# Patient Record
Sex: FEMALE | Race: ASIAN | NOT HISPANIC OR LATINO | Employment: OTHER | URBAN - METROPOLITAN AREA
[De-identification: names, ages, dates, MRNs, and addresses within clinical notes are randomized per-mention and may not be internally consistent; named-entity substitution may affect disease eponyms.]

---

## 2017-01-19 ENCOUNTER — OFFICE VISIT (OUTPATIENT)
Dept: FAMILY MEDICINE | Facility: CLINIC | Age: 62
End: 2017-01-19
Payer: COMMERCIAL

## 2017-01-19 VITALS
WEIGHT: 148 LBS | HEART RATE: 69 BPM | DIASTOLIC BLOOD PRESSURE: 94 MMHG | TEMPERATURE: 97.9 F | SYSTOLIC BLOOD PRESSURE: 152 MMHG | BODY MASS INDEX: 26.22 KG/M2 | HEIGHT: 63 IN

## 2017-01-19 DIAGNOSIS — M51.16 LUMBAR DISC DISEASE WITH RADICULOPATHY: ICD-10-CM

## 2017-01-19 DIAGNOSIS — M47.26 OSTEOARTHRITIS OF SPINE WITH RADICULOPATHY, LUMBAR REGION: ICD-10-CM

## 2017-01-19 DIAGNOSIS — M47.22 OSTEOARTHRITIS OF SPINE WITH RADICULOPATHY, CERVICAL REGION: Primary | ICD-10-CM

## 2017-01-19 PROCEDURE — 99213 OFFICE O/P EST LOW 20 MIN: CPT | Performed by: FAMILY MEDICINE

## 2017-01-19 RX ORDER — LOSARTAN POTASSIUM 100 MG/1
100 TABLET ORAL DAILY
COMMUNITY
Start: 2016-12-21 | End: 2017-05-02

## 2017-01-19 NOTE — Clinical Note
[address] [address]  Phone: [phone]    January 19, 2017        Idalia Howe  [address] [place] [address]          To whom it may concern:    RE: Idalia Friedman has been working with current restrictive status since last time. She has worked with repetitive movement including bending back and neck/squatting legs/stretching shoulders and arms/frequent rotation of back and neck related with current position at work. 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 So, I recommend her that she should maintain the level of activity in carrying and level lifting as noted below. Then, we will reassess the functional status in 6 months or earlier as needed.     - May work up to 8 hours per day.  - Sitting - standard chair but stretching and positional changes as needed   - Standing and walking - stretching or resting 10 minutes every 30 minutes   - Carrying and level lifting - less than 10 lbs, occasionally(less than 50% of the day's shift)   - Bending and lifting - may lift up to 15 lbs on occasional basis, up to 5 times per hour   - Pushing and pulling - less than 15 lbs at height between waist and chest and without bending forward         Sincerely,          Akhil Almodovar MD

## 2017-01-19 NOTE — PROGRESS NOTES
SUBJECTIVE:                                                    Idalia Howe is a 61 year old female who presents to clinic today for the following health issues:      Back Pain      Duration: ongoing        Specific cause:     Description:   Location of pain: upper back bilateral and neck both  Character of pain: sharp and dull ache  Pain radiation:radiates into the right leg and radiates into the right foot  New numbness or weakness in legs, not attributed to pain:  no     Intensity: moderate    History:   Pain interferes with job: No  History of back problems: recurrent self limited episodes of low back pain in the past  Any previous MRI or X-rays:   Sees a specialist for back pain:  No  Therapies tried without relief:     Alleviating factors:   Improved by:       Precipitating factors:  Worsened by: upper body movement and and putting neck down    Functional and Psychosocial Screen (Ac STarT Back):      Not performed today       Accompanying Signs & Symptoms:  Risk of Fracture:  None  Risk of Cauda Equina:  None  Risk of Infection:  None  Risk of Cancer:  None  Risk of Ankylosing Spondylitis:  Onset at age <35, male, AND morning back stiffness. no                        Problem list and histories reviewed & adjusted, as indicated.  Additional history: as documented    Patient Active Problem List   Diagnosis     Hyperlipidemia LDL goal <160     Lumbar spondylosis     Lumbar disc disease with radiculopathy     DJD (degenerative joint disease) of cervical spine     Malignant neoplasm of thyroid gland (H)     Nonspecific reaction to tuberculin test     Cyst of ovary     History of malignant neoplasm of thyroid     Postoperative hypothyroidism     Acute bronchitis     Chronic bronchitis (H)     No past surgical history on file.    Social History   Substance Use Topics     Smoking status: Never Smoker      Smokeless tobacco: Never Used     Alcohol Use: No     No family history on file.      Current Outpatient  "Prescriptions   Medication Sig Dispense Refill     losartan (COZAAR) 100 MG tablet Take 100 mg by mouth daily       simvastatin (ZOCOR) 20 MG tablet Take 20 mg by mouth       LEVOTHYROXINE SODIUM PO Take 112 mcg by mouth daily       No Known Allergies  Recent Labs   Lab Test 12/15/14 11/15/13 05/24/12   LDL  109  179*  130   HDL  57  56   --    TRIG  152  149   --    ALT  37  22  24   CR  0.64  0.70  0.60   GFRESTIMATED  >60  >60  >60   GFRESTBLACK  >60  >60  >60   POTASSIUM  3.9  3.9  4.2   TSH  <0.015  8.115   --       BP Readings from Last 3 Encounters:   01/19/17 152/94   06/09/16 142/87   05/25/16 108/66    Wt Readings from Last 3 Encounters:   01/19/17 148 lb (67.132 kg)   06/09/16 147 lb 6 oz (66.849 kg)   05/25/16 147 lb (66.679 kg)                    ROS:  Constitutional, HEENT, cardiovascular, pulmonary, gi and gu systems are negative, except as otherwise noted.    OBJECTIVE:                                                    /94 mmHg  Pulse 69  Temp(Src) 97.9  F (36.6  C) (Tympanic)  Ht 5' 3\" (1.6 m)  Wt 148 lb (67.132 kg)  BMI 26.22 kg/m2  Body mass index is 26.22 kg/(m^2).  GENERAL: healthy, alert and no distress  NECK: no adenopathy, no asymmetry, masses, or scars and thyroid normal to palpation  RESP: lungs clear to auscultation - no rales, rhonchi or wheezes  CV: regular rate and rhythm, normal S1 S2, no S3 or S4, no murmur, click or rub, no peripheral edema and peripheral pulses strong  ABDOMEN: soft, nontender, no hepatosplenomegaly, no masses and bowel sounds normal  MS: no gross musculoskeletal defects noted, no edema         ASSESSMENT/PLAN:                                                    Idalia was seen today for back pain.    Diagnoses and all orders for this visit:    Osteoarthritis of spine with radiculopathy, cervical region    Osteoarthritis of spine with radiculopathy, lumbar region    Lumbar disc disease with radiculopathy        She has been working at [employer] with " same restriction as noted below. It has been stable and willing to try extra 1 more hours per day. However, she is still has myalgia/upper back and low back spasm with radiculopathy with repetitive movement at work as was last time.  Will allow her to try work restriction as is noted below for now and  reassess if needed as needed base.      - May work up to 7 hours per day.  - Sitting - standard chair but stretching and positional changes as needed   - Standing and walking - stretching or resting 10 minutes every 30 minutes   - Carrying and level lifting - less than 10 lbs, occasionally(less than 50% of the day's shift)   - Bending and lifting - may lift up to 15 lbs on occasional basis, up to 5 times per hour   - Pushing and pulling - less than 15 lbs at height between waist and chest and without bending forward       FUTURE APPOINTMENTS:       - Follow-up visit in 6 months     Akhil Almodovar MD  [facility]

## 2017-01-19 NOTE — NURSING NOTE
"Chief Complaint   Patient presents with     Back Pain     work comp       Initial /94 mmHg  Pulse 69  Temp(Src) 97.9  F (36.6  C) (Tympanic)  Ht 5' 3\" (1.6 m)  Wt 148 lb (67.132 kg)  BMI 26.22 kg/m2 Estimated body mass index is 26.22 kg/(m^2) as calculated from the following:    Height as of this encounter: 5' 3\" (1.6 m).    Weight as of this encounter: 148 lb (67.132 kg).  BP completed using cuff size: alanis Adams CMA    "

## 2017-01-19 NOTE — MR AVS SNAPSHOT
"              After Visit Summary   1/19/2017    Idalia Howe    MRN: [id number]           Patient Information     Date Of Birth          1955        Visit Information        Provider Department      1/19/2017 10:40 AM Akhil Almodovar MD [facility]        Today's Diagnoses     Osteoarthritis of spine with radiculopathy, cervical region    -  1     Osteoarthritis of spine with radiculopathy, lumbar region         Lumbar disc disease with radiculopathy            Follow-ups after your visit        Your next 10 appointments already scheduled     Jan 23, 2017  9:40 AM   PHYSICAL with Akhil Almodovar MD   [facility] ([facility]    [address] [place] [address] [phone]              Who to contact     If you have questions or need follow up information about today's clinic visit or your schedule please contact [facility] directly at [phone].  Normal or non-critical lab and imaging results will be communicated to you by Weottahart, letter or phone within 4 business days after the clinic has received the results. If you do not hear from us within 7 days, please contact the clinic through Weottahart or phone. If you have a critical or abnormal lab result, we will notify you by phone as soon as possible.  Submit refill requests through Parabel or call your pharmacy and they will forward the refill request to us. Please allow 3 business days for your refill to be completed.          Additional Information About Your Visit        Weottahart Information     Parabel lets you send messages to your doctor, view your test results, renew your prescriptions, schedule appointments and more. To sign up, go to [email or web address] . Click on \"Log in\" on the left side of the screen, which will take you to the Welcome page. Then click on \"Sign up Now\" on the right side of the page.     You will be asked to enter the access code listed " "below, as well as some personal information. Please follow the directions to create your username and password.     Your access code is: [email or web address]  Expires: 2017 12:26 PM     Your access code will  in 90 days. If you need help or a new code, please call your [place] clinic or [phone].        Care EveryWhere ID     This is your Care EveryWhere ID. This could be used by other organizations to access your [place] medical records  [id number]        Your Vitals Were     Pulse Temperature Height BMI (Body Mass Index)          69 97.9  F (36.6  C) (Tympanic) 5' 3\" (1.6 m) 26.22 kg/m2         Blood Pressure from Last 3 Encounters:   17 152/94   16 142/87   16 108/66    Weight from Last 3 Encounters:   17 148 lb (67.132 kg)   16 147 lb 6 oz (66.849 kg)   16 147 lb (66.679 kg)              Today, you had the following     No orders found for display         Today's Medication Changes          These changes are accurate as of: 17 12:26 PM.  If you have any questions, ask your nurse or doctor.               Stop taking these medicines if you haven't already. Please contact your care team if you have questions.     fluticasone 50 MCG/ACT spray   Commonly known as:  FLONASE   Stopped by:  Akhil Almodovar MD                    Primary Care Provider Office Phone # Fax #    Akhil Almodovar [phone]       [address] [address]        Thank you!     Thank you for choosing [facility]  for your care. Our goal is always to provide you with excellent care. Hearing back from our patients is one way we can continue to improve our services. Please take a few minutes to complete the written survey that you may receive in the mail after your visit with us. Thank you!             Your Updated Medication List - Protect others around you: Learn how to safely use, store and throw away your medicines at " www.disposemymeds.org.          This list is accurate as of: 1/19/17 12:26 PM.  Always use your most recent med list.                   Brand Name Dispense Instructions for use    LEVOTHYROXINE SODIUM PO      Take 112 mcg by mouth daily       losartan 100 MG tablet    COZAAR     Take 100 mg by mouth daily       simvastatin 20 MG tablet    ZOCOR     Take 20 mg by mouth

## 2017-01-23 ENCOUNTER — OFFICE VISIT (OUTPATIENT)
Dept: FAMILY MEDICINE | Facility: CLINIC | Age: 62
End: 2017-01-23
Payer: COMMERCIAL

## 2017-01-23 VITALS
BODY MASS INDEX: 26.4 KG/M2 | DIASTOLIC BLOOD PRESSURE: 97 MMHG | WEIGHT: 149 LBS | TEMPERATURE: 97.1 F | SYSTOLIC BLOOD PRESSURE: 141 MMHG | HEIGHT: 63 IN | OXYGEN SATURATION: 97 % | HEART RATE: 64 BPM

## 2017-01-23 DIAGNOSIS — Z23 NEED FOR PROPHYLACTIC VACCINATION AND INOCULATION AGAINST INFLUENZA: ICD-10-CM

## 2017-01-23 DIAGNOSIS — N39.0 FREQUENT UTI: ICD-10-CM

## 2017-01-23 DIAGNOSIS — I10 BENIGN ESSENTIAL HYPERTENSION: ICD-10-CM

## 2017-01-23 DIAGNOSIS — Z00.00 ROUTINE GENERAL MEDICAL EXAMINATION AT A HEALTH CARE FACILITY: Primary | ICD-10-CM

## 2017-01-23 DIAGNOSIS — E89.0 POSTOPERATIVE HYPOTHYROIDISM: ICD-10-CM

## 2017-01-23 DIAGNOSIS — N39.498 OTHER URINARY INCONTINENCE: ICD-10-CM

## 2017-01-23 DIAGNOSIS — Z12.39 BREAST CANCER SCREENING: ICD-10-CM

## 2017-01-23 DIAGNOSIS — C73 MALIGNANT NEOPLASM OF THYROID GLAND (H): ICD-10-CM

## 2017-01-23 LAB
ALT SERPL W P-5'-P-CCNC: 22 U/L (ref 0–50)
ANION GAP SERPL CALCULATED.3IONS-SCNC: 7 MMOL/L (ref 3–14)
BUN SERPL-MCNC: 17 MG/DL (ref 7–30)
CALCIUM SERPL-MCNC: 8.9 MG/DL (ref 8.5–10.1)
CHLORIDE SERPL-SCNC: 108 MMOL/L (ref 94–109)
CHOLEST SERPL-MCNC: 190 MG/DL
CO2 SERPL-SCNC: 26 MMOL/L (ref 20–32)
CREAT SERPL-MCNC: 0.75 MG/DL (ref 0.52–1.04)
ERYTHROCYTE [DISTWIDTH] IN BLOOD BY AUTOMATED COUNT: 13 % (ref 10–15)
GFR SERPL CREATININE-BSD FRML MDRD: 78 ML/MIN/1.7M2
GLUCOSE SERPL-MCNC: 113 MG/DL (ref 70–99)
HCT VFR BLD AUTO: 43.9 % (ref 35–47)
HDLC SERPL-MCNC: 66 MG/DL
HGB BLD-MCNC: 14.7 G/DL (ref 11.7–15.7)
LDLC SERPL CALC-MCNC: 95 MG/DL
MCH RBC QN AUTO: 29.3 PG (ref 26.5–33)
MCHC RBC AUTO-ENTMCNC: 33.5 G/DL (ref 31.5–36.5)
MCV RBC AUTO: 88 FL (ref 78–100)
NONHDLC SERPL-MCNC: 124 MG/DL
PLATELET # BLD AUTO: 372 10E9/L (ref 150–450)
POTASSIUM SERPL-SCNC: 3.7 MMOL/L (ref 3.4–5.3)
RBC # BLD AUTO: 5.02 10E12/L (ref 3.8–5.2)
SODIUM SERPL-SCNC: 141 MMOL/L (ref 133–144)
T4 FREE SERPL-MCNC: 1.34 NG/DL (ref 0.76–1.46)
TRIGL SERPL-MCNC: 143 MG/DL
TSH SERPL DL<=0.005 MIU/L-ACNC: 0.02 MU/L (ref 0.4–4)
WBC # BLD AUTO: 7 10E9/L (ref 4–11)

## 2017-01-23 PROCEDURE — 36415 COLL VENOUS BLD VENIPUNCTURE: CPT | Performed by: FAMILY MEDICINE

## 2017-01-23 PROCEDURE — 84439 ASSAY OF FREE THYROXINE: CPT | Performed by: FAMILY MEDICINE

## 2017-01-23 PROCEDURE — 99396 PREV VISIT EST AGE 40-64: CPT | Mod: 25 | Performed by: FAMILY MEDICINE

## 2017-01-23 PROCEDURE — 84443 ASSAY THYROID STIM HORMONE: CPT | Performed by: FAMILY MEDICINE

## 2017-01-23 PROCEDURE — 80061 LIPID PANEL: CPT | Performed by: FAMILY MEDICINE

## 2017-01-23 PROCEDURE — 80048 BASIC METABOLIC PNL TOTAL CA: CPT | Performed by: FAMILY MEDICINE

## 2017-01-23 PROCEDURE — 90471 IMMUNIZATION ADMIN: CPT | Performed by: FAMILY MEDICINE

## 2017-01-23 PROCEDURE — 90686 IIV4 VACC NO PRSV 0.5 ML IM: CPT | Performed by: FAMILY MEDICINE

## 2017-01-23 PROCEDURE — 84460 ALANINE AMINO (ALT) (SGPT): CPT | Performed by: FAMILY MEDICINE

## 2017-01-23 PROCEDURE — 85027 COMPLETE CBC AUTOMATED: CPT | Performed by: FAMILY MEDICINE

## 2017-01-23 RX ORDER — CHLORTHALIDONE 25 MG/1
12.5 TABLET ORAL DAILY
Qty: 45 TABLET | Refills: 1 | Status: SHIPPED | OUTPATIENT
Start: 2017-01-23 | End: 2017-07-27

## 2017-01-23 NOTE — NURSING NOTE
"Chief Complaint   Patient presents with     Physical       Initial /97 mmHg  Pulse 64  Temp(Src) 97.1  F (36.2  C) (Tympanic)  Ht 5' 3\" (1.6 m)  Wt 149 lb (67.586 kg)  BMI 26.40 kg/m2  SpO2 97% Estimated body mass index is 26.4 kg/(m^2) as calculated from the following:    Height as of this encounter: 5' 3\" (1.6 m).    Weight as of this encounter: 149 lb (67.586 kg).  BP completed using cuff size: regular  "

## 2017-01-23 NOTE — Clinical Note
[place] [address] [place] [address]   [phone]      January 24, 2017    Idalia Howe  [address] [place] [address]                Dear Ms Howe,     Your lab results including complete blood cell counts/electrolyte balance and thyroid function/liver and kidney function were all normal.  However, your glucose(blood sugar) is at borderline, please keep working on life style modification including low fat/carb diet with regular exercise.  We might need to recheck your glucose in 6 months with A1C to make sure if there is any possible sign of diabetes.Please plan to visit us at the time.    Thank you,  Sincerely,   Akhil Almodovar M.D.

## 2017-01-23 NOTE — MR AVS SNAPSHOT
After Visit Summary   1/23/2017    Idalia Howe    MRN: [id number]           Patient Information     Date Of Birth          1955        Visit Information        Provider Department      1/23/2017 9:40 AM Akhil Almodovar MD [facility]        Today's Diagnoses     Routine general medical examination at a health care facility    -  1     Malignant neoplasm of thyroid gland (H)         Postoperative hypothyroidism         Breast cancer screening         Benign essential hypertension         Frequent UTI         Other urinary incontinence           Care Instructions      Preventive Health Recommendations  Female Ages 50 - 64    Yearly exam: See your health care provider every year in order to  o Review health changes.   o Discuss preventive care.    o Review your medicines if your doctor has prescribed any.      Get a Pap test every three years (unless you have an abnormal result and your provider advises testing more often).    If you get Pap tests with HPV test, you only need to test every 5 years, unless you have an abnormal result.     You do not need a Pap test if your uterus was removed (hysterectomy) and you have not had cancer.    You should be tested each year for STDs (sexually transmitted diseases) if you're at risk.     Have a mammogram every 1 to 2 years.    Have a colonoscopy at age 50, or have a yearly FIT test (stool test). These exams screen for colon cancer.      Have a cholesterol test every 5 years, or more often if advised.    Have a diabetes test (fasting glucose) every three years. If you are at risk for diabetes, you should have this test more often.     If you are at risk for osteoporosis (brittle bone disease), think about having a bone density scan (DEXA).    Shots: Get a flu shot each year. Get a tetanus shot every 10 years.    Nutrition:     Eat at least 5 servings of fruits and vegetables each day.    Eat whole-grain bread, whole-wheat pasta and brown rice  instead of white grains and rice.    Talk to your provider about Calcium and Vitamin D.     Lifestyle    Exercise at least 150 minutes a week (30 minutes a day, 5 days a week). This will help you control your weight and prevent disease.    Limit alcohol to one drink per day.    No smoking.     Wear sunscreen to prevent skin cancer.     See your dentist every six months for an exam and cleaning.    See your eye doctor every 1 to 2 years.            Follow-ups after your visit        Additional Services     UROLOGY ADULT REFERRAL       Your provider has referred you to: SARAH: [facility] [phone]   [email or web address]    Please be aware that coverage of these services is subject to the terms and limitations of your health insurance plan.  Call member services at your health plan with any benefit or coverage questions.      Please bring the following with you to your appointment:    (1) Any X-Rays, CTs or MRIs which have been performed.  Contact the facility where they were done to arrange for  prior to your scheduled appointment.    (2) List of current medications  (3) This referral request   (4) Any documents/labs given to you for this referral                  Future tests that were ordered for you today     Open Future Orders        Priority Expected Expires Ordered    *MA Screening Digital Bilateral Routine  1/23/2018 1/23/2017            Who to contact     If you have questions or need follow up information about today's clinic visit or your schedule please contact [facility] directly at [phone].  Normal or non-critical lab and imaging results will be communicated to you by MyChart, letter or phone within 4 business days after the clinic has received the results. If you do not hear from us within 7 days, please contact the clinic through MyChart or phone. If you have a critical or abnormal lab result, we will notify you by phone as soon as  "possible.  Submit refill requests through LibertadCard or call your pharmacy and they will forward the refill request to us. Please allow 3 business days for your refill to be completed.          Additional Information About Your Visit        LibertadCard Information     LibertadCard lets you send messages to your doctor, view your test results, renew your prescriptions, schedule appointments and more. To sign up, go to [email or web address] . Click on \"Log in\" on the left side of the screen, which will take you to the Welcome page. Then click on \"Sign up Now\" on the right side of the page.     You will be asked to enter the access code listed below, as well as some personal information. Please follow the directions to create your username and password.     Your access code is: [email or web address]  Expires: 2017 12:26 PM     Your access code will  in 90 days. If you need help or a new code, please call your [place] clinic or [phone].        Care EveryWhere ID     This is your Care EveryWhere ID. This could be used by other organizations to access your [place] medical records  [id number]        Your Vitals Were     Pulse Temperature Height BMI (Body Mass Index) Pulse Oximetry       64 97.1  F (36.2  C) (Tympanic) 5' 3\" (1.6 m) 26.40 kg/m2 97%        Blood Pressure from Last 3 Encounters:   17 141/97   17 152/94   16 142/87    Weight from Last 3 Encounters:   17 149 lb (67.586 kg)   17 148 lb (67.132 kg)   16 147 lb 6 oz (66.849 kg)              We Performed the Following     ALT     Basic metabolic panel  (Ca, Cl, CO2, Creat, Gluc, K, Na, BUN)     CBC with platelets     Lipid Profile with reflex to direct LDL     TSH with free T4 reflex     UROLOGY ADULT REFERRAL          Today's Medication Changes          These changes are accurate as of: 17 10:12 AM.  If you have any questions, ask your nurse or doctor.               Start taking these medicines.        Dose/Directions    " chlorthalidone 25 MG tablet   Commonly known as:  HYGROTON   Used for:  Benign essential hypertension   Started by:  Akhil Almodovar MD        Dose:  12.5 mg   Take 0.5 tablets (12.5 mg) by mouth daily   Quantity:  45 tablet   Refills:  1            Where to get your medicines      These medications were sent to [id number] - [place] - [address]  & [address]  [address], [facility] [address]     Phone:  [phone]    - chlorthalidone 25 MG tablet             Primary Care Provider Office Phone # Fax #    Akhil Almodovar [phone]       [address] [address]        Thank you!     Thank you for choosing [facility]  for your [place]. Our goal is always to provide you with excellent care. Hearing back from our patients is one way we can continue to improve our services. Please take a few minutes to complete the written survey that you may receive in the mail after your visit with us. Thank you!             Your Updated Medication List - Protect others around you: Learn how to safely use, store and throw away your medicines at www.disposemymeds.org.          This list is accurate as of: 1/23/17 10:12 AM.  Always use your most recent med list.                   Brand Name Dispense Instructions for use    chlorthalidone 25 MG tablet    HYGROTON    45 tablet    Take 0.5 tablets (12.5 mg) by mouth daily       LEVOTHYROXINE SODIUM PO      Take 112 mcg by mouth daily       losartan 100 MG tablet    COZAAR     Take 100 mg by mouth daily       simvastatin 20 MG tablet    ZOCOR     Take 20 mg by mouth

## 2017-01-23 NOTE — PATIENT INSTRUCTIONS

## 2017-01-23 NOTE — PROGRESS NOTES
SUBJECTIVE:     CC: Idalia Howe is an 61 year old woman who presents for preventive health visit.     Healthy Habits:    Do you get at least three servings of calcium containing foods daily (dairy, green leafy vegetables, etc.)? yes    Amount of exercise or daily activities, outside of work: 5 day(s) per week    Problems taking medications regularly No    Medication side effects: No    Have you had an eye exam in the past two years? No    Do you see a dentist twice per year? yes    Do you have sleep apnea, excessive snoring or daytime drowsiness?no but is experiencing some sleep problems.       Musculoskeletal problem/pain      Duration: Chronic     Description  Location: Right sided arm/legs     Intensity:  mild, moderate    Accompanying signs and symptoms: numbness and tingling    History  Previous similar problem: YES  Previous evaluation:  none    Precipitating or alleviating factors:  Trauma or overuse: no   Aggravating factors include: none    Therapies tried and outcome: nothing       Today's PHQ-2 Score:   PHQ-2 ( 1999 [employer]) 1/23/2017 5/25/2016   Q1: Little interest or pleasure in doing things 0 0   Q2: Feeling down, depressed or hopeless 0 0   PHQ-2 Score 0 0       Abuse: Current or Past(Physical, Sexual or Emotional)- No  Do you feel safe in your environment - Yes    Social History   Substance Use Topics     Smoking status: Never Smoker      Smokeless tobacco: Never Used     Alcohol Use: No     The patient does not drink >3 drinks per day nor >7 drinks per week.    Recent Labs   Lab Test 12/15/14 11/15/13   CHOL  196  265*   HDL  57  56   LDL  109  179*   TRIG  152  149   NHDL  139  209       Reviewed orders with patient.  Reviewed health maintenance and updated orders accordingly - Yes    Mammo Decision Support:  Patient over age 50, mutual decision to screen reflected in health maintenance.    Pertinent mammograms are reviewed under the imaging tab.  History of abnormal Pap smear: NO - age 30- 65  PAP every 3 years recommended  All Histories reviewed and updated in Epic.  Past Medical History   Diagnosis Date     Back pain      Neuropathic pain      Hip pain       No past surgical history on file.    ROS:  C: NEGATIVE for fever, chills, change in weight  I: NEGATIVE for worrisome rashes, moles or lesions  E: NEGATIVE for vision changes or irritation  ENT: NEGATIVE for ear, mouth and throat problems  R: NEGATIVE for significant cough or SOB  B: NEGATIVE for masses, tenderness or discharge  CV: NEGATIVE for chest pain, palpitations or peripheral edema  GI: NEGATIVE for nausea, abdominal pain, heartburn, or change in bowel habits  : NEGATIVE for unusual urinary or vaginal symptoms. No vaginal bleeding.  M: NEGATIVE for significant arthralgias or myalgia  N: NEGATIVE for weakness, dizziness or paresthesias  P: NEGATIVE for changes in mood or affect     BP Readings from Last 3 Encounters:   01/23/17 141/97   01/19/17 152/94   06/09/16 142/87    Wt Readings from Last 3 Encounters:   01/23/17 149 lb (67.586 kg)   01/19/17 148 lb (67.132 kg)   06/09/16 147 lb 6 oz (66.849 kg)                  Patient Active Problem List   Diagnosis     Hyperlipidemia LDL goal <160     Lumbar spondylosis     Lumbar disc disease with radiculopathy     DJD (degenerative joint disease) of cervical spine     Malignant neoplasm of thyroid gland (H)     Nonspecific reaction to tuberculin test     Cyst of ovary     History of malignant neoplasm of thyroid     Postoperative hypothyroidism     Acute bronchitis     Chronic bronchitis (H)     Chronic rhinitis     No past surgical history on file.    Social History   Substance Use Topics     Smoking status: Never Smoker      Smokeless tobacco: Never Used     Alcohol Use: No     No family history on file.      Current Outpatient Prescriptions   Medication Sig Dispense Refill     chlorthalidone (HYGROTON) 25 MG tablet Take 0.5 tablets (12.5 mg) by mouth daily 45 tablet 1     losartan (COZAAR) 100  "MG tablet Take 100 mg by mouth daily       simvastatin (ZOCOR) 20 MG tablet Take 20 mg by mouth       LEVOTHYROXINE SODIUM PO Take 112 mcg by mouth daily       No Known Allergies  Recent Labs   Lab Test 12/15/14 11/15/13 05/24/12   LDL  109  179*  130   HDL  57  56   --    TRIG  152  149   --    ALT  37  22  24   CR  0.64  0.70  0.60   GFRESTIMATED  >60  >60  >60   GFRESTBLACK  >60  >60  >60   POTASSIUM  3.9  3.9  4.2   TSH  <0.015  8.115   --       OBJECTIVE:     /97 mmHg  Pulse 64  Temp(Src) 97.1  F (36.2  C) (Tympanic)  Ht 5' 3\" (1.6 m)  Wt 149 lb (67.586 kg)  BMI 26.40 kg/m2  SpO2 97%  EXAM:  GENERAL: healthy, alert and no distress  EYES: Eyes grossly normal to inspection, PERRL and conjunctivae and sclerae normal  HENT: ear canals and TM's normal, nose and mouth without ulcers or lesions  NECK: no adenopathy, no asymmetry, masses, or scars and thyroid normal to palpation  RESP: lungs clear to auscultation - no rales, rhonchi or wheezes  CV: regular rate and rhythm, normal S1 S2, no S3 or S4, no murmur, click or rub, no peripheral edema and peripheral pulses strong  ABDOMEN: soft, nontender, no hepatosplenomegaly, no masses and bowel sounds normal  MS: no gross musculoskeletal defects noted, no edema  SKIN: no suspicious lesions or rashes  NEURO: Normal strength and tone, mentation intact and speech normal  BACK: no CVA tenderness, no paralumbar tenderness  PSYCH: mentation appears normal, affect normal/bright  LYMPH: no cervical, supraclavicular, axillary, or inguinal adenopathy    ASSESSMENT/PLAN:     1. Routine general medical examination at a health care facility    - CBC with platelets  - Basic metabolic panel  (Ca, Cl, CO2, Creat, Gluc, K, Na, BUN)  - ALT  - Lipid Profile with reflex to direct LDL  - TSH with free T4 reflex  - *MA Screening Digital Bilateral; Future    2. Malignant neoplasm of thyroid gland (H)    - TSH with free T4 reflex    3. Postoperative hypothyroidism    - TSH with free " "T4 reflex    4. Breast cancer screening    - *MA Screening Digital Bilateral; Future    5. Benign essential hypertension    - chlorthalidone (HYGROTON) 25 MG tablet; Take 0.5 tablets (12.5 mg) by mouth daily  Dispense: 45 tablet; Refill: 1    6. Frequent UTI    - UROLOGY ADULT REFERRAL    7. Other urinary incontinence    - UROLOGY ADULT REFERRAL    COUNSELING:   Reviewed preventive health counseling, as reflected in patient instructions         reports that she has never smoked. She has never used smokeless tobacco.    Estimated body mass index is 26.4 kg/(m^2) as calculated from the following:    Height as of this encounter: 5' 3\" (1.6 m).    Weight as of this encounter: 149 lb (67.586 kg).       Counseling Resources:  ATP IV Guidelines  Pooled Cohorts Equation Calculator  Breast Cancer Risk Calculator  FRAX Risk Assessment  ICSI Preventive Guidelines  Dietary Guidelines for Americans, 2010  [employer]'s MyPlate  ASA Prophylaxis  Lung CA Screening    Akhil Almodovar MD  [facility]  "

## 2017-01-23 NOTE — PROGRESS NOTES
Injectable Influenza Immunization Documentation    1.  Is the person to be vaccinated sick today?  No    2. Does the person to be vaccinated have an allergy to eggs or to a component of the vaccine?  No    3. Has the person to be vaccinated today ever had a serious reaction to influenza vaccine in the past?  No    4. Has the person to be vaccinated ever had Guillain-Appling syndrome?  No     Form completed by Luly Murphy MA

## 2017-01-24 ENCOUNTER — TELEPHONE (OUTPATIENT)
Dept: FAMILY MEDICINE | Facility: CLINIC | Age: 62
End: 2017-01-24

## 2017-01-24 NOTE — TELEPHONE ENCOUNTER
Letter in Team 3 TC shelf on the wall. Waiting for pt to call back with instructions on what to do with the letter.  Rosibel Benavides,

## 2017-01-31 ENCOUNTER — RADIANT APPOINTMENT (OUTPATIENT)
Dept: MAMMOGRAPHY | Facility: CLINIC | Age: 62
End: 2017-01-31
Attending: FAMILY MEDICINE
Payer: COMMERCIAL

## 2017-01-31 DIAGNOSIS — Z00.00 ROUTINE GENERAL MEDICAL EXAMINATION AT A HEALTH CARE FACILITY: ICD-10-CM

## 2017-01-31 DIAGNOSIS — Z12.39 BREAST CANCER SCREENING: ICD-10-CM

## 2017-01-31 PROCEDURE — G0202 SCR MAMMO BI INCL CAD: HCPCS | Mod: TC

## 2017-05-02 DIAGNOSIS — I10 BENIGN ESSENTIAL HYPERTENSION: Primary | ICD-10-CM

## 2017-05-02 NOTE — TELEPHONE ENCOUNTER
cozaar      Last Written Prescription Date: 12/21/2016  Last Fill Quantity: outside provider , # refills:   Last Office Visit with [facility] or [facility] prescribing provider: 1/23/2017  Future Office Visit:      Cholesterol   Date Value Ref Range Status   01/23/2017 190 <200 mg/dL Final     HDL Cholesterol   Date Value Ref Range Status   01/23/2017 66 >49 mg/dL Final     LDL Cholesterol Calculated   Date Value Ref Range Status   01/23/2017 95 <100 mg/dL Final     Comment:     Desirable:       <100 mg/dl     Triglycerides   Date Value Ref Range Status   01/23/2017 143 <150 mg/dL Final     ALT   Date Value Ref Range Status   01/23/2017 22 0 - 50 U/L Final

## 2017-05-03 RX ORDER — LOSARTAN POTASSIUM 100 MG/1
TABLET ORAL
Qty: 90 TABLET | Refills: 1 | Status: SHIPPED | OUTPATIENT
Start: 2017-05-03 | End: 2017-08-08

## 2017-05-03 NOTE — TELEPHONE ENCOUNTER
Last physical exam on 1/23/17  Routing refill request to provider for review/approval because:  Medication is reported/historical    Tammy Oneal RN

## 2017-05-11 ENCOUNTER — OFFICE VISIT (OUTPATIENT)
Dept: FAMILY MEDICINE | Facility: CLINIC | Age: 62
End: 2017-05-11
Payer: COMMERCIAL

## 2017-05-11 ENCOUNTER — RADIANT APPOINTMENT (OUTPATIENT)
Dept: GENERAL RADIOLOGY | Facility: CLINIC | Age: 62
End: 2017-05-11
Attending: FAMILY MEDICINE
Payer: COMMERCIAL

## 2017-05-11 VITALS
SYSTOLIC BLOOD PRESSURE: 132 MMHG | HEIGHT: 63 IN | WEIGHT: 152 LBS | BODY MASS INDEX: 26.93 KG/M2 | HEART RATE: 83 BPM | DIASTOLIC BLOOD PRESSURE: 80 MMHG | TEMPERATURE: 97.1 F | OXYGEN SATURATION: 96 %

## 2017-05-11 DIAGNOSIS — J20.9 ACUTE BRONCHITIS WITH SYMPTOMS > 10 DAYS: ICD-10-CM

## 2017-05-11 DIAGNOSIS — J20.9 ACUTE BRONCHITIS WITH SYMPTOMS > 10 DAYS: Primary | ICD-10-CM

## 2017-05-11 PROCEDURE — 99213 OFFICE O/P EST LOW 20 MIN: CPT | Performed by: FAMILY MEDICINE

## 2017-05-11 PROCEDURE — 71020 XR CHEST 2 VW: CPT

## 2017-05-11 RX ORDER — AZITHROMYCIN 250 MG/1
TABLET, FILM COATED ORAL
Qty: 6 TABLET | Refills: 0 | Status: SHIPPED | OUTPATIENT
Start: 2017-05-11 | End: 2017-08-01

## 2017-05-11 NOTE — NURSING NOTE
"Chief Complaint   Patient presents with     Cough       Initial /80 (BP Location: Left arm, Patient Position: Chair, Cuff Size: Adult Regular)  Pulse 83  Temp 97.1  F (36.2  C) (Tympanic)  Ht 5' 3\" (1.6 m)  Wt 152 lb (68.9 kg)  SpO2 96%  BMI 26.93 kg/m2 Estimated body mass index is 26.93 kg/(m^2) as calculated from the following:    Height as of this encounter: 5' 3\" (1.6 m).    Weight as of this encounter: 152 lb (68.9 kg).  Medication Reconciliation: complete     Tiff Adams CMA      "

## 2017-05-11 NOTE — MR AVS SNAPSHOT
"              After Visit Summary   5/11/2017    Idalia Howe    MRN: [id number]           Patient Information     Date Of Birth          1955        Visit Information        Provider Department      5/11/2017 3:20 PM Akhil Almodovar MD [facility]        Today's Diagnoses     Acute bronchitis with symptoms > 10 days    -  1       Follow-ups after your visit        Future tests that were ordered for you today     Open Future Orders        Priority Expected Expires Ordered    XR Chest 2 Views Routine 5/11/2017 5/11/2018 5/11/2017            Who to contact     If you have questions or need follow up information about today's clinic visit or your schedule please contact [facility] directly at [phone].  Normal or non-critical lab and imaging results will be communicated to you by FameBithart, letter or phone within 4 business days after the clinic has received the results. If you do not hear from us within 7 days, please contact the clinic through FameBithart or phone. If you have a critical or abnormal lab result, we will notify you by phone as soon as possible.  Submit refill requests through Spayee or call your pharmacy and they will forward the refill request to us. Please allow 3 business days for your refill to be completed.          Additional Information About Your Visit        MyChart Information     Spayee lets you send messages to your doctor, view your test results, renew your prescriptions, schedule appointments and more. To sign up, go to [email or web address] . Click on \"Log in\" on the left side of the screen, which will take you to the Welcome page. Then click on \"Sign up Now\" on the right side of the page.     You will be asked to enter the access code listed below, as well as some personal information. Please follow the directions to create your username and password.     Your access code is: [email or web address]  Expires: 8/9/2017  3:40 PM     Your access code will " " in 90 days. If you need help or a new code, please call your [place] clinic or [phone].        Care EveryWhere ID     This is your Care EveryWhere ID. This could be used by other organizations to access your [place] medical records  [id number]        Your Vitals Were     Pulse Temperature Height Pulse Oximetry BMI (Body Mass Index)       83 97.1  F (36.2  C) (Tympanic) 5' 3\" (1.6 m) 96% 26.93 kg/m2        Blood Pressure from Last 3 Encounters:   17 132/80   17 (!) 141/97   17 (!) 152/94    Weight from Last 3 Encounters:   17 152 lb (68.9 kg)   17 149 lb (67.6 kg)   17 148 lb (67.1 kg)                 Today's Medication Changes          These changes are accurate as of: 17  3:40 PM.  If you have any questions, ask your nurse or doctor.               Start taking these medicines.        Dose/Directions    azithromycin 250 MG tablet   Commonly known as:  ZITHROMAX   Used for:  Acute bronchitis with symptoms > 10 days   Started by:  Akhil Almodovar MD        Two tablets first day, then one tablet daily for four days.   Quantity:  6 tablet   Refills:  0            Where to get your medicines      [facility] [address] [place] [address] [place]  AT [facility]  & [place] [id number] [facility] [address]     Phone:  [phone]     azithromycin 250 MG tablet                Primary Care Provider Office Phone # Fax #    Akhil Almodovar [phone]       [address] [address]        Thank you!     Thank you for choosing [facility] [place] [place]  for your care. Our goal is always to provide you with excellent care. Hearing back from our patients is one way we can continue to improve our services. Please take a few minutes to complete the written survey that you may receive in the mail after your visit with us. Thank you!             Your Updated " Medication List - Protect others around you: Learn how to safely use, store and throw away your medicines at www.disposemymeds.org.          This list is accurate as of: 5/11/17  3:40 PM.  Always use your most recent med list.                   Brand Name Dispense Instructions for use    azithromycin 250 MG tablet    ZITHROMAX    6 tablet    Two tablets first day, then one tablet daily for four days.       chlorthalidone 25 MG tablet    HYGROTON    45 tablet    Take 0.5 tablets (12.5 mg) by mouth daily       LEVOTHYROXINE SODIUM PO      Take 112 mcg by mouth daily       losartan 100 MG tablet    COZAAR    90 tablet    TAKE 1 TABLET BY MOUTH EVERY DAY       simvastatin 20 MG tablet    ZOCOR     Take 20 mg by mouth

## 2017-05-11 NOTE — PROGRESS NOTES
SUBJECTIVE:                                                    Idalia Howe is a 61 year old female who presents to clinic today for the following health issues:      Acute Illness   Acute illness concerns: cough  Onset: x 2 weeks    Fever: no    Chills/Sweats: no    Headache (location?): YES    Sinus Pressure:no    Conjunctivitis:  no    Ear Pain: no    Rhinorrhea: no    Congestion: no    Sore Throat: no     Cough: YES-productive of yellow sputum, productive of green sputum    Wheeze: no    Decreased Appetite: no    Nausea: no    Vomiting: no    Diarrhea:  no    Dysuria/Freq.: no    Fatigue/Achiness: no    Sick/Strep Exposure: no     Therapies Tried and outcome: cough med and tylenol    Problem list and histories reviewed & adjusted, as indicated.  Additional history: as documented    Patient Active Problem List   Diagnosis     Hyperlipidemia LDL goal <160     Lumbar spondylosis     Lumbar disc disease with radiculopathy     DJD (degenerative joint disease) of cervical spine     Malignant neoplasm of thyroid gland (H)     Nonspecific reaction to tuberculin test     Cyst of ovary     History of malignant neoplasm of thyroid     Postoperative hypothyroidism     Acute bronchitis     Chronic bronchitis (H)     Chronic rhinitis     History reviewed. No pertinent surgical history.    Social History   Substance Use Topics     Smoking status: Never Smoker     Smokeless tobacco: Never Used     Alcohol use No     History reviewed. No pertinent family history.      Current Outpatient Prescriptions   Medication Sig Dispense Refill     azithromycin (ZITHROMAX) 250 MG tablet Two tablets first day, then one tablet daily for four days. 6 tablet 0     losartan (COZAAR) 100 MG tablet TAKE 1 TABLET BY MOUTH EVERY DAY 90 tablet 1     chlorthalidone (HYGROTON) 25 MG tablet Take 0.5 tablets (12.5 mg) by mouth daily 45 tablet 1     simvastatin (ZOCOR) 20 MG tablet Take 20 mg by mouth       LEVOTHYROXINE SODIUM PO Take 112 mcg by mouth  "daily       No Known Allergies  Recent Labs   Lab Test  01/23/17   1021 12/15/14 11/15/13   LDL  95  109  179*   HDL  66  57  56   TRIG  143  152  149   ALT  22  37  22   CR  0.75  0.64  0.70   GFRESTIMATED  78  >60  >60   GFRESTBLACK  >90   GFR Calc    >60  >60   POTASSIUM  3.7  3.9  3.9   TSH  0.02*  <0.015  8.115      BP Readings from Last 3 Encounters:   05/11/17 132/80   01/23/17 (!) 141/97   01/19/17 (!) 152/94    Wt Readings from Last 3 Encounters:   05/11/17 152 lb (68.9 kg)   01/23/17 149 lb (67.6 kg)   01/19/17 148 lb (67.1 kg)                    Reviewed and updated as needed this visit by clinical staff       Reviewed and updated as needed this visit by Provider         ROS:  Constitutional, HEENT, cardiovascular, pulmonary, gi and gu systems are negative, except as otherwise noted.    OBJECTIVE:                                                    /80 (BP Location: Left arm, Patient Position: Chair, Cuff Size: Adult Regular)  Pulse 83  Temp 97.1  F (36.2  C) (Tympanic)  Ht 5' 3\" (1.6 m)  Wt 152 lb (68.9 kg)  SpO2 96%  BMI 26.93 kg/m2  Body mass index is 26.93 kg/(m^2).  GENERAL: healthy, alert and no distress  NECK: no adenopathy, no asymmetry, masses, or scars and thyroid normal to palpation  RESP: lungs clear to auscultation - no rales, rhonchi or wheezes  CV: regular rate and rhythm, normal S1 S2, no S3 or S4, no murmur, click or rub, no peripheral edema and peripheral pulses strong  ABDOMEN: soft, nontender, no hepatosplenomegaly, no masses and bowel sounds normal  MS: no gross musculoskeletal defects noted, no edema         ASSESSMENT/PLAN:                                                    Idalia was seen today for cough.    Diagnoses and all orders for this visit:    Acute bronchitis with symptoms > 10 days  -     azithromycin (ZITHROMAX) 250 MG tablet; Two tablets first day, then one tablet daily for four days.  -     XR Chest 2 Views; Future      She has purulent " postnasal drainage, will have her to take saline spray and with antiallergic med, also will have her to take abx  CXR results will be reviewed and updated to pt       Akhil Almodovar MD  [facility] [place]

## 2017-05-15 ENCOUNTER — TELEPHONE (OUTPATIENT)
Dept: FAMILY MEDICINE | Facility: CLINIC | Age: 62
End: 2017-05-15

## 2017-05-15 DIAGNOSIS — R05.9 COUGH: Primary | ICD-10-CM

## 2017-05-15 NOTE — TELEPHONE ENCOUNTER
Patient was last seen on 5/11/17 for bronchitis and was prescribed z-abimael with note as below    Patient report that abx completed, has been taking xyzal daily plus nasal saline without any improvement  Denies fever, SOB, wheezing.    Would like something for cough.  Only sleep 2-3 hours per day due to cough.  Will RTC if you instruct to do so.   Please review and advise.  Pharmacy pended.  Thank you    Tammy Oneal RN

## 2017-05-16 RX ORDER — CODEINE PHOSPHATE AND GUAIFENESIN 10; 100 MG/5ML; MG/5ML
1 SOLUTION ORAL EVERY 4 HOURS PRN
Qty: 240 ML | Refills: 1 | Status: SHIPPED | OUTPATIENT
Start: 2017-05-16 | End: 2017-08-01

## 2017-06-10 ENCOUNTER — HEALTH MAINTENANCE LETTER (OUTPATIENT)
Age: 62
End: 2017-06-10

## 2017-07-27 DIAGNOSIS — I10 BENIGN ESSENTIAL HYPERTENSION: ICD-10-CM

## 2017-07-28 RX ORDER — CHLORTHALIDONE 25 MG/1
TABLET ORAL
Qty: 45 TABLET | Refills: 1 | Status: SHIPPED | OUTPATIENT
Start: 2017-07-28 | End: 2017-08-08

## 2017-07-28 NOTE — TELEPHONE ENCOUNTER
Osiel      Last Written Prescription Date: 1/23/2017  Last Fill Quantity: 45, # refills: 1    Last Office Visit with [facility], [facility] or [facility] prescribing provider:  1/23/2017   Future Office Visit:        BP Readings from Last 3 Encounters:   05/11/17 132/80   01/23/17 (!) 141/97   01/19/17 (!) 152/94     Prescription approved per [facility], [facility] or [facility] refill protocol.  Rimma Swann RN - Triage  [facility]

## 2017-08-01 ENCOUNTER — OFFICE VISIT (OUTPATIENT)
Dept: FAMILY MEDICINE | Facility: CLINIC | Age: 62
End: 2017-08-01
Payer: COMMERCIAL

## 2017-08-01 VITALS
HEART RATE: 73 BPM | TEMPERATURE: 97.6 F | RESPIRATION RATE: 12 BRPM | SYSTOLIC BLOOD PRESSURE: 135 MMHG | HEIGHT: 63 IN | BODY MASS INDEX: 26.58 KG/M2 | DIASTOLIC BLOOD PRESSURE: 82 MMHG | OXYGEN SATURATION: 100 % | WEIGHT: 150 LBS

## 2017-08-01 DIAGNOSIS — M47.816 LUMBAR SPONDYLOSIS: ICD-10-CM

## 2017-08-01 DIAGNOSIS — M51.16 LUMBAR DISC DISEASE WITH RADICULOPATHY: ICD-10-CM

## 2017-08-01 DIAGNOSIS — M47.22 OSTEOARTHRITIS OF SPINE WITH RADICULOPATHY, CERVICAL REGION: Primary | ICD-10-CM

## 2017-08-01 PROCEDURE — 99213 OFFICE O/P EST LOW 20 MIN: CPT | Performed by: FAMILY MEDICINE

## 2017-08-01 NOTE — NURSING NOTE
"Chief Complaint   Patient presents with     Back Pain       Initial /82 (Cuff Size: Adult Regular)  Pulse 73  Temp 97.6  F (36.4  C) (Tympanic)  Resp 12  Ht 5' 3\" (1.6 m)  Wt 150 lb (68 kg)  SpO2 100%  BMI 26.57 kg/m2 Estimated body mass index is 26.57 kg/(m^2) as calculated from the following:    Height as of this encounter: 5' 3\" (1.6 m).    Weight as of this encounter: 150 lb (68 kg).  Medication Reconciliation: complete   Shelly Young, CMA    "

## 2017-08-01 NOTE — LETTER
[address] [address]  Phone: [phone]    August 1, 2017        Idalia Howe  [address] [place] [address]        To whom it may concern:    RE: Idalia Friedman has been working with current restrictive status since last time. She has worked with repetitive movement including bending back and neck/squatting legs/stretching shoulders and arms/frequent rotation of back and neck related with current position at work. 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 So, I recommend her that she should maintain the level of activity in carrying and level lifting as noted below. Then, we will reassess the functional status in 6 months or earlier as needed.      - May work up to 8 hours per day.  - Sitting - standard chair but stretching and positional changes as needed   - Standing and walking - stretching or resting 10 minutes every 30 minutes   - Carrying and level lifting - less than 10 lbs, occasionally(less than 50% of the day's shift). For example, at '[employer]' at her work, she cannot perform more than five trays per one session per day, and not more than three sessions per day    - Bending and lifting - may lift up to 15 lbs on occasional basis, up to 5 times per hour   - Pushing and pulling - less than 15 lbs at height between waist and chest and without bending forward     Please contact me for questions or concerns.      Sincerely,      Akhil Almodovar MD

## 2017-08-01 NOTE — PROGRESS NOTES
SUBJECTIVE:                                                    Idalia Howe is a 61 year old female who presents to clinic today for the following health issues:      Back Pain Recheck       Description (location/character/radiation): Pt states she us experiencing some supper back pain and left arm pain.          Problem list and histories reviewed & adjusted, as indicated.  Additional history: as documented    Patient Active Problem List   Diagnosis     Hyperlipidemia LDL goal <160     Lumbar spondylosis     Lumbar disc disease with radiculopathy     DJD (degenerative joint disease) of cervical spine     Malignant neoplasm of thyroid gland (H)     Nonspecific reaction to tuberculin test     Cyst of ovary     History of malignant neoplasm of thyroid     Postoperative hypothyroidism     Acute bronchitis     Chronic bronchitis (H)     Chronic rhinitis     No past surgical history on file.    Social History   Substance Use Topics     Smoking status: Never Smoker     Smokeless tobacco: Never Used     Alcohol use No     No family history on file.      Current Outpatient Prescriptions   Medication Sig Dispense Refill     chlorthalidone (HYGROTON) 25 MG tablet TAKE 1/2 TABLET(12.5 MG) BY MOUTH DAILY 45 tablet 1     losartan (COZAAR) 100 MG tablet TAKE 1 TABLET BY MOUTH EVERY DAY 90 tablet 1     simvastatin (ZOCOR) 20 MG tablet Take 20 mg by mouth       LEVOTHYROXINE SODIUM PO Take 112 mcg by mouth daily       No Known Allergies  Recent Labs   Lab Test  01/23/17   1021 12/15/14 11/15/13   LDL  95  109  179*   HDL  66  57  56   TRIG  143  152  149   ALT  22  37  22   CR  0.75  0.64  0.70   GFRESTIMATED  78  >60  >60   GFRESTBLACK  >90   GFR Calc    >60  >60   POTASSIUM  3.7  3.9  3.9   TSH  0.02*  <0.015  8.115      BP Readings from Last 3 Encounters:   08/01/17 135/82   05/11/17 132/80   01/23/17 (!) 141/97    Wt Readings from Last 3 Encounters:   08/01/17 150 lb (68 kg)   05/11/17 152 lb (68.9 kg)  "  01/23/17 149 lb (67.6 kg)                          Reviewed and updated as needed this visit by clinical staff     Reviewed and updated as needed this visit by Provider         ROS:  Constitutional, HEENT, cardiovascular, pulmonary, gi and gu systems are negative, except as otherwise noted.      OBJECTIVE:   /82 (Cuff Size: Adult Regular)  Pulse 73  Temp 97.6  F (36.4  C) (Tympanic)  Resp 12  Ht 5' 3\" (1.6 m)  Wt 150 lb (68 kg)  SpO2 100%  BMI 26.57 kg/m2  Body mass index is 26.57 kg/(m^2).  GENERAL: healthy, alert and no distress  NECK: no adenopathy, no asymmetry, masses, or scars and thyroid normal to palpation  RESP: lungs clear to auscultation - no rales, rhonchi or wheezes  CV: regular rate and rhythm, normal S1 S2, no S3 or S4, no murmur, click or rub, no peripheral edema and peripheral pulses strong  ABDOMEN: soft, nontender, no hepatosplenomegaly, no masses and bowel sounds normal  MS: no gross musculoskeletal defects noted, no edema        ASSESSMENT/PLAN:   Idalia was seen today for back pain.    Diagnoses and all orders for this visit:    Osteoarthritis of spine with radiculopathy, cervical region    Lumbar disc disease with radiculopathy    Lumbar spondylosis    Other orders  -     Cancel: Hepatitis C Screen Reflex to HCV RNA Quant and Genotype      Has been worsening with increase physical activity at work, will have her to keep her on current level of activity and limitation with additional statements(at her 'fall-down' in her work, only allowed 5 trays per one session a day, and no more than 3 sessions per day)    FUTURE APPOINTMENTS:       - Follow-up visit in 6 months     Akhil Almodovar [place]  [facility] [place]  "

## 2017-08-01 NOTE — MR AVS SNAPSHOT
"              After Visit Summary   2017    Idalia Howe    MRN: [id number]           Patient Information     Date Of Birth          1955        Visit Information        Provider Department      2017 11:40 AM Akhil Almodovar MD [facility] [place]        Today's Diagnoses     Osteoarthritis of spine with radiculopathy, cervical region    -  1    Lumbar disc disease with radiculopathy        Lumbar spondylosis           Follow-ups after your visit        Who to contact     If you have questions or need follow up information about today's clinic visit or your schedule please contact [facility] directly at [phone].  Normal or non-critical lab and imaging results will be communicated to you by MyChart, letter or phone within 4 business days after the clinic has received the results. If you do not hear from us within 7 days, please contact the clinic through MyChart or phone. If you have a critical or abnormal lab result, we will notify you by phone as soon as possible.  Submit refill requests through [employer] or call your pharmacy and they will forward the refill request to us. Please allow 3 business days for your refill to be completed.          Additional Information About Your Visit        MyChart Information     [employer] lets you send messages to your doctor, view your test results, renew your prescriptions, schedule appointments and more. To sign up, go to [email or web address] . Click on \"Log in\" on the left side of the screen, which will take you to the Welcome page. Then click on \"Sign up Now\" on the right side of the page.     You will be asked to enter the access code listed below, as well as some personal information. Please follow the directions to create your username and password.     Your access code is: [email or web address]  Expires: 2017  3:40 PM     Your access code will  in 90 days. If you need help or a new code, please call your [facility] or " "[phone].        Care EveryWhere ID     This is your Care EveryWhere ID. This could be used by other organizations to access your [facility] medical records  [id number]        Your Vitals Were     Pulse Temperature Respirations Height Pulse Oximetry BMI (Body Mass Index)    73 97.6  F (36.4  C) (Tympanic) 12 5' 3\" (1.6 m) 100% 26.57 kg/m2       Blood Pressure from Last 3 Encounters:   08/01/17 135/82   05/11/17 132/80   01/23/17 (!) 141/97    Weight from Last 3 Encounters:   08/01/17 150 lb (68 kg)   05/11/17 152 lb (68.9 kg)   01/23/17 149 lb (67.6 kg)              Today, you had the following     No orders found for display       Primary Care Provider Office Phone # Fax #    Akhil Almodovar [phone]       [address] [address]        Equal Access to Services     [facility] : Hadii aad ku hadasho Soomaali, waaxda luqadaha, qaybta kaalmada adeegyada, waxay mary maciel . So [facility] [phone].    ATENCIÓN: Si habla español, tiene a lopez disposición servicios gratuitos de asistencia lingüística. Llame al [phone].    We comply with applicable federal civil rights laws and [place] laws. We do not discriminate on the basis of race, color, national origin, age, disability sex, sexual orientation or gender identity.            Thank you!     Thank you [place] [facility]  [place]. Our goal is always to provide you with excellent care. Hearing back from our patients is one way we can continue to improve our services. Please take a few minutes to complete the written survey that you may receive in the mail after your visit with us. Thank you!             Your Updated Medication List - Protect others around you: Learn how to safely use, store and throw away your medicines at www.disposemymeds.org.          This list is accurate as of: 8/1/17 12:17 PM.  Always use your most recent med list.             "       Brand Name Dispense Instructions for use Diagnosis    chlorthalidone 25 MG tablet    HYGROTON    45 tablet    TAKE 1/2 TABLET(12.5 MG) BY MOUTH DAILY    Benign essential hypertension       LEVOTHYROXINE SODIUM PO      Take 112 mcg by mouth daily        losartan 100 MG tablet    COZAAR    90 tablet    TAKE 1 TABLET BY MOUTH EVERY DAY    Benign essential hypertension       simvastatin 20 MG tablet    ZOCOR     Take 20 mg by mouth

## 2017-08-08 DIAGNOSIS — E78.5 HYPERLIPIDEMIA LDL GOAL <160: Primary | ICD-10-CM

## 2017-08-08 DIAGNOSIS — E03.9 HYPOTHYROIDISM, UNSPECIFIED TYPE: ICD-10-CM

## 2017-08-08 DIAGNOSIS — I10 BENIGN ESSENTIAL HYPERTENSION: ICD-10-CM

## 2017-08-08 RX ORDER — SIMVASTATIN 20 MG
20 TABLET ORAL AT BEDTIME
Qty: 90 TABLET | Refills: 1 | Status: SHIPPED | OUTPATIENT
Start: 2017-08-08 | End: 2018-01-11

## 2017-08-08 RX ORDER — CHLORTHALIDONE 25 MG/1
TABLET ORAL
Qty: 45 TABLET | Refills: 1 | Status: SHIPPED | OUTPATIENT
Start: 2017-08-08 | End: 2018-01-11

## 2017-08-08 RX ORDER — LEVOTHYROXINE SODIUM 112 UG/1
112 CAPSULE ORAL DAILY
Qty: 90 CAPSULE | Refills: 1 | Status: SHIPPED | OUTPATIENT
Start: 2017-08-08 | End: 2018-01-11

## 2017-08-08 RX ORDER — LOSARTAN POTASSIUM 100 MG/1
100 TABLET ORAL DAILY
Qty: 90 TABLET | Refills: 1 | Status: SHIPPED | OUTPATIENT
Start: 2017-08-08 | End: 2018-01-11

## 2017-12-31 DIAGNOSIS — I10 BENIGN ESSENTIAL HYPERTENSION: ICD-10-CM

## 2018-01-02 NOTE — TELEPHONE ENCOUNTER
Requested Prescriptions   Pending Prescriptions Disp Refills     losartan (COZAAR) 100 MG tablet [Pharmacy Med Name: LOSARTAN 100MG TABLETS] 90 tablet 0    Last Written Prescription Date:  8/8/17  Last Fill Quantity: 90,  # refills: 1   Last Office Visit with [facility], [facility] or [facility] prescribing provider:  8/1/17   Future Office Visit:      Sig: TAKE 1 TABLET BY MOUTH EVERY DAY    Angiotensin-II Receptors Passed    12/31/2017  3:11 AM       Passed - Blood pressure under 140/90 in past 12 months.    BP Readings from Last 3 Encounters:   08/01/17 135/82   05/11/17 132/80   01/23/17 (!) 141/97                Passed - Recent or future visit with authorizing provider's specialty    Patient had office visit in the last year or has a visit in the next 30 days with authorizing provider.  See chart review.              Passed - Patient is age 18 or older       Passed - No active pregnancy on record       Passed - Normal serum creatinine on file in past 12 months    Recent Labs   Lab Test  01/23/17   1021   CR  0.75            Passed - Normal serum potassium on file in past 12 months    Recent Labs   Lab Test  01/23/17   1021   POTASSIUM  3.7                   Passed - No positive pregnancy test in past 12 months

## 2018-01-03 RX ORDER — LOSARTAN POTASSIUM 100 MG/1
TABLET ORAL
Qty: 90 TABLET | Refills: 0 | OUTPATIENT
Start: 2018-01-03

## 2018-01-11 ENCOUNTER — OFFICE VISIT (OUTPATIENT)
Dept: FAMILY MEDICINE | Facility: CLINIC | Age: 63
End: 2018-01-11
Payer: COMMERCIAL

## 2018-01-11 VITALS
WEIGHT: 147 LBS | SYSTOLIC BLOOD PRESSURE: 120 MMHG | BODY MASS INDEX: 26.05 KG/M2 | TEMPERATURE: 97.9 F | HEART RATE: 66 BPM | HEIGHT: 63 IN | OXYGEN SATURATION: 98 % | DIASTOLIC BLOOD PRESSURE: 80 MMHG | RESPIRATION RATE: 14 BRPM

## 2018-01-11 DIAGNOSIS — I10 BENIGN ESSENTIAL HYPERTENSION: ICD-10-CM

## 2018-01-11 DIAGNOSIS — M19.90 ARTHRITIS: ICD-10-CM

## 2018-01-11 DIAGNOSIS — E03.9 HYPOTHYROIDISM, UNSPECIFIED TYPE: ICD-10-CM

## 2018-01-11 DIAGNOSIS — J20.9 ACUTE BRONCHITIS WITH SYMPTOMS > 10 DAYS: ICD-10-CM

## 2018-01-11 DIAGNOSIS — E78.5 HYPERLIPIDEMIA LDL GOAL <160: ICD-10-CM

## 2018-01-11 DIAGNOSIS — R30.0 DYSURIA: Primary | ICD-10-CM

## 2018-01-11 DIAGNOSIS — R45.89 DYSPHORIC MOOD: ICD-10-CM

## 2018-01-11 DIAGNOSIS — R60.9 EDEMA, UNSPECIFIED TYPE: ICD-10-CM

## 2018-01-11 DIAGNOSIS — Z23 NEED FOR PROPHYLACTIC VACCINATION AND INOCULATION AGAINST INFLUENZA: ICD-10-CM

## 2018-01-11 DIAGNOSIS — R05.9 COUGH: ICD-10-CM

## 2018-01-11 DIAGNOSIS — Z11.59 NEED FOR HEPATITIS C SCREENING TEST: ICD-10-CM

## 2018-01-11 DIAGNOSIS — R53.83 TIRED: ICD-10-CM

## 2018-01-11 DIAGNOSIS — N39.0 URINARY TRACT INFECTION WITHOUT HEMATURIA, SITE UNSPECIFIED: ICD-10-CM

## 2018-01-11 LAB
ALBUMIN UR-MCNC: NEGATIVE MG/DL
APPEARANCE UR: ABNORMAL
BACTERIA #/AREA URNS HPF: ABNORMAL /HPF
BASOPHILS # BLD AUTO: 0 10E9/L (ref 0–0.2)
BASOPHILS NFR BLD AUTO: 0.2 %
BILIRUB UR QL STRIP: NEGATIVE
COLOR UR AUTO: YELLOW
CRP SERPL-MCNC: 20 MG/L (ref 0–8)
DIFFERENTIAL METHOD BLD: ABNORMAL
EOSINOPHIL # BLD AUTO: 0.4 10E9/L (ref 0–0.7)
EOSINOPHIL NFR BLD AUTO: 3.9 %
ERYTHROCYTE [DISTWIDTH] IN BLOOD BY AUTOMATED COUNT: 13 % (ref 10–15)
ERYTHROCYTE [SEDIMENTATION RATE] IN BLOOD BY WESTERGREN METHOD: 19 MM/H (ref 0–30)
GLUCOSE UR STRIP-MCNC: NEGATIVE MG/DL
HCT VFR BLD AUTO: 39.1 % (ref 35–47)
HGB BLD-MCNC: 13.6 G/DL (ref 11.7–15.7)
HGB UR QL STRIP: ABNORMAL
KETONES UR STRIP-MCNC: NEGATIVE MG/DL
LEUKOCYTE ESTERASE UR QL STRIP: ABNORMAL
LYMPHOCYTES # BLD AUTO: 2.3 10E9/L (ref 0.8–5.3)
LYMPHOCYTES NFR BLD AUTO: 20.3 %
MCH RBC QN AUTO: 30.4 PG (ref 26.5–33)
MCHC RBC AUTO-ENTMCNC: 34.8 G/DL (ref 31.5–36.5)
MCV RBC AUTO: 87 FL (ref 78–100)
MONOCYTES # BLD AUTO: 0.7 10E9/L (ref 0–1.3)
MONOCYTES NFR BLD AUTO: 5.8 %
NEUTROPHILS # BLD AUTO: 7.9 10E9/L (ref 1.6–8.3)
NEUTROPHILS NFR BLD AUTO: 69.8 %
NITRATE UR QL: POSITIVE
PH UR STRIP: 7 PH (ref 5–7)
PLATELET # BLD AUTO: 353 10E9/L (ref 150–450)
RBC # BLD AUTO: 4.48 10E12/L (ref 3.8–5.2)
RBC #/AREA URNS AUTO: ABNORMAL /HPF
SOURCE: ABNORMAL
SP GR UR STRIP: 1.01 (ref 1–1.03)
UROBILINOGEN UR STRIP-ACNC: 0.2 EU/DL (ref 0.2–1)
WBC # BLD AUTO: 11.4 10E9/L (ref 4–11)
WBC #/AREA URNS AUTO: ABNORMAL /HPF

## 2018-01-11 PROCEDURE — 87186 SC STD MICRODIL/AGAR DIL: CPT | Performed by: FAMILY MEDICINE

## 2018-01-11 PROCEDURE — 86431 RHEUMATOID FACTOR QUANT: CPT | Performed by: FAMILY MEDICINE

## 2018-01-11 PROCEDURE — 87088 URINE BACTERIA CULTURE: CPT | Performed by: FAMILY MEDICINE

## 2018-01-11 PROCEDURE — 80050 GENERAL HEALTH PANEL: CPT | Performed by: FAMILY MEDICINE

## 2018-01-11 PROCEDURE — 87086 URINE CULTURE/COLONY COUNT: CPT | Performed by: FAMILY MEDICINE

## 2018-01-11 PROCEDURE — 85652 RBC SED RATE AUTOMATED: CPT | Performed by: FAMILY MEDICINE

## 2018-01-11 PROCEDURE — 86140 C-REACTIVE PROTEIN: CPT | Performed by: FAMILY MEDICINE

## 2018-01-11 PROCEDURE — 84439 ASSAY OF FREE THYROXINE: CPT | Performed by: FAMILY MEDICINE

## 2018-01-11 PROCEDURE — 99214 OFFICE O/P EST MOD 30 MIN: CPT | Performed by: FAMILY MEDICINE

## 2018-01-11 PROCEDURE — 81001 URINALYSIS AUTO W/SCOPE: CPT | Performed by: FAMILY MEDICINE

## 2018-01-11 PROCEDURE — 36415 COLL VENOUS BLD VENIPUNCTURE: CPT | Performed by: FAMILY MEDICINE

## 2018-01-11 RX ORDER — SIMVASTATIN 20 MG
20 TABLET ORAL AT BEDTIME
Qty: 90 TABLET | Refills: 1 | Status: SHIPPED | OUTPATIENT
Start: 2018-01-11 | End: 2018-07-09

## 2018-01-11 RX ORDER — CHLORTHALIDONE 25 MG/1
TABLET ORAL
Qty: 45 TABLET | Refills: 1 | Status: SHIPPED | OUTPATIENT
Start: 2018-01-11 | End: 2018-07-01

## 2018-01-11 RX ORDER — LEVOTHYROXINE SODIUM 112 UG/1
112 CAPSULE ORAL DAILY
Qty: 90 CAPSULE | Refills: 1 | Status: SHIPPED | OUTPATIENT
Start: 2018-01-11 | End: 2018-10-09

## 2018-01-11 RX ORDER — LOSARTAN POTASSIUM 100 MG/1
100 TABLET ORAL DAILY
Qty: 90 TABLET | Refills: 1 | Status: SHIPPED | OUTPATIENT
Start: 2018-01-11 | End: 2018-07-17

## 2018-01-11 RX ORDER — CEFDINIR 300 MG/1
600 CAPSULE ORAL DAILY
Qty: 20 CAPSULE | Refills: 0 | Status: SHIPPED | OUTPATIENT
Start: 2018-01-11 | End: 2018-02-02

## 2018-01-11 RX ORDER — CODEINE PHOSPHATE AND GUAIFENESIN 10; 100 MG/5ML; MG/5ML
1 SOLUTION ORAL EVERY 4 HOURS PRN
Qty: 240 ML | Refills: 0 | Status: SHIPPED | OUTPATIENT
Start: 2018-01-11 | End: 2018-07-17

## 2018-01-11 NOTE — MR AVS SNAPSHOT
After Visit Summary   1/11/2018    Idalia Howe    MRN: [id number]           Patient Information     Date Of Birth          1955        Visit Information        Provider Department      1/11/2018 1:20 PM Akhil Almodovar MD [facility] [place]        Today's Diagnoses     Dysuria    -  1    Need for hepatitis C screening test        Need for prophylactic vaccination and inoculation against influenza        Edema, unspecified type        Tired        Arthritis        Dysphoric mood        Acute bronchitis with symptoms > 10 days        Cough        Benign essential hypertension        Hyperlipidemia LDL goal <160        Hypothyroidism, unspecified type        Urinary tract infection without hematuria, site unspecified           Follow-ups after your visit        Additional Services     MENTAL HEALTH REFERRAL  - Adult; Assessments and Testing; General Psychological Testing; G: [facility] [phone]; We will contact you to schedule the appointment or please call with any questions       All scheduling is subject to the client's specific insurance plan & benefits, provider/location availability, and provider clinical specialities.  Please arrive 15 minutes early for your first appointment and bring your completed paperwork.    Please be aware that coverage of these services is subject to the terms and limitations of your health insurance plan.  Call member services at your health plan with any benefit or coverage questions.                            Follow-up notes from your care team     Return in about 3 months (around 4/11/2018) for Physical Exam.      Your next 10 appointments already scheduled     Jan 12, 2018 12:20 PM CST   Office Visit with Akhil Almodovar MD   [facility] ([facility])    [address] [id number] [phone]           Bring a current list of meds and any records pertaining to this  "visit. For Physicals, please bring immunization records and any forms needing to be filled out. Please arrive 10 minutes early to complete paperwork.              Who to contact     If you have questions or need follow up information about today's clinic visit or your schedule please contact [facility] directly at [phone].  Normal or non-critical lab and imaging results will be communicated to you by MyChart, letter or phone within 4 business days after the clinic has received the results. If you do not hear from us within 7 days, please contact the clinic through MyChart or phone. If you have a critical or abnormal lab result, we will notify you by phone as soon as possible.  Submit refill requests through [employer] or call your pharmacy and they will forward the refill request to us. Please allow 3 business days for your refill to be completed.          Additional Information About Your Visit        MyChart Information     [employer] lets you send messages to your doctor, view your test results, renew your prescriptions, schedule appointments and more. To sign up, go to [email or web address] . Click on \"Log in\" on the left side of the screen, which will take you to the Welcome page. Then click on \"Sign up Now\" on the right side of the page.     You will be asked to enter the access code listed below, as well as some personal information. Please follow the directions to create your username and password.     Your access code is: [email or web address]  Expires: 2018  2:04 PM     Your access code will  in 90 days. If you need help or a new code, please call your [place] clinic or [phone].        Care EveryWhere ID     This is your Care EveryWhere ID. This could be used by other organizations to access your [place] medical records  [id number]        Your Vitals Were     Pulse Temperature Respirations Height Pulse Oximetry BMI (Body Mass Index)    66 97.9  F (36.6  C) (Tympanic) 14 5' 3\" " (1.6 m) 98% 26.04 kg/m2       Blood Pressure from Last 3 Encounters:   01/11/18 120/80   08/01/17 135/82   05/11/17 132/80    Weight from Last 3 Encounters:   01/11/18 147 lb (66.7 kg)   08/01/17 150 lb (68 kg)   05/11/17 152 lb (68.9 kg)              We Performed the Following     CBC with platelets and differential     Comprehensive metabolic panel     CRP, inflammation     ESR: Erythrocyte sedimentation rate     MENTAL HEALTH REFERRAL  - Adult; Assessments and Testing; General Psychological Testing; [facility]: [facility] [phone]; We will contact you to schedule the appointment or please call with any questions     Rheumatoid factor     TSH with free T4 reflex     UA reflex to Microscopic and Culture     Urine Culture Aerobic Bacterial     Urine Microscopic          Today's Medication Changes          These changes are accurate as of: 1/11/18  2:04 PM.  If you have any questions, ask your nurse or doctor.               Start taking these medicines.        Dose/Directions    cefdinir 300 MG capsule   Commonly known as:  OMNICEF   Used for:  Acute bronchitis with symptoms > 10 days, Urinary tract infection without hematuria, site unspecified   Started by:  Akhil Almodovar MD        Dose:  600 mg   Take 2 capsules (600 mg) by mouth daily   Quantity:  20 capsule   Refills:  0       guaiFENesin-codeine 100-10 MG/5ML Soln solution   Commonly known as:  ROBITUSSIN AC   Used for:  Acute bronchitis with symptoms > 10 days, Cough   Started by:  Akhil Almodovar MD        Dose:  1 tsp.   Take 5 mLs by mouth every 4 hours as needed for cough   Quantity:  240 mL   Refills:  0            Where to get your medicines      These medications were sent to [facility] [id number]  [place], [place] - [address] [place] [address] [facility]  & [place] [facility]  [address], [place] [address]     Phone:  [phone]     cefdinir 300 MG capsule    chlorthalidone 25 MG tablet    Levothyroxine Sodium 112 MCG Caps     losartan 100 MG tablet    simvastatin 20 MG tablet         Some of these will need a paper prescription and others can be bought over the counter.  Ask your nurse if you have questions.     Bring a paper prescription for each of these medications     guaiFENesin-codeine 100-10 MG/5ML Soln solution                Primary Care Provider Office Phone # Fax #    Akhil BRANDY Almodovar -[phone]       [address] [address]        Equal Access to Services     MARY SPENCER : Hadii aad ku hadasho Soomaali, waaxda luqadaha, qaybta kaalmada adeegyada, waxay idiin hayaan adeeg kharash la'aan . So [facility] [phone].    ATENCIÓN: Si habla español, tiene a lopez disposición servicios gratuitos de asistencia lingüística. [facility] [phone].    We comply with applicable federal civil rights laws and [place] laws. We do not discriminate on the basis of race, color, national origin, age, disability, sex, sexual orientation, or gender identity.            Thank you!     Thank you for choosing [facility]  for your care. Our goal is always to provide you with excellent care. Hearing back from our patients is one way we can continue to improve our services. Please take a few minutes to complete the written survey that you may receive in the mail after your visit with us. Thank you!             Your Updated Medication List - Protect others around you: Learn how to safely use, store and throw away your medicines at www.disposemymeds.org.          This list is accurate as of: 1/11/18  2:04 PM.  Always use your most recent med list.                   Brand Name Dispense Instructions for use Diagnosis    cefdinir 300 MG capsule    OMNICEF    20 capsule    Take 2 capsules (600 mg) by mouth daily    Acute bronchitis with symptoms > 10 days, Urinary tract infection without hematuria, site unspecified       chlorthalidone 25 MG tablet    HYGROTON    45 tablet    TAKE 1/2 TABLET(12.5 MG) BY  MOUTH DAILY    Benign essential hypertension, Hyperlipidemia LDL goal <160, Hypothyroidism, unspecified type       guaiFENesin-codeine 100-10 MG/5ML Soln solution    ROBITUSSIN AC    240 mL    Take 5 mLs by mouth every 4 hours as needed for cough    Acute bronchitis with symptoms > 10 days, Cough       Levothyroxine Sodium 112 MCG Caps     90 capsule    Take 112 mcg by mouth daily    Benign essential hypertension, Hyperlipidemia LDL goal <160, Hypothyroidism, unspecified type       losartan 100 MG tablet    COZAAR    90 tablet    Take 1 tablet (100 mg) by mouth daily    Benign essential hypertension, Hyperlipidemia LDL goal <160, Hypothyroidism, unspecified type       simvastatin 20 MG tablet    ZOCOR    90 tablet    Take 1 tablet (20 mg) by mouth At Bedtime    Benign essential hypertension, Hyperlipidemia LDL goal <160, Hypothyroidism, unspecified type

## 2018-01-11 NOTE — PROGRESS NOTES
SUBJECTIVE:   Idalia Howe is a 62 year old female who presents to clinic today for the following health issues:      RESPIRATORY SYMPTOMS      Duration: 3 weeks     Description  sore throat, facial pain/pressure and cough    Severity: moderate    Accompanying signs and symptoms: None    History (predisposing factors):  none    Precipitating or alleviating factors: cough is worse at night     Therapies tried and outcome:  Robitussin DM, Tylenol, Zyrtec         Problem list and histories reviewed & adjusted, as indicated.  Additional history: as documented    Patient Active Problem List   Diagnosis     Hyperlipidemia LDL goal <160     Lumbar spondylosis     Lumbar disc disease with radiculopathy     DJD (degenerative joint disease) of cervical spine     Malignant neoplasm of thyroid gland (H)     Nonspecific reaction to tuberculin test     Cyst of ovary     History of malignant neoplasm of thyroid     Postoperative hypothyroidism     Acute bronchitis     Chronic bronchitis (H)     Chronic rhinitis     No past surgical history on file.    Social History   Substance Use Topics     Smoking status: Never Smoker     Smokeless tobacco: Never Used     Alcohol use No     No family history on file.      Current Outpatient Prescriptions   Medication Sig Dispense Refill     cefdinir (OMNICEF) 300 MG capsule Take 2 capsules (600 mg) by mouth daily 20 capsule 0     guaiFENesin-codeine (ROBITUSSIN AC) 100-10 MG/5ML SOLN solution Take 5 mLs by mouth every 4 hours as needed for cough 240 mL 0     chlorthalidone (HYGROTON) 25 MG tablet TAKE 1/2 TABLET(12.5 MG) BY MOUTH DAILY 45 tablet 1     losartan (COZAAR) 100 MG tablet Take 1 tablet (100 mg) by mouth daily 90 tablet 1     simvastatin (ZOCOR) 20 MG tablet Take 1 tablet (20 mg) by mouth At Bedtime 90 tablet 1     Levothyroxine Sodium 112 MCG CAPS Take 112 mcg by mouth daily 90 capsule 1     [DISCONTINUED] chlorthalidone (HYGROTON) 25 MG tablet TAKE 1/2 TABLET(12.5 MG) BY MOUTH  "DAILY 45 tablet 1     [DISCONTINUED] losartan (COZAAR) 100 MG tablet Take 1 tablet (100 mg) by mouth daily 90 tablet 1     [DISCONTINUED] simvastatin (ZOCOR) 20 MG tablet Take 1 tablet (20 mg) by mouth At Bedtime 90 tablet 1     [DISCONTINUED] Levothyroxine Sodium 112 MCG CAPS Take 112 mcg by mouth daily 90 capsule 1     No Known Allergies  Recent Labs   Lab Test  01/23/17   1021 12/15/14 11/15/13   LDL  95  109  179*   HDL  66  57  56   TRIG  143  152  149   ALT  22  37  22   CR  0.75  0.64  0.70   GFRESTIMATED  78  >60  >60   GFRESTBLACK  >90   GFR Calc    >60  >60   POTASSIUM  3.7  3.9  3.9   TSH  0.02*  <0.015  8.115      BP Readings from Last 3 Encounters:   01/11/18 120/80   08/01/17 135/82   05/11/17 132/80    Wt Readings from Last 3 Encounters:   01/11/18 147 lb (66.7 kg)   08/01/17 150 lb (68 kg)   05/11/17 152 lb (68.9 kg)           Reviewed and updated as needed this visit by clinical staff       Reviewed and updated as needed this visit by Provider         ROS:  Constitutional, HEENT, cardiovascular, pulmonary, gi and gu systems are negative, except as otherwise noted.      OBJECTIVE:   /80 (Cuff Size: Adult Regular)  Pulse 66  Temp 97.9  F (36.6  C) (Tympanic)  Resp 14  Ht 5' 3\" (1.6 m)  Wt 147 lb (66.7 kg)  SpO2 98%  BMI 26.04 kg/m2  Body mass index is 26.04 kg/(m^2).  GENERAL: healthy, alert and no distress  NECK: no adenopathy, no asymmetry, masses, or scars and thyroid normal to palpation  RESP: lungs clear to auscultation - no rales, rhonchi or wheezes  CV: regular rate and rhythm, normal S1 S2, no S3 or S4, no murmur, click or rub, no peripheral edema and peripheral pulses strong  ABDOMEN: soft, nontender, no hepatosplenomegaly, no masses and bowel sounds normal  MS: no gross musculoskeletal defects noted, no edema        ASSESSMENT/PLAN:   ASSESSMENT / PLAN:  (R30.0) Dysuria  (primary encounter diagnosis)  Comment: has positive sign of UTI, will treat with abx   Plan: " UA reflex to Microscopic and Culture, Urine         Microscopic, Urine Culture Aerobic Bacterial              (R60.9) Edema, unspecified type  Comment: generalized edema with tiredness, has no other sign of cardiopulmonic abnormality, will review the lab and update pt  Plan: Comprehensive metabolic panel, CBC with         platelets and differential, TSH with free T4         reflex            (R53.83) Tired  Comment: mentioned above   Plan: Comprehensive metabolic panel, CBC with         platelets and differential, TSH with free T4         reflex            (M19.90) Arthritis  Comment: has generalized joint pain with tenderness, has morning stiffness, will have her to check lab for further evaluation   Plan: Rheumatoid factor, ESR: Erythrocyte         sedimentation rate, CRP, inflammation            (R45.89) Dysphoric mood  Comment: feeling down and tired, has no HI/SI, will have her to see mental health for BCT  Plan: MENTAL HEALTH REFERRAL  - Adult; Assessments         and Testing; General Psychological Testing;         G: [facility] [phone]; We will contact you to schedule the         appointment or please call with any questions            (J20.9) Acute bronchitis with symptoms > 10 days  Comment: has wheezing throughout the lung bilateral, will have her to start abx  Plan: cefdinir (OMNICEF) 300 MG capsule,         guaiFENesin-codeine (ROBITUSSIN AC) 100-10         MG/5ML SOLN solution            (R05) Cough  Comment: mentioned above   Plan: guaiFENesin-codeine (ROBITUSSIN AC) 100-10         MG/5ML SOLN solution            (I10) Benign essential hypertension  Comment: stable with current dose of meds, will keep watching sx   Plan: chlorthalidone (HYGROTON) 25 MG tablet,         losartan (COZAAR) 100 MG tablet, simvastatin         (ZOCOR) 20 MG tablet, Levothyroxine Sodium 112         MCG CAPS            (E78.5) Hyperlipidemia LDL goal <160  Comment: stable   Plan: chlorthalidone  (HYGROTON) 25 MG tablet,         losartan (COZAAR) 100 MG tablet, simvastatin         (ZOCOR) 20 MG tablet, Levothyroxine Sodium 112         MCG CAPS            (E03.9) Hypothyroidism, unspecified type  Comment: feeling more tired and has generalized edema, will check lab and update pt   Plan: chlorthalidone (HYGROTON) 25 MG tablet,         losartan (COZAAR) 100 MG tablet, simvastatin         (ZOCOR) 20 MG tablet, Levothyroxine Sodium 112         MCG CAPS                FUTURE APPOINTMENTS:       - Follow-up visit in 2-3 months for CPE    kAhil Almodovar MD  [facility] [place]

## 2018-01-11 NOTE — NURSING NOTE
"Chief Complaint   Patient presents with     URI     UTI       Initial /80 (Cuff Size: Adult Regular)  Pulse 66  Temp 97.9  F (36.6  C) (Tympanic)  Resp 14  Ht 5' 3\" (1.6 m)  Wt 147 lb (66.7 kg)  SpO2 98%  BMI 26.04 kg/m2 Estimated body mass index is 26.04 kg/(m^2) as calculated from the following:    Height as of this encounter: 5' 3\" (1.6 m).    Weight as of this encounter: 147 lb (66.7 kg).  Medication Reconciliation: complete   Shelly Young, CMA    "

## 2018-01-12 ENCOUNTER — OFFICE VISIT (OUTPATIENT)
Dept: FAMILY MEDICINE | Facility: CLINIC | Age: 63
End: 2018-01-12
Payer: COMMERCIAL

## 2018-01-12 VITALS
TEMPERATURE: 97.8 F | BODY MASS INDEX: 25.87 KG/M2 | WEIGHT: 146 LBS | DIASTOLIC BLOOD PRESSURE: 73 MMHG | RESPIRATION RATE: 12 BRPM | HEART RATE: 63 BPM | OXYGEN SATURATION: 100 % | HEIGHT: 63 IN | SYSTOLIC BLOOD PRESSURE: 111 MMHG

## 2018-01-12 DIAGNOSIS — M47.22 OSTEOARTHRITIS OF SPINE WITH RADICULOPATHY, CERVICAL REGION: Primary | ICD-10-CM

## 2018-01-12 DIAGNOSIS — M51.16 LUMBAR DISC DISEASE WITH RADICULOPATHY: ICD-10-CM

## 2018-01-12 DIAGNOSIS — M47.816 LUMBAR SPONDYLOSIS: ICD-10-CM

## 2018-01-12 LAB
ALBUMIN SERPL-MCNC: 3.8 G/DL (ref 3.4–5)
ALP SERPL-CCNC: 69 U/L (ref 40–150)
ALT SERPL W P-5'-P-CCNC: 23 U/L (ref 0–50)
ANION GAP SERPL CALCULATED.3IONS-SCNC: 9 MMOL/L (ref 3–14)
AST SERPL W P-5'-P-CCNC: 17 U/L (ref 0–45)
BACTERIA SPEC CULT: ABNORMAL
BILIRUB SERPL-MCNC: 0.6 MG/DL (ref 0.2–1.3)
BUN SERPL-MCNC: 13 MG/DL (ref 7–30)
CALCIUM SERPL-MCNC: 8.7 MG/DL (ref 8.5–10.1)
CHLORIDE SERPL-SCNC: 106 MMOL/L (ref 94–109)
CO2 SERPL-SCNC: 25 MMOL/L (ref 20–32)
CREAT SERPL-MCNC: 0.55 MG/DL (ref 0.52–1.04)
GFR SERPL CREATININE-BSD FRML MDRD: >90 ML/MIN/1.7M2
GLUCOSE SERPL-MCNC: 108 MG/DL (ref 70–99)
POTASSIUM SERPL-SCNC: 3.6 MMOL/L (ref 3.4–5.3)
PROT SERPL-MCNC: 7.3 G/DL (ref 6.8–8.8)
RHEUMATOID FACT SER NEPH-ACNC: <20 IU/ML (ref 0–20)
SODIUM SERPL-SCNC: 140 MMOL/L (ref 133–144)
SPECIMEN SOURCE: ABNORMAL
T4 FREE SERPL-MCNC: 1.85 NG/DL (ref 0.76–1.46)
TSH SERPL DL<=0.005 MIU/L-ACNC: 0.01 MU/L (ref 0.4–4)

## 2018-01-12 PROCEDURE — 99214 OFFICE O/P EST MOD 30 MIN: CPT | Performed by: FAMILY MEDICINE

## 2018-01-12 NOTE — PROGRESS NOTES
SUBJECTIVE:   Idalia Howe is a 62 year old female who presents to clinic today for the following health issues:        Follow-Up Back Pain        Description (location/character/radiation): Pt states her symptoms are the same.          Problem list and histories reviewed & adjusted, as indicated.  Additional history: as documented    Patient Active Problem List   Diagnosis     Hyperlipidemia LDL goal <160     Lumbar spondylosis     Lumbar disc disease with radiculopathy     DJD (degenerative joint disease) of cervical spine     Malignant neoplasm of thyroid gland (H)     Nonspecific reaction to tuberculin test     Cyst of ovary     History of malignant neoplasm of thyroid     Postoperative hypothyroidism     Acute bronchitis     Chronic bronchitis (H)     Chronic rhinitis     No past surgical history on file.    Social History   Substance Use Topics     Smoking status: Never Smoker     Smokeless tobacco: Never Used     Alcohol use No     No family history on file.      Current Outpatient Prescriptions   Medication Sig Dispense Refill     cefdinir (OMNICEF) 300 MG capsule Take 2 capsules (600 mg) by mouth daily 20 capsule 0     guaiFENesin-codeine (ROBITUSSIN AC) 100-10 MG/5ML SOLN solution Take 5 mLs by mouth every 4 hours as needed for cough 240 mL 0     chlorthalidone (HYGROTON) 25 MG tablet TAKE 1/2 TABLET(12.5 MG) BY MOUTH DAILY 45 tablet 1     losartan (COZAAR) 100 MG tablet Take 1 tablet (100 mg) by mouth daily 90 tablet 1     simvastatin (ZOCOR) 20 MG tablet Take 1 tablet (20 mg) by mouth At Bedtime 90 tablet 1     Levothyroxine Sodium 112 MCG CAPS Take 112 mcg by mouth daily 90 capsule 1     No Known Allergies  Recent Labs   Lab Test  01/11/18   1405  01/23/17   1021 12/15/14 11/15/13   LDL   --   95  109  179*   HDL   --   66  57  56   TRIG   --   143  152  149   ALT  23  22  37  22   CR  0.55  0.75  0.64  0.70   GFRESTIMATED  >90  78  >60  >60   GFRESTBLACK  >90  >90  African American GFR Calc    >60   ">60   POTASSIUM  3.6  3.7  3.9  3.9   TSH  0.01*  0.02*  <0.015  8.115      BP Readings from Last 3 Encounters:   01/12/18 111/73   01/11/18 120/80   08/01/17 135/82    Wt Readings from Last 3 Encounters:   01/12/18 146 lb (66.2 kg)   01/11/18 147 lb (66.7 kg)   08/01/17 150 lb (68 kg)            Reviewed and updated as needed this visit by clinical staff     Reviewed and updated as needed this visit by Provider         ROS:  Constitutional, HEENT, cardiovascular, pulmonary, gi and gu systems are negative, except as otherwise noted.      OBJECTIVE:   /73 (Cuff Size: Adult Large)  Pulse 63  Temp 97.8  F (36.6  C) (Tympanic)  Resp 12  Ht 5' 3\" (1.6 m)  Wt 146 lb (66.2 kg)  SpO2 100%  BMI 25.86 kg/m2  Body mass index is 25.86 kg/(m^2).  GENERAL: healthy, alert and no distress  NECK: no adenopathy, no asymmetry, masses, or scars and thyroid normal to palpation  RESP: lungs clear to auscultation - no rales, rhonchi or wheezes  CV: regular rate and rhythm, normal S1 S2, no S3 or S4, no murmur, click or rub, no peripheral edema and peripheral pulses strong  ABDOMEN: soft, nontender, no hepatosplenomegaly, no masses and bowel sounds normal  MS: no gross musculoskeletal defects noted, no edema        ASSESSMENT/PLAN:   Idalia was seen today for back pain.    Diagnoses and all orders for this visit:    Osteoarthritis of spine with radiculopathy, cervical region    Lumbar spondylosis    Lumbar disc disease with radiculopathy    Other orders  -     Cancel: Hepatitis C Screen Reflex to HCV RNA Quant and Genotype      Has been stable with current range of work limitation, has no additional injury  Will have her to keep staying the current level of work limitation for next 6 months and will recheck     A total time of 27 minutes was spent with the patient today. Of this time More than 50% was spent counseling and coordinating of care of the above concerns.      Akhil Almodovar MD  [facility] [place]  "

## 2018-01-12 NOTE — MR AVS SNAPSHOT
After Visit Summary   1/12/2018    Idalia Howe    MRN: [id number]           Patient Information     Date Of Birth          1955        Visit Information        Provider Department      1/12/2018 12:20 PM Akhil Almodovar MD [facility] [place]        Today's Diagnoses     Osteoarthritis of spine with radiculopathy, cervical region    -  1    Lumbar spondylosis        Lumbar disc disease with radiculopathy           Follow-ups after your visit        Follow-up notes from your care team     Return in about 6 months (around 7/12/2018) for Routine Visit, WC.      Your next 10 appointments already scheduled     Mar 15, 2018 12:00 PM CDT   New Visit with Edilma Copeland [facility] [place] ([facility] [place])    [address]  [place] [address]   [phone]            Apr 03, 2018 10:00 AM CDT   Return Visit with Edilma Copeland [facility] [place] ([facility] [place])    [address]  [place] [address]   [phone]              Who to contact     If you have questions or need follow up information about today's clinic visit or your schedule please contact [facility] [place] [place] directly at [phone].  Normal or non-critical lab and imaging results will be communicated to you by [employer], letter or phone within 4 business days after the clinic has received the results. If you do not hear from us within 7 days, please contact the clinic through [employer] or phone. If you have a critical or abnormal lab result, we will notify you by phone as soon as possible.  Submit refill requests through [employer] or call your pharmacy and they will forward the refill request to us. Please allow 3 business days for your refill to be completed.          Additional Information About Your Visit        [employer] Information     [employer] lets you send messages to your doctor, view your test results, renew your prescriptions, schedule appointments and  "more. To sign up, go to [email or web address] . Click on \"Log in\" on the left side of the screen, which will take you to the Welcome page. Then click on \"Sign up Now\" on the right side of the page.     You will be asked to enter the access code listed below, as well as some personal information. Please follow the directions to create your username and password.     Your access code is: [email or web address]  Expires: 2018  2:04 PM     Your access code will  in 90 days. If you need help or a new code, please call your [place] clinic or [phone].        Care EveryWhere ID     This is your Care EveryWhere ID. This could be used by other organizations to access your [place] medical records  [id number]        Your Vitals Were     Pulse Temperature Respirations Height Pulse Oximetry BMI (Body Mass Index)    63 97.8  F (36.6  C) (Tympanic) 12 5' 3\" (1.6 m) 100% 25.86 kg/m2       Blood Pressure from Last 3 Encounters:   18 111/73   18 120/80   17 135/82    Weight from Last 3 Encounters:   18 146 lb (66.2 kg)   18 147 lb (66.7 kg)   17 150 lb (68 kg)              Today, you had the following     No orders found for display       Primary Care Provider Office Phone # Fax #    Akhil Almodovar [phone]       [address] [facility] [address]        Equal Access to Services     [facility]: Hadii avel santos hadasho Soomaali, waaxda luqadaha, qaybta kaalmada lela, ludwin maciel . So [facility] [phone].    ATENCIÓN: Si habla español, tiene a lopez disposición servicios gratuitos de asistencia lingüística. Llame al [phone].    We comply with applicable federal civil rights laws and [place] laws. We do not discriminate on the basis of race, color, national origin, age, disability, sex, sexual orientation, or gender identity.            Thank you!     Thank you for choosing [facility]  for your care. Our " goal is always to provide you with excellent care. Hearing back from our patients is one way we can continue to improve our services. Please take a few minutes to complete the written survey that you may receive in the mail after your visit with us. Thank you!             Your Updated Medication List - Protect others around you: Learn how to safely use, store and throw away your medicines at www.disposemymeds.org.          This list is accurate as of: 1/12/18 12:44 PM.  Always use your most recent med list.                   Brand Name Dispense Instructions for use Diagnosis    cefdinir 300 MG capsule    OMNICEF    20 capsule    Take 2 capsules (600 mg) by mouth daily    Acute bronchitis with symptoms > 10 days, Urinary tract infection without hematuria, site unspecified       chlorthalidone 25 MG tablet    HYGROTON    45 tablet    TAKE 1/2 TABLET(12.5 MG) BY MOUTH DAILY    Benign essential hypertension, Hyperlipidemia LDL goal <160, Hypothyroidism, unspecified type       guaiFENesin-codeine 100-10 MG/5ML Soln solution    ROBITUSSIN AC    240 mL    Take 5 mLs by mouth every 4 hours as needed for cough    Acute bronchitis with symptoms > 10 days, Cough       Levothyroxine Sodium 112 MCG Caps     90 capsule    Take 112 mcg by mouth daily    Benign essential hypertension, Hyperlipidemia LDL goal <160, Hypothyroidism, unspecified type       losartan 100 MG tablet    COZAAR    90 tablet    Take 1 tablet (100 mg) by mouth daily    Benign essential hypertension, Hyperlipidemia LDL goal <160, Hypothyroidism, unspecified type       simvastatin 20 MG tablet    ZOCOR    90 tablet    Take 1 tablet (20 mg) by mouth At Bedtime    Benign essential hypertension, Hyperlipidemia LDL goal <160, Hypothyroidism, unspecified type

## 2018-01-12 NOTE — LETTER
[address] [address]  Phone: [phone]    January 12, 2018        Idalia Howe  [address] [place] [address]          To whom it may concern:    RE: Idalia Friedman has been working with current restrictive status since last time. She has worked with repetitive movement including bending back and neck/squatting legs/stretching shoulders and arms/frequent rotation of back and neck related with current position at work. 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 So, I recommend her that she should maintain the level of activity in carrying and level lifting as noted below. Then, we will reassess the functional status in 6 months or earlier as needed.       - May work up to 8 hours per day.  - Sitting - standard chair but stretching and positional changes as needed   - Standing and walking - stretching or resting 10 minutes every 30 minutes   - Carrying and level lifting - less than 10 lbs, occasionally(less than 50% of the day's shift). For example, at '[employer]' at her work, she cannot perform more than five trays per one session per day, and not more than three sessions per day    - Bending and lifting - may lift up to 15 lbs on occasional basis, up to 5 times per hour   - Pushing and pulling - less than 15 lbs at height between waist and chest and without bending forward      Please contact me for questions or concerns.      Sincerely,      Akhil Almodovar MD

## 2018-01-12 NOTE — NURSING NOTE
"Chief Complaint   Patient presents with     Back Pain       Initial /73 (Cuff Size: Adult Large)  Pulse 63  Temp 97.8  F (36.6  C) (Tympanic)  Resp 12  Ht 5' 3\" (1.6 m)  Wt 146 lb (66.2 kg)  SpO2 100%  BMI 25.86 kg/m2 Estimated body mass index is 25.86 kg/(m^2) as calculated from the following:    Height as of this encounter: 5' 3\" (1.6 m).    Weight as of this encounter: 146 lb (66.2 kg).  Medication Reconciliation: complete   Shelly Young, CMA    "

## 2018-01-27 DIAGNOSIS — I10 BENIGN ESSENTIAL HYPERTENSION: ICD-10-CM

## 2018-01-29 NOTE — TELEPHONE ENCOUNTER
"Requested Prescriptions   Pending Prescriptions Disp Refills     chlorthalidone (HYGROTON) 25 MG tablet [Pharmacy Med Name: CHLORTHALIDONE 25MG TABLETS]  Last Written Prescription Date:  1/11/18  Last Fill Quantity: 45,  # refills: 1   Last Office Visit with SARAH provider:  1/12/18   Future Office Visit:    Next 5 appointments (look out 90 days)     Apr 03, 2018 10:00 AM CDT   Return Visit with Edilma Copeland LP   [facility] [place] ([facility])    [address]  [facility] [address]   [phone]                  45 tablet 0     Sig: TAKE 1/2 TABLET(12.5 MG) BY MOUTH DAILY    Diuretics (Including Combos) Protocol Passed    1/27/2018 11:36 AM       Passed - Blood pressure under 140/90    BP Readings from Last 3 Encounters:   01/12/18 111/73   01/11/18 120/80   08/01/17 135/82                Passed - Recent or future visit with authorizing provider's specialty    Patient had office visit in the last year or has a visit in the next 30 days with authorizing provider.  See \"Patient Info\" tab in inbasket, or \"Choose Columns\" in Meds & Orders section of the refill encounter.            Passed - Patient is age 18 or older       Passed - No active pregancy on record       Passed - Normal serum creatinine on file in past 12 months    Recent Labs   Lab Test  01/11/18   1405   CR  0.55             Passed - Normal serum potassium on file in past 12 months    Recent Labs   Lab Test  01/11/18   1405   POTASSIUM  3.6                   Passed - Normal serum sodium on file in past 12 months    Recent Labs   Lab Test  01/11/18   1405   NA  140             Passed - No positive pregnancy test in past 12 months          "

## 2018-01-30 RX ORDER — CHLORTHALIDONE 25 MG/1
TABLET ORAL
Qty: 45 TABLET | Refills: 0 | OUTPATIENT
Start: 2018-01-30

## 2018-02-02 ENCOUNTER — OFFICE VISIT (OUTPATIENT)
Dept: FAMILY MEDICINE | Facility: CLINIC | Age: 63
End: 2018-02-02
Payer: COMMERCIAL

## 2018-02-02 VITALS
DIASTOLIC BLOOD PRESSURE: 89 MMHG | HEIGHT: 63 IN | RESPIRATION RATE: 14 BRPM | BODY MASS INDEX: 25.87 KG/M2 | OXYGEN SATURATION: 99 % | HEART RATE: 60 BPM | TEMPERATURE: 97.8 F | SYSTOLIC BLOOD PRESSURE: 135 MMHG | WEIGHT: 146 LBS

## 2018-02-02 DIAGNOSIS — J32.9 PURULENT POSTNASAL DRAINAGE: Primary | ICD-10-CM

## 2018-02-02 DIAGNOSIS — R05.9 COUGH: ICD-10-CM

## 2018-02-02 DIAGNOSIS — R06.02 SOB (SHORTNESS OF BREATH): ICD-10-CM

## 2018-02-02 PROCEDURE — 99213 OFFICE O/P EST LOW 20 MIN: CPT | Performed by: FAMILY MEDICINE

## 2018-02-02 RX ORDER — MONTELUKAST SODIUM 10 MG/1
10 TABLET ORAL AT BEDTIME
Qty: 30 TABLET | Refills: 1 | Status: SHIPPED | OUTPATIENT
Start: 2018-02-02 | End: 2018-04-09

## 2018-02-02 RX ORDER — IPRATROPIUM BROMIDE 42 UG/1
2 SPRAY, METERED NASAL 4 TIMES DAILY PRN
Qty: 1 BOX | Refills: 1 | Status: SHIPPED | OUTPATIENT
Start: 2018-02-02 | End: 2018-02-17

## 2018-02-02 NOTE — NURSING NOTE
"Chief Complaint   Patient presents with     RECHECK       Initial /89 (Cuff Size: Adult Regular)  Pulse 60  Temp 97.8  F (36.6  C) (Tympanic)  Resp 14  Ht 5' 3\" (1.6 m)  Wt 146 lb (66.2 kg)  SpO2 99%  BMI 25.86 kg/m2 Estimated body mass index is 25.86 kg/(m^2) as calculated from the following:    Height as of this encounter: 5' 3\" (1.6 m).    Weight as of this encounter: 146 lb (66.2 kg).  Medication Reconciliation: complete   Shelly Young, CMA    "

## 2018-02-02 NOTE — MR AVS SNAPSHOT
After Visit Summary   2/2/2018    Idalia Howe    MRN: [id number]           Patient Information     Date Of Birth          1955        Visit Information        Provider Department      2/2/2018 11:20 AM Akhil Almodovar MD [facility] [place]        Today's Diagnoses     Purulent postnasal drainage    -  1    Cough        SOB (shortness of breath)           Follow-ups after your visit        Follow-up notes from your care team     Return if symptoms worsen or fail to improve.      Your next 10 appointments already scheduled     Mar 15, 2018 12:00 PM CDT   New Visit with Edilma Copeland [facility] [place] ([facility])    [address]  [place] [address]   [phone]            Apr 03, 2018 10:00 AM CDT   Return Visit with Edilma Copeland [facility] [place] ([facility])    [address]  [place] [address]   [phone]              Future tests that were ordered for you today     Open Future Orders        Priority Expected Expires Ordered    CT Chest w/o Contrast Routine  2/2/2019 2/2/2018            Who to contact     If you have questions or need follow up information about today's clinic visit or your schedule please contact [facility] [place] directly at [phone].  Normal or non-critical lab and imaging results will be communicated to you by Lytrohart, letter or phone within 4 business days after the clinic has received the results. If you do not hear from us within 7 days, please contact the clinic through MyChart or phone. If you have a critical or abnormal lab result, we will notify you by phone as soon as possible.  Submit refill requests through Enubila or call your pharmacy and they will forward the refill request to us. Please allow 3 business days for your refill to be completed.          Additional Information About Your Visit        Enubila Information     Enubila lets you send messages to  "your doctor, view your test results, renew your prescriptions, schedule appointments and more. To sign up, go to [email or web address] . Click on \"Log in\" on the left side of the screen, which will take you to the Welcome page. Then click on \"Sign up Now\" on the right side of the page.     You will be asked to enter the access code listed below, as well as some personal information. Please follow the directions to create your username and password.     Your access code is: [email or web address]  Expires: 2018  2:04 PM     Your access code will  in 90 days. If you need help or a new code, please call your [place] clinic or [phone].        Care EveryWhere ID     This is your Care EveryWhere ID. This could be used by other organizations to access your [place] medical records  [id number]        Your Vitals Were     Pulse Temperature Respirations Height Pulse Oximetry BMI (Body Mass Index)    60 97.8  F (36.6  C) (Tympanic) 14 5' 3\" (1.6 m) 99% 25.86 kg/m2       Blood Pressure from Last 3 Encounters:   18 135/89   18 111/73   18 120/80    Weight from Last 3 Encounters:   18 146 lb (66.2 kg)   18 146 lb (66.2 kg)   18 147 lb (66.7 kg)                 Today's Medication Changes          These changes are accurate as of 18 11:57 AM.  If you have any questions, ask your nurse or doctor.               Start taking these medicines.        Dose/Directions    ipratropium 0.06 % spray   Commonly known as:  ATROVENT   Used for:  Purulent postnasal drainage   Started by:  Akhil Almodovar MD        Dose:  2 spray   Spray 2 sprays into both nostrils 4 times daily as needed for rhinitis   Quantity:  1 Box   Refills:  1       montelukast 10 MG tablet   Commonly known as:  SINGULAIR   Used for:  Purulent postnasal drainage   Started by:  Akhil Almodovar MD        Dose:  10 mg   Take 1 tablet (10 mg) by mouth At Bedtime   Quantity:  30 tablet   Refills:  1            Where to get your " medicines      These medications were sent to [facility] [id number] [facility] [address] [place] [address] E AT [facility]  & [facility]  [address] [facility] [address], [place] [address]     Phone:  [phone]     ipratropium 0.06 % spray    montelukast 10 MG tablet                Primary Care Provider Office Phone # Fax #    Akhil BRANDY Almodovar [phone]       [address] [facility] [address]        Equal Access to Services     [facility]: Hadii aad ku hadasho Soomaali, waaxda luqadaha, qaybta kaalmada adeegyada, ludwin hernandez haycal maciel . So [facility] [phone].    ATENCIÓN: Si habla español, tiene a lopez disposición servicios gratuitos de asistencia lingüística. [facility] [phone].    We comply with applicable federal civil rights laws and [place] laws. We do not discriminate on the basis of race, color, national origin, age, disability, sex, sexual orientation, or gender identity.            Thank you!     Thank you for choosing [facility]  for your care. Our goal is always to provide you with excellent care. Hearing back from our patients is one way we can continue to improve our services. Please take a few minutes to complete the written survey that you may receive in the mail after your visit with us. Thank you!             Your Updated Medication List - Protect others around you: Learn how to safely use, store and throw away your medicines at www.disposemymeds.org.          This list is accurate as of 2/2/18 11:57 AM.  Always use your most recent med list.                   Brand Name Dispense Instructions for use Diagnosis    chlorthalidone 25 MG tablet    HYGROTON    45 tablet    TAKE 1/2 TABLET(12.5 MG) BY MOUTH DAILY    Benign essential hypertension, Hyperlipidemia LDL goal <160, Hypothyroidism, unspecified type       guaiFENesin-codeine 100-10 MG/5ML Soln solution    ROBITUSSIN AC    240 mL    Take 5 mLs by mouth every 4 hours as needed  for cough    Acute bronchitis with symptoms > 10 days, Cough       ipratropium 0.06 % spray    ATROVENT    1 Box    Spray 2 sprays into both nostrils 4 times daily as needed for rhinitis    Purulent postnasal drainage       Levothyroxine Sodium 112 MCG Caps     90 capsule    Take 112 mcg by mouth daily    Benign essential hypertension, Hyperlipidemia LDL goal <160, Hypothyroidism, unspecified type       losartan 100 MG tablet    COZAAR    90 tablet    Take 1 tablet (100 mg) by mouth daily    Benign essential hypertension, Hyperlipidemia LDL goal <160, Hypothyroidism, unspecified type       montelukast 10 MG tablet    SINGULAIR    30 tablet    Take 1 tablet (10 mg) by mouth At Bedtime    Purulent postnasal drainage       simvastatin 20 MG tablet    ZOCOR    90 tablet    Take 1 tablet (20 mg) by mouth At Bedtime    Benign essential hypertension, Hyperlipidemia LDL goal <160, Hypothyroidism, unspecified type

## 2018-02-02 NOTE — PROGRESS NOTES
SUBJECTIVE:   Idalia Howe is a 62 year old female who presents to clinic today for the following health issues:      RESPIRATORY SYMPTOMS FOLLOW UP        Description  cough  Pt seen on 1/11/18. She felt better for 2-3 days, but her cough is back.       Problem list and histories reviewed & adjusted, as indicated.  Additional history: as documented    Patient Active Problem List   Diagnosis     Hyperlipidemia LDL goal <160     Lumbar spondylosis     Lumbar disc disease with radiculopathy     DJD (degenerative joint disease) of cervical spine     Malignant neoplasm of thyroid gland (H)     Nonspecific reaction to tuberculin test     Cyst of ovary     History of malignant neoplasm of thyroid     Postoperative hypothyroidism     Acute bronchitis     Chronic bronchitis (H)     Chronic rhinitis     No past surgical history on file.    Social History   Substance Use Topics     Smoking status: Never Smoker     Smokeless tobacco: Never Used     Alcohol use No     No family history on file.      Current Outpatient Prescriptions   Medication Sig Dispense Refill     montelukast (SINGULAIR) 10 MG tablet Take 1 tablet (10 mg) by mouth At Bedtime 30 tablet 1     ipratropium (ATROVENT) 0.06 % spray Spray 2 sprays into both nostrils 4 times daily as needed for rhinitis 1 Box 1     guaiFENesin-codeine (ROBITUSSIN AC) 100-10 MG/5ML SOLN solution Take 5 mLs by mouth every 4 hours as needed for cough 240 mL 0     chlorthalidone (HYGROTON) 25 MG tablet TAKE 1/2 TABLET(12.5 MG) BY MOUTH DAILY 45 tablet 1     losartan (COZAAR) 100 MG tablet Take 1 tablet (100 mg) by mouth daily 90 tablet 1     simvastatin (ZOCOR) 20 MG tablet Take 1 tablet (20 mg) by mouth At Bedtime 90 tablet 1     Levothyroxine Sodium 112 MCG CAPS Take 112 mcg by mouth daily 90 capsule 1     No Known Allergies  Recent Labs   Lab Test  01/11/18   1405  01/23/17   1021 12/15/14 11/15/13   LDL   --   95  109  179*   HDL   --   66  57  56   TRIG   --   143  152  149  "  ALT  23  22  37  22   CR  0.55  0.75  0.64  0.70   GFRESTIMATED  >90  78  >60  >60   GFRESTBLACK  >90  >90  African American GFR Calc    >60  >60   POTASSIUM  3.6  3.7  3.9  3.9   TSH  0.01*  0.02*  <0.015  8.115      BP Readings from Last 3 Encounters:   02/02/18 135/89   01/12/18 111/73   01/11/18 120/80    Wt Readings from Last 3 Encounters:   02/02/18 146 lb (66.2 kg)   01/12/18 146 lb (66.2 kg)   01/11/18 147 lb (66.7 kg)                    Reviewed and updated as needed this visit by clinical staff       Reviewed and updated as needed this visit by Provider         ROS:  Constitutional, HEENT, cardiovascular, pulmonary, gi and gu systems are negative, except as otherwise noted.    OBJECTIVE:     /89 (Cuff Size: Adult Regular)  Pulse 60  Temp 97.8  F (36.6  C) (Tympanic)  Resp 14  Ht 5' 3\" (1.6 m)  Wt 146 lb (66.2 kg)  SpO2 99%  BMI 25.86 kg/m2  Body mass index is 25.86 kg/(m^2).  GENERAL: healthy, alert and no distress  HENT: normal cephalic/atraumatic, ear canals and TM's normal, nose and mouth without ulcers or lesions and purulent postnasal drainage   NECK: no adenopathy, no asymmetry, masses, or scars and thyroid normal to palpation  RESP: lungs clear to auscultation - no rales, rhonchi or wheezes  CV: regular rate and rhythm, normal S1 S2, no S3 or S4, no murmur, click or rub, no peripheral edema and peripheral pulses strong  ABDOMEN: soft, nontender, no hepatosplenomegaly, no masses and bowel sounds normal  MS: no gross musculoskeletal defects noted, no edema        ASSESSMENT/PLAN:   Idalia was seen today for recheck.    Diagnoses and all orders for this visit:    Purulent postnasal drainage  -     montelukast (SINGULAIR) 10 MG tablet; Take 1 tablet (10 mg) by mouth At Bedtime  -     ipratropium (ATROVENT) 0.06 % spray; Spray 2 sprays into both nostrils 4 times daily as needed for rhinitis  -     CT Chest w/o Contrast; Future    Cough  -     CT Chest w/o Contrast; Future    SOB " (shortness of breath)  -     CT Chest w/o Contrast; Future    Other orders  -     Cancel: XR Chest 2 Views; Future      Has no sign of infection but having thick purulent postnasal drainage,   Will have her to try Atrovent spray with Singulair combination   Will also check chest CT for further evaluation       Akhil Almodovar MD  [facility]

## 2018-02-12 ENCOUNTER — HOSPITAL ENCOUNTER (OUTPATIENT)
Dept: CT IMAGING | Facility: CLINIC | Age: 63
Discharge: HOME | End: 2018-02-12
Attending: FAMILY MEDICINE | Admitting: FAMILY MEDICINE
Payer: COMMERCIAL

## 2018-02-12 DIAGNOSIS — J32.9 PURULENT POSTNASAL DRAINAGE: ICD-10-CM

## 2018-02-12 DIAGNOSIS — R06.02 SOB (SHORTNESS OF BREATH): ICD-10-CM

## 2018-02-12 DIAGNOSIS — R05.9 COUGH: ICD-10-CM

## 2018-02-12 PROCEDURE — 71250 CT THORAX DX C-: CPT

## 2018-02-17 DIAGNOSIS — J32.9 PURULENT POSTNASAL DRAINAGE: ICD-10-CM

## 2018-02-19 RX ORDER — IPRATROPIUM BROMIDE 42 UG/1
SPRAY, METERED NASAL
Qty: 15 ML | Refills: 0 | Status: SHIPPED | OUTPATIENT
Start: 2018-02-19 | End: 2018-02-26

## 2018-02-19 NOTE — TELEPHONE ENCOUNTER
Ipratropium 0.06% spray      Last Written Prescription Date:  2/2/18  Last Fill Quantity: 1 box,   # refills: 1  Last Office Visit: Scooby  Future Office visit:    Next 5 appointments (look out 90 days)     Apr 03, 2018 10:00 AM CDT   Return Visit with Edilma Copeland LP   [facility] ([facility])    [address]  [address]  [facility] [address]   [phone]                   Routing refill request to provider for review/approval because:  Drug not on the [facility], [facility] or  Health refill protocol or controlled substance    Tiff Adams CMA

## 2018-02-26 DIAGNOSIS — J32.9 PURULENT POSTNASAL DRAINAGE: ICD-10-CM

## 2018-02-26 NOTE — TELEPHONE ENCOUNTER
Atrovent 0.06% spray      Last Written Prescription Date:  2/19/18  Last Fill Quantity: 15ml,   # refills: 0  Last Office Visit: Scooby 2/2/18  Future Office visit:    Next 5 appointments (look out 90 days)     Apr 03, 2018 10:00 AM CDT   Return Visit with Edilma Copeland LP   [facility] ([facility]    [address]  [address]  [facility] [address]   [phone]                   Routing refill request to provider for review/approval because:  Drug not on the [facility], [facility] or [facility] refill protocol or controlled substance    Tiff [facility]

## 2018-02-27 RX ORDER — IPRATROPIUM BROMIDE 42 UG/1
SPRAY, METERED NASAL
Qty: 15 ML | Refills: 1 | Status: SHIPPED | OUTPATIENT
Start: 2018-02-27 | End: 2018-03-20

## 2018-03-20 DIAGNOSIS — J32.9 PURULENT POSTNASAL DRAINAGE: ICD-10-CM

## 2018-03-20 RX ORDER — IPRATROPIUM BROMIDE 42 UG/1
SPRAY, METERED NASAL
Qty: 15 ML | Refills: 3 | Status: SHIPPED | OUTPATIENT
Start: 2018-03-20 | End: 2018-07-17

## 2018-03-20 NOTE — TELEPHONE ENCOUNTER
Requested Prescriptions   Pending Prescriptions Disp Refills     ipratropium (ATROVENT) 0.06 % spray [Pharmacy Med Name: IPRATROPIUM   0.06% DOLLY SP 15ML (165)]  Last Written Prescription Date:  2/27/18  Last Fill Quantity: 15mL,  # refills: 1   Last office visit: 2/2/2018 with prescribing provider:  Scooby   Future Office Visit:     15 mL 0     Sig: USE 2 SPRAYS IN EACH NOSTRIL FOUR TIMES DAILY AS NEEDED FOR RHINITIS    There is no refill protocol information for this order

## 2018-03-20 NOTE — TELEPHONE ENCOUNTER
Routing refill request to provider for review/approval because:  Drug not on the [facility] refill protocol     Tammy Oneal RN

## 2018-06-26 DIAGNOSIS — I10 BENIGN ESSENTIAL HYPERTENSION: ICD-10-CM

## 2018-06-26 DIAGNOSIS — E03.9 HYPOTHYROIDISM, UNSPECIFIED TYPE: ICD-10-CM

## 2018-06-26 DIAGNOSIS — E78.5 HYPERLIPIDEMIA LDL GOAL <160: ICD-10-CM

## 2018-06-26 RX ORDER — LOSARTAN POTASSIUM 100 MG/1
TABLET ORAL
Qty: 90 TABLET | Refills: 1 | Status: SHIPPED | OUTPATIENT
Start: 2018-06-26 | End: 2019-01-09

## 2018-06-26 NOTE — TELEPHONE ENCOUNTER
"Requested Prescriptions   Pending Prescriptions Disp Refills     losartan (COZAAR) 100 MG tablet [Pharmacy Med Name: LOSARTAN 100MG TABLETS]  Last Written Prescription Date:  1/11/18  Last Fill Quantity: 90,  # refills: 1   Last office visit: 2/2/2018 with prescribing provider:  Scooby   Future Office Visit:     90 tablet 0     Sig: TAKE 1 TABLET(100 MG) BY MOUTH DAILY    Angiotensin-II Receptors Passed    6/26/2018  3:10 AM       Passed - Blood pressure under 140/90 in past 12 months    BP Readings from Last 3 Encounters:   02/02/18 135/89   01/12/18 111/73   01/11/18 120/80                Passed - Recent (12 mo) or future (30 days) visit within the authorizing provider's specialty    Patient had office visit in the last 12 months or has a visit in the next 30 days with authorizing provider or within the authorizing provider's specialty.  See \"Patient Info\" tab in inbasket, or \"Choose Columns\" in Meds & Orders section of the refill encounter.           Passed - Patient is age 18 or older       Passed - No active pregnancy on record       Passed - Normal serum creatinine on file in past 12 months    Recent Labs   Lab Test  01/11/18   1405   CR  0.55            Passed - Normal serum potassium on file in past 12 months    Recent Labs   Lab Test  01/11/18   1405   POTASSIUM  3.6                   Passed - No positive pregnancy test in past 12 months          "

## 2018-06-26 NOTE — TELEPHONE ENCOUNTER
Prescription approved per [facility], [facility] or [facility] refill protocol.  Rimma Swann RN - Triage  [facility]

## 2018-07-01 DIAGNOSIS — I10 BENIGN ESSENTIAL HYPERTENSION: ICD-10-CM

## 2018-07-01 DIAGNOSIS — E78.5 HYPERLIPIDEMIA LDL GOAL <160: ICD-10-CM

## 2018-07-01 DIAGNOSIS — E03.9 HYPOTHYROIDISM, UNSPECIFIED TYPE: ICD-10-CM

## 2018-07-02 RX ORDER — CHLORTHALIDONE 25 MG/1
TABLET ORAL
Qty: 45 TABLET | Refills: 1 | Status: SHIPPED | OUTPATIENT
Start: 2018-07-02 | End: 2018-10-09

## 2018-07-02 NOTE — TELEPHONE ENCOUNTER
"Requested Prescriptions   Pending Prescriptions Disp Refills     chlorthalidone (HYGROTON) 25 MG tablet [Pharmacy Med Name: CHLORTHALIDONE 25MG TABLETS]  Last Written Prescription Date:  1/11/18  Last Fill Quantity: 45,  # refills: 1   Last office visit: 2/2/2018 with prescribing provider:  Scooby   Future Office Visit:     45 tablet 0     Sig: TAKE 1/2 TABLET(12.5 MG) BY MOUTH DAILY    Diuretics (Including Combos) Protocol Passed    7/1/2018  3:12 AM       Passed - Blood pressure under 140/90 in past 12 months    BP Readings from Last 3 Encounters:   02/02/18 135/89   01/12/18 111/73   01/11/18 120/80                Passed - Recent (12 mo) or future (30 days) visit within the authorizing provider's specialty    Patient had office visit in the last 12 months or has a visit in the next 30 days with authorizing provider or within the authorizing provider's specialty.  See \"Patient Info\" tab in inbasket, or \"Choose Columns\" in Meds & Orders section of the refill encounter.           Passed - Patient is age 18 or older       Passed - No active pregancy on record       Passed - Normal serum creatinine on file in past 12 months    Recent Labs   Lab Test  01/11/18   1405   CR  0.55             Passed - Normal serum potassium on file in past 12 months    Recent Labs   Lab Test  01/11/18   1405   POTASSIUM  3.6                   Passed - Normal serum sodium on file in past 12 months    Recent Labs   Lab Test  01/11/18   1405   NA  140             Passed - No positive pregnancy test in past 12 months          "

## 2018-07-09 DIAGNOSIS — I10 BENIGN ESSENTIAL HYPERTENSION: ICD-10-CM

## 2018-07-09 DIAGNOSIS — E03.9 HYPOTHYROIDISM, UNSPECIFIED TYPE: ICD-10-CM

## 2018-07-09 DIAGNOSIS — E78.5 HYPERLIPIDEMIA LDL GOAL <160: ICD-10-CM

## 2018-07-09 NOTE — TELEPHONE ENCOUNTER
"Requested Prescriptions   Pending Prescriptions Disp Refills     simvastatin (ZOCOR) 20 MG tablet [Pharmacy Med Name: SIMVASTATIN 20MG TABLETS]  Last Written Prescription Date:  1/11/18  Last Fill Quantity: 90,  # refills: 1   Last office visit: 2/2/2018 with prescribing provider:  Scooby   Future Office Visit:     90 tablet 0     Sig: TAKE 1 TABLET(20 MG) BY MOUTH AT BEDTIME    Statins Protocol Failed    7/9/2018  3:12 AM       Failed - LDL on file in past 12 months    Recent Labs   Lab Test  01/23/17   1021   LDL  95            Passed - No abnormal creatine kinase in past 12 months    No lab results found.            Passed - Recent (12 mo) or future (30 days) visit within the authorizing provider's specialty    Patient had office visit in the last 12 months or has a visit in the next 30 days with authorizing provider or within the authorizing provider's specialty.  See \"Patient Info\" tab in inbasket, or \"Choose Columns\" in Meds & Orders section of the refill encounter.           Passed - Patient is age 18 or older       Passed - No active pregnancy on record       Passed - No positive pregnancy test in past 12 months          "

## 2018-07-10 RX ORDER — SIMVASTATIN 20 MG
TABLET ORAL
Qty: 90 TABLET | Refills: 0 | Status: SHIPPED | OUTPATIENT
Start: 2018-07-10 | End: 2018-10-06

## 2018-07-10 NOTE — TELEPHONE ENCOUNTER
Routing refill request to provider for review/approval because:  Labs out of range:  Lipid  Kiah Conway RN   [facility] - Triage

## 2018-07-17 ENCOUNTER — OFFICE VISIT (OUTPATIENT)
Dept: FAMILY MEDICINE | Facility: CLINIC | Age: 63
End: 2018-07-17
Payer: COMMERCIAL

## 2018-07-17 VITALS
WEIGHT: 147 LBS | TEMPERATURE: 96.9 F | OXYGEN SATURATION: 100 % | DIASTOLIC BLOOD PRESSURE: 86 MMHG | HEIGHT: 63 IN | BODY MASS INDEX: 26.05 KG/M2 | HEART RATE: 65 BPM | SYSTOLIC BLOOD PRESSURE: 130 MMHG | RESPIRATION RATE: 12 BRPM

## 2018-07-17 DIAGNOSIS — M47.22 OSTEOARTHRITIS OF SPINE WITH RADICULOPATHY, CERVICAL REGION: ICD-10-CM

## 2018-07-17 DIAGNOSIS — M51.16 LUMBAR DISC DISEASE WITH RADICULOPATHY: ICD-10-CM

## 2018-07-17 DIAGNOSIS — M47.816 LUMBAR SPONDYLOSIS: Primary | ICD-10-CM

## 2018-07-17 PROCEDURE — 99213 OFFICE O/P EST LOW 20 MIN: CPT | Performed by: FAMILY MEDICINE

## 2018-07-17 NOTE — MR AVS SNAPSHOT
"              After Visit Summary   7/17/2018    Idalia Howe    MRN: [id number]           Patient Information     Date Of Birth          1955        Visit Information        Provider Department      7/17/2018 10:40 AM Akhil Almodovar MD [facility]        Today's Diagnoses     Lumbar spondylosis    -  1    Lumbar disc disease with radiculopathy        Osteoarthritis of spine with radiculopathy, cervical region           Follow-ups after your visit        Follow-up notes from your care team     Return in about 6 months (around 1/17/2019) for WC.      Who to contact     If you have questions or need follow up information about today's clinic visit or your schedule please contact [facility] directly at [phone].  Normal or non-critical lab and imaging results will be communicated to you by MyChart, letter or phone within 4 business days after the clinic has received the results. If you do not hear from us within 7 days, please contact the clinic through MyChart or phone. If you have a critical or abnormal lab result, we will notify you by phone as soon as possible.  Submit refill requests through Opax or call your pharmacy and they will forward the refill request to us. Please allow 3 business days for your refill to be completed.          Additional Information About Your Visit        MyChart Information     Opax lets you send messages to your doctor, view your test results, renew your prescriptions, schedule appointments and more. To sign up, go to [email or web address] . Click on \"Log in\" on the left side of the screen, which will take you to the Welcome page. Then click on \"Sign up Now\" on the right side of the page.     You will be asked to enter the access code listed below, as well as some personal information. Please follow the directions to create your username and password.     Your access code is: [email or web address]  Expires: 10/15/2018 10:33 AM     Your access " "code will  in 90 days. If you need help or a new code, please call your [place] clinic or [phone].        Care EveryWhere ID     This is your Care EveryWhere ID. This could be used by other organizations to access your [place] medical records  [id number]        Your Vitals Were     Pulse Temperature Respirations Height Pulse Oximetry BMI (Body Mass Index)    65 96.9  F (36.1  C) (Tympanic) 12 5' 3\" (1.6 m) 100% 26.04 kg/m2       Blood Pressure from Last 3 Encounters:   18 130/86   18 135/89   18 111/73    Weight from Last 3 Encounters:   18 147 lb (66.7 kg)   18 146 lb (66.2 kg)   18 146 lb (66.2 kg)              Today, you had the following     No orders found for display       Primary Care Provider Office Phone # Fax #    Akhil Almodovar [phone]       [address] [facility] [address]        Equal Access to Services     [facility]: Hadii avel santos hadnolan Sowiliam, waaxda luqadaha, qaybta kaalmachilo vogel, ludwin maciel . So [facility] [phone].    ATENCIÓN: Si habla español, tiene a lopez disposición servicios gratuitos de asistencia lingüística. [facility] [phone].    We comply with applicable federal civil rights laws and [place] laws. We do not discriminate on the basis of race, color, national origin, age, disability, sex, sexual orientation, or gender identity.            Thank you!     Thank you for choosing [facility]  for your care. Our goal is always to provide you with excellent care. Hearing back from our patients is one way we can continue to improve our services. Please take a few minutes to complete the written survey that you may receive in the mail after your visit with us. Thank you!             Your Updated Medication List - Protect others around you: Learn how to safely use, store and throw away your medicines at www.disposemymeds.org.          This list is accurate " as of 7/17/18 11:19 AM.  Always use your most recent med list.                   Brand Name Dispense Instructions for use Diagnosis    CALCIUM + D PO      Take by mouth daily        chlorthalidone 25 MG tablet    HYGROTON    45 tablet    TAKE 1/2 TABLET(12.5 MG) BY MOUTH DAILY    Benign essential hypertension, Hyperlipidemia LDL goal <160, Hypothyroidism, unspecified type       Levothyroxine Sodium 112 MCG Caps     90 capsule    Take 112 mcg by mouth daily    Benign essential hypertension, Hyperlipidemia LDL goal <160, Hypothyroidism, unspecified type       losartan 100 MG tablet    COZAAR    90 tablet    TAKE 1 TABLET(100 MG) BY MOUTH DAILY    Benign essential hypertension, Hyperlipidemia LDL goal <160, Hypothyroidism, unspecified type       montelukast 10 MG tablet    SINGULAIR    30 tablet    TAKE 1 TABLET(10 MG) BY MOUTH AT BEDTIME    Purulent postnasal drainage       PRESERVISION AREDS PO      Take by mouth daily        simvastatin 20 MG tablet    ZOCOR    90 tablet    TAKE 1 TABLET(20 MG) BY MOUTH AT BEDTIME    Benign essential hypertension, Hyperlipidemia LDL goal <160, Hypothyroidism, unspecified type

## 2018-07-17 NOTE — PROGRESS NOTES
SUBJECTIVE:   Idalia Howe is a 62 year old female who presents to clinic today for the following health issues:      Musculoskeletal problem/pain Follow Up        Description  Location: Pt states she is experiencing pain in right shoulder.       Problem list and histories reviewed & adjusted, as indicated.  Additional history: as documented    Patient Active Problem List   Diagnosis     Hyperlipidemia LDL goal <160     Lumbar spondylosis     Lumbar disc disease with radiculopathy     DJD (degenerative joint disease) of cervical spine     Malignant neoplasm of thyroid gland (H)     Nonspecific reaction to tuberculin test     Cyst of ovary     History of malignant neoplasm of thyroid     Postoperative hypothyroidism     Acute bronchitis     Chronic bronchitis (H)     Chronic rhinitis     No past surgical history on file.    Social History   Substance Use Topics     Smoking status: Never Smoker     Smokeless tobacco: Never Used     Alcohol use No     No family history on file.      Current Outpatient Prescriptions   Medication Sig Dispense Refill     Calcium Citrate-Vitamin D (CALCIUM + D PO) Take by mouth daily       chlorthalidone (HYGROTON) 25 MG tablet TAKE 1/2 TABLET(12.5 MG) BY MOUTH DAILY 45 tablet 1     Levothyroxine Sodium 112 MCG CAPS Take 112 mcg by mouth daily 90 capsule 1     losartan (COZAAR) 100 MG tablet TAKE 1 TABLET(100 MG) BY MOUTH DAILY 90 tablet 1     montelukast (SINGULAIR) 10 MG tablet TAKE 1 TABLET(10 MG) BY MOUTH AT BEDTIME 30 tablet 3     Multiple Vitamins-Minerals (PRESERVISION AREDS PO) Take by mouth daily       simvastatin (ZOCOR) 20 MG tablet TAKE 1 TABLET(20 MG) BY MOUTH AT BEDTIME 90 tablet 0     [DISCONTINUED] losartan (COZAAR) 100 MG tablet Take 1 tablet (100 mg) by mouth daily 90 tablet 1     No Known Allergies  Recent Labs   Lab Test  01/11/18   1405  01/23/17   1021 12/15/14 11/15/13   LDL   --   95  109  179*   HDL   --   66  57  56   TRIG   --   143  152  149   ALT  23  22  37   "22   CR  0.55  0.75  0.64  0.70   GFRESTIMATED  >90  78  >60  >60   GFRESTBLACK  >90  >90  African American GFR Calc    >60  >60   POTASSIUM  3.6  3.7  3.9  3.9   TSH  0.01*  0.02*  <0.015  8.115      BP Readings from Last 3 Encounters:   07/17/18 130/86   02/02/18 135/89   01/12/18 111/73    Wt Readings from Last 3 Encounters:   07/17/18 147 lb (66.7 kg)   02/02/18 146 lb (66.2 kg)   01/12/18 146 lb (66.2 kg)                    Reviewed and updated as needed this visit by clinical staff  Allergies       Reviewed and updated as needed this visit by Provider         ROS:  Constitutional, HEENT, cardiovascular, pulmonary, gi and gu systems are negative, except as otherwise noted.    OBJECTIVE:     /86 (Cuff Size: Adult Regular)  Pulse 65  Temp 96.9  F (36.1  C) (Tympanic)  Resp 12  Ht 5' 3\" (1.6 m)  Wt 147 lb (66.7 kg)  SpO2 100%  BMI 26.04 kg/m2  Body mass index is 26.04 kg/(m^2).  GENERAL: healthy, alert and no distress  NECK: no adenopathy, no asymmetry, masses, or scars and thyroid normal to palpation  RESP: lungs clear to auscultation - no rales, rhonchi or wheezes  CV: regular rate and rhythm, normal S1 S2, no S3 or S4, no murmur, click or rub, no peripheral edema and peripheral pulses strong  ABDOMEN: soft, nontender, no hepatosplenomegaly, no masses and bowel sounds normal  MS: worsening right shoulder and neck pain with decreased ROM on both, weak bilateral upper extremities strength(3/5), has mid back tenderness with decreased ROM, has SLRT positive bilateral, has no decreased strength and sensory on bilateral LE(3/5)        ASSESSMENT/PLAN:   Idalia was seen today for recheck.    Diagnoses and all orders for this visit:    Lumbar spondylosis    Lumbar disc disease with radiculopathy    Osteoarthritis of spine with radiculopathy, cervical region      Her general status hasn't been changing, has right upper extremities radiculopathy(mild to moderate), will keep her on current status of work " excuse for next 6 months and will recheck at the clinic  Plus we strongly recommend her to check EMG on UE. Pt will check on with WC and decide      FUTURE APPOINTMENTS:       - Follow-up visit in 6 months     Akhil Almodovar MD  [facility] [place]

## 2018-08-28 ENCOUNTER — ALLIED HEALTH/NURSE VISIT (OUTPATIENT)
Dept: NURSING | Facility: CLINIC | Age: 63
End: 2018-08-28
Payer: COMMERCIAL

## 2018-08-28 DIAGNOSIS — Z23 NEED FOR VACCINATION: Primary | ICD-10-CM

## 2018-08-28 PROCEDURE — 90471 IMMUNIZATION ADMIN: CPT

## 2018-08-28 PROCEDURE — 99207 ZZC NO CHARGE LOS: CPT

## 2018-08-28 PROCEDURE — 90750 HZV VACC RECOMBINANT IM: CPT

## 2018-08-28 PROCEDURE — 90732 PPSV23 VACC 2 YRS+ SUBQ/IM: CPT

## 2018-08-28 PROCEDURE — 90472 IMMUNIZATION ADMIN EACH ADD: CPT

## 2018-09-26 DIAGNOSIS — E78.5 HYPERLIPIDEMIA LDL GOAL <160: ICD-10-CM

## 2018-09-26 DIAGNOSIS — E03.9 HYPOTHYROIDISM, UNSPECIFIED TYPE: ICD-10-CM

## 2018-09-26 DIAGNOSIS — E89.0 POSTOPERATIVE HYPOTHYROIDISM: Primary | ICD-10-CM

## 2018-09-26 DIAGNOSIS — I10 BENIGN ESSENTIAL HYPERTENSION: ICD-10-CM

## 2018-09-26 RX ORDER — LEVOTHYROXINE SODIUM 112 UG/1
TABLET ORAL
Qty: 30 TABLET | Refills: 3 | Status: SHIPPED | OUTPATIENT
Start: 2018-09-26 | End: 2019-01-09

## 2018-09-26 RX ORDER — CHLORTHALIDONE 25 MG/1
TABLET ORAL
Qty: 45 TABLET | Refills: 0 | OUTPATIENT
Start: 2018-09-26

## 2018-09-26 NOTE — TELEPHONE ENCOUNTER
Routing refill request to provider for review/approval because:  Labs out of range:  TSH: advised ne dosing at last labs with 3 month follow up and no future labs ordered.  Rimma Swann RN - Triage  [facility]

## 2018-09-26 NOTE — TELEPHONE ENCOUNTER
"Requested Prescriptions   Pending Prescriptions Disp Refills     levothyroxine (SYNTHROID/LEVOTHROID) 112 MCG tablet [Pharmacy Med Name: LEVOTHYROXINE 0.112MG (112MCG) TABS] 90 tablet 0     Sig: TAKE 1 TABLET BY MOUTH EVERY DAY    Thyroid Protocol Failed    9/26/2018  8:54 AM       Failed - Normal TSH on file in past 12 months    Recent Labs   Lab Test  01/11/18   1405   TSH  0.01*      Last Written Prescription Date: 1/11/2018  Last Fill Quantity: 90,  # refills: 1  Last office visit: 7/17/2018 with prescribing provider:    Future Office Visit:           Passed - Patient is 12 years or older       Passed - Recent (12 mo) or future (30 days) visit within the authorizing provider's specialty    Patient had office visit in the last 12 months or has a visit in the next 30 days with authorizing provider or within the authorizing provider's specialty.  See \"Patient Info\" tab in inbasket, or \"Choose Columns\" in Meds & Orders section of the refill encounter.           Passed - No active pregnancy on record    If patient is pregnant or has had a positive pregnancy test, please check TSH.         Passed - No positive pregnancy test in past 12 months    If patient is pregnant or has had a positive pregnancy test, please check TSH.            "

## 2018-09-26 NOTE — TELEPHONE ENCOUNTER
"Requested Prescriptions   Pending Prescriptions Disp Refills     chlorthalidone (HYGROTON) 25 MG tablet [Pharmacy Med Name: CHLORTHALIDONE 25MG TABLETS]  Last Written Prescription Date:  7/2/18  Last Fill Quantity: 45,  # refills: 1   Last office visit: 7/17/2018 with prescribing provider:  Scooby   Future Office Visit:     45 tablet 0     Sig: TAKE 1/2 TABLET(12.5 MG) BY MOUTH DAILY    Diuretics (Including Combos) Protocol Passed    9/26/2018  3:11 AM       Passed - Blood pressure under 140/90 in past 12 months    BP Readings from Last 3 Encounters:   07/17/18 130/86   02/02/18 135/89   01/12/18 111/73                Passed - Recent (12 mo) or future (30 days) visit within the authorizing provider's specialty    Patient had office visit in the last 12 months or has a visit in the next 30 days with authorizing provider or within the authorizing provider's specialty.  See \"Patient Info\" tab in inbasket, or \"Choose Columns\" in Meds & Orders section of the refill encounter.           Passed - Patient is age 18 or older       Passed - No active pregancy on record       Passed - Normal serum creatinine on file in past 12 months    Recent Labs   Lab Test  01/11/18   1405   CR  0.55             Passed - Normal serum potassium on file in past 12 months    Recent Labs   Lab Test  01/11/18   1405   POTASSIUM  3.6                   Passed - Normal serum sodium on file in past 12 months    Recent Labs   Lab Test  01/11/18   1405   NA  140             Passed - No positive pregnancy test in past 12 months          "

## 2018-10-06 DIAGNOSIS — I10 BENIGN ESSENTIAL HYPERTENSION: ICD-10-CM

## 2018-10-06 DIAGNOSIS — E78.5 HYPERLIPIDEMIA LDL GOAL <160: ICD-10-CM

## 2018-10-06 DIAGNOSIS — E03.9 HYPOTHYROIDISM, UNSPECIFIED TYPE: ICD-10-CM

## 2018-10-08 RX ORDER — SIMVASTATIN 20 MG
TABLET ORAL
Qty: 90 TABLET | Refills: 0 | Status: SHIPPED | OUTPATIENT
Start: 2018-10-08 | End: 2019-01-05

## 2018-10-08 NOTE — TELEPHONE ENCOUNTER
"Requested Prescriptions   Pending Prescriptions Disp Refills     simvastatin (ZOCOR) 20 MG tablet [Pharmacy Med Name: SIMVASTATIN 20MG TABLETS]  Last Written Prescription Date:  7-  Last Fill Quantity: 90 tablet,  # refills: 0   Last office visit: 7/17/2018 with prescribing provider:     Future Office Visit:   Next 5 appointments (look out 90 days)     Oct 09, 2018  1:20 PM CDT   SHORT with Akhil Almodovar MD   [facility] ([address] [id number] [phone]                  90 tablet 0     Sig: TAKE 1 TABLET(20 MG) BY MOUTH AT BEDTIME    Statins Protocol Failed    10/6/2018  3:10 AM       Failed - LDL on file in past 12 months    Recent Labs   Lab Test  01/23/17   1021   LDL  95            Passed - No abnormal creatine kinase in past 12 months    No lab results found.            Passed - Recent (12 mo) or future (30 days) visit within the authorizing provider's specialty    Patient had office visit in the last 12 months or has a visit in the next 30 days with authorizing provider or within the authorizing provider's specialty.  See \"Patient Info\" tab in inbasket, or \"Choose Columns\" in Meds & Orders section of the refill encounter.           Passed - Patient is age 18 or older       Passed - No active pregnancy on record       Passed - No positive pregnancy test in past 12 months          "

## 2018-10-08 NOTE — TELEPHONE ENCOUNTER
Prescription approved per [facility] Refill Protocol.    Nell Reyes RN  EP Triage

## 2018-10-09 ENCOUNTER — OFFICE VISIT (OUTPATIENT)
Dept: FAMILY MEDICINE | Facility: CLINIC | Age: 63
End: 2018-10-09
Payer: COMMERCIAL

## 2018-10-09 VITALS
DIASTOLIC BLOOD PRESSURE: 83 MMHG | OXYGEN SATURATION: 97 % | TEMPERATURE: 97 F | HEIGHT: 63 IN | WEIGHT: 147 LBS | SYSTOLIC BLOOD PRESSURE: 132 MMHG | BODY MASS INDEX: 26.05 KG/M2 | RESPIRATION RATE: 16 BRPM | HEART RATE: 68 BPM

## 2018-10-09 DIAGNOSIS — R82.90 ABNORMAL FINDING IN URINE: ICD-10-CM

## 2018-10-09 DIAGNOSIS — I10 BENIGN ESSENTIAL HYPERTENSION: ICD-10-CM

## 2018-10-09 DIAGNOSIS — Z23 NEED FOR PROPHYLACTIC VACCINATION AND INOCULATION AGAINST INFLUENZA: ICD-10-CM

## 2018-10-09 DIAGNOSIS — E78.5 HYPERLIPIDEMIA LDL GOAL <160: ICD-10-CM

## 2018-10-09 DIAGNOSIS — R06.02 SOB (SHORTNESS OF BREATH): ICD-10-CM

## 2018-10-09 DIAGNOSIS — N39.0 URINARY TRACT INFECTION WITHOUT HEMATURIA, SITE UNSPECIFIED: ICD-10-CM

## 2018-10-09 DIAGNOSIS — E03.9 HYPOTHYROIDISM, UNSPECIFIED TYPE: ICD-10-CM

## 2018-10-09 DIAGNOSIS — J20.9 ACUTE BRONCHITIS WITH SYMPTOMS > 10 DAYS: ICD-10-CM

## 2018-10-09 DIAGNOSIS — R30.0 DYSURIA: Primary | ICD-10-CM

## 2018-10-09 LAB
ALBUMIN UR-MCNC: NEGATIVE MG/DL
APPEARANCE UR: CLEAR
BACTERIA #/AREA URNS HPF: ABNORMAL /HPF
BILIRUB UR QL STRIP: NEGATIVE
COLOR UR AUTO: YELLOW
GLUCOSE UR STRIP-MCNC: NEGATIVE MG/DL
HGB UR QL STRIP: NEGATIVE
KETONES UR STRIP-MCNC: NEGATIVE MG/DL
LEUKOCYTE ESTERASE UR QL STRIP: ABNORMAL
NITRATE UR QL: POSITIVE
NON-SQ EPI CELLS #/AREA URNS LPF: ABNORMAL /LPF
PH UR STRIP: 7 PH (ref 5–7)
RBC #/AREA URNS AUTO: ABNORMAL /HPF
SOURCE: ABNORMAL
SP GR UR STRIP: 1.01 (ref 1–1.03)
UROBILINOGEN UR STRIP-ACNC: 0.2 EU/DL (ref 0.2–1)
WBC #/AREA URNS AUTO: ABNORMAL /HPF

## 2018-10-09 PROCEDURE — 99214 OFFICE O/P EST MOD 30 MIN: CPT | Mod: 25 | Performed by: FAMILY MEDICINE

## 2018-10-09 PROCEDURE — 90686 IIV4 VACC NO PRSV 0.5 ML IM: CPT | Performed by: FAMILY MEDICINE

## 2018-10-09 PROCEDURE — 87186 SC STD MICRODIL/AGAR DIL: CPT | Performed by: FAMILY MEDICINE

## 2018-10-09 PROCEDURE — 87086 URINE CULTURE/COLONY COUNT: CPT | Performed by: FAMILY MEDICINE

## 2018-10-09 PROCEDURE — 90471 IMMUNIZATION ADMIN: CPT | Performed by: FAMILY MEDICINE

## 2018-10-09 PROCEDURE — 81001 URINALYSIS AUTO W/SCOPE: CPT | Performed by: FAMILY MEDICINE

## 2018-10-09 PROCEDURE — 87088 URINE BACTERIA CULTURE: CPT | Performed by: FAMILY MEDICINE

## 2018-10-09 RX ORDER — CHLORTHALIDONE 25 MG/1
TABLET ORAL
Qty: 45 TABLET | Refills: 1 | Status: SHIPPED | OUTPATIENT
Start: 2018-10-09 | End: 2019-01-09

## 2018-10-09 RX ORDER — SULFAMETHOXAZOLE/TRIMETHOPRIM 800-160 MG
1 TABLET ORAL 2 TIMES DAILY
Qty: 14 TABLET | Refills: 0 | Status: SHIPPED | OUTPATIENT
Start: 2018-10-09 | End: 2019-01-09

## 2018-10-09 RX ORDER — AZITHROMYCIN 250 MG/1
TABLET, FILM COATED ORAL
Qty: 6 TABLET | Refills: 0 | Status: CANCELLED | OUTPATIENT
Start: 2018-10-09

## 2018-10-09 RX ORDER — CODEINE PHOSPHATE AND GUAIFENESIN 10; 100 MG/5ML; MG/5ML
1 SOLUTION ORAL EVERY 4 HOURS PRN
Qty: 240 ML | Refills: 0 | Status: SHIPPED | OUTPATIENT
Start: 2018-10-09 | End: 2019-01-09

## 2018-10-09 NOTE — PROGRESS NOTES

## 2018-10-09 NOTE — MR AVS SNAPSHOT
"              After Visit Summary   10/9/2018    Idalia Howe    MRN: [id number]           Patient Information     Date Of Birth          1955        Visit Information        Provider Department      10/9/2018 1:20 PM Akhil Almodovar MD [facility] [place]        Today's Diagnoses     Dysuria    -  1    Benign essential hypertension        Hyperlipidemia LDL goal <160        Hypothyroidism, unspecified type        Abnormal finding in urine        Urinary tract infection without hematuria, site unspecified        Acute bronchitis with symptoms > 10 days        SOB (shortness of breath)        Need for prophylactic vaccination and inoculation against influenza           Follow-ups after your visit        Follow-up notes from your care team     Return in about 2 months (around 12/9/2018).      Who to contact     If you have questions or need follow up information about today's clinic visit or your schedule please contact [facility] [place] directly at [phone].  Normal or non-critical lab and imaging results will be communicated to you by MyChart, letter or phone within 4 business days after the clinic has received the results. If you do not hear from us within 7 days, please contact the clinic through MyChart or phone. If you have a critical or abnormal lab result, we will notify you by phone as soon as possible.  Submit refill requests through [employer] or call your pharmacy and they will forward the refill request to us. Please allow 3 business days for your refill to be completed.          Additional Information About Your Visit        MyChart Information     [employer] lets you send messages to your doctor, view your test results, renew your prescriptions, schedule appointments and more. To sign up, go to [email or web address] . Click on \"Log in\" on the left side of the screen, which will take you to the Welcome page. Then click on \"Sign up Now\" on the right side of the page.     You will " "be asked to enter the access code listed below, as well as some personal information. Please follow the directions to create your username and password.     Your access code is: [email or web address]  Expires: 10/15/2018 10:33 AM     Your access code will  in 90 days. If you need help or a new code, please call your [place] clinic or [phone].        Care EveryWhere ID     This is your Care EveryWhere ID. This could be used by other organizations to access your [facility] [place] medical records  [id number]        Your Vitals Were     Pulse Temperature Respirations Height Pulse Oximetry BMI (Body Mass Index)    68 97  F (36.1  C) (Tympanic) 16 5' 3\" (1.6 m) 97% 26.04 kg/m2       Blood Pressure from Last 3 Encounters:   10/09/18 132/83   18 130/86   18 135/89    Weight from Last 3 Encounters:   10/09/18 147 lb (66.7 kg)   18 147 lb (66.7 kg)   18 146 lb (66.2 kg)              We Performed the Following     FLU VACCINE, SPLIT VIRUS, IM (QUADRIVALENT) [14741]- >3 YRS     UA reflex to Microscopic and Culture     Urine Culture Aerobic Bacterial     Urine Microscopic     Vaccine Administration, Initial [55706]          Today's Medication Changes          These changes are accurate as of 10/9/18  2:20 PM.  If you have any questions, ask your nurse or doctor.               Start taking these medicines.        Dose/Directions    albuterol 108 (90 Base) MCG/ACT Aepb inhaler   Commonly known as:  PROAIR RESPICLICK   Used for:  SOB (shortness of breath)   Started by:  Akhil Almodovar MD        Dose:  1 puff   Inhale 1 puff into the lungs every 6 hours as needed for shortness of breath / dyspnea or wheezing   Quantity:  1 Inhaler   Refills:  1       guaiFENesin-codeine 100-10 MG/5ML Soln solution   Commonly known as:  ROBITUSSIN AC   Used for:  Acute bronchitis with symptoms > 10 days   Started by:  Akhil Almodovar MD        Dose:  1 tsp.   Take 5 mLs by mouth every 4 hours as needed   Quantity:  240 mL   Refills:  0 "       sulfamethoxazole-trimethoprim 800-160 MG per tablet   Commonly known as:  BACTRIM DS/SEPTRA DS   Used for:  Urinary tract infection without hematuria, site unspecified   Started by:  Akhil Almodovar MD        Dose:  1 tablet   Take 1 tablet by mouth 2 times daily   Quantity:  14 tablet   Refills:  0            Where to get your medicines      These medications were sent to [facility] [address] [place] [address] AT [facility] [address] [facility]  [address] [facility] [address]     Phone:  [phone]     albuterol 108 (90 Base) MCG/ACT Aepb inhaler    chlorthalidone 25 MG tablet    sulfamethoxazole-trimethoprim 800-160 MG per tablet         Some of these will need a paper prescription and others can be bought over the counter.  Ask your nurse if you have questions.     Bring a paper prescription for each of these medications     guaiFENesin-codeine 100-10 MG/5ML Soln solution                Primary Care Provider Office Phone # Fax #    Akhil Almodovar [phone]       [address] [place] [address]        Equal Access to Services     [facility] AH: Hadii aad ku hadasho Soomaali, waaxda luqadaha, qaybta kaalmada adeegyada, ludwin mckeonin hayjeremiahn katarina maciel . So [facility] [phone].    ATENCIÓN: Si habla español, tiene a lopez disposición servicios gratuitos de asistencia lingüística. Llame al [phone].    We comply with applicable federal civil rights laws and [place] laws. We do not discriminate on the basis of race, color, national origin, age, disability, sex, sexual orientation, or gender identity.            Thank you!     Thank you for choosing [facility]  for your care. Our goal is always to provide you with excellent care. Hearing back from our patients is one way we can continue to improve our services. Please take a few minutes to complete the written survey that you may receive in the mail after your visit with  us. Thank you!             Your Updated Medication List - Protect others around you: Learn how to safely use, store and throw away your medicines at www.disposemymeds.org.          This list is accurate as of 10/9/18  2:20 PM.  Always use your most recent med list.                   Brand Name Dispense Instructions for use Diagnosis    albuterol 108 (90 Base) MCG/ACT Aepb inhaler    PROAIR RESPICLICK    1 Inhaler    Inhale 1 puff into the lungs every 6 hours as needed for shortness of breath / dyspnea or wheezing    SOB (shortness of breath)       CALCIUM + D PO      Take by mouth daily        chlorthalidone 25 MG tablet    HYGROTON    45 tablet    TAKE 1/2 TABLET(12.5 MG) BY MOUTH DAILY    Benign essential hypertension, Hyperlipidemia LDL goal <160, Hypothyroidism, unspecified type       guaiFENesin-codeine 100-10 MG/5ML Soln solution    ROBITUSSIN AC    240 mL    Take 5 mLs by mouth every 4 hours as needed    Acute bronchitis with symptoms > 10 days       levothyroxine 112 MCG tablet    SYNTHROID/LEVOTHROID    30 tablet    TAKE 1 TABLET BY MOUTH EVERY DAY    Postoperative hypothyroidism       losartan 100 MG tablet    COZAAR    90 tablet    TAKE 1 TABLET(100 MG) BY MOUTH DAILY    Benign essential hypertension, Hyperlipidemia LDL goal <160, Hypothyroidism, unspecified type       montelukast 10 MG tablet    SINGULAIR    30 tablet    TAKE 1 TABLET(10 MG) BY MOUTH AT BEDTIME    Purulent postnasal drainage       PRESERVISION AREDS PO      Take by mouth daily        simvastatin 20 MG tablet    ZOCOR    90 tablet    TAKE 1 TABLET(20 MG) BY MOUTH AT BEDTIME    Benign essential hypertension, Hyperlipidemia LDL goal <160, Hypothyroidism, unspecified type       sulfamethoxazole-trimethoprim 800-160 MG per tablet    BACTRIM DS/SEPTRA DS    14 tablet    Take 1 tablet by mouth 2 times daily    Urinary tract infection without hematuria, site unspecified

## 2018-10-09 NOTE — PROGRESS NOTES
SUBJECTIVE:   Idalia Howe is a 62 year old female who presents to clinic today for the following health issues:      RESPIRATORY SYMPTOMS      Duration: 1.5 months     Description  nasal congestion and cough    Severity: moderate    Accompanying signs and symptoms: None    History (predisposing factors):  none    Precipitating or alleviating factors: None    Therapies tried and outcome:  Advil, Tylenol, cough syrup, Tylenol #3- helped       URINARY TRACT SYMPTOMS      Duration: 5-6 months     Description  frequency    Intensity:  moderate    Accompanying signs and symptoms:  Fever/chills: no   Flank pain YES  Nausea and vomiting: no   Vaginal symptoms: none  Abdominal/Pelvic Pain: no     History  History of frequent UTI's: YES  History of kidney stones: no   Sexually Active: no   Possibility of pregnancy: No    Precipitating or alleviating factors: None    Therapies tried and outcome: cranberry juice   Outcome: helped       Problem list and histories reviewed & adjusted, as indicated.  Additional history: as documented    Patient Active Problem List   Diagnosis     Hyperlipidemia LDL goal <160     Lumbar spondylosis     Lumbar disc disease with radiculopathy     DJD (degenerative joint disease) of cervical spine     Malignant neoplasm of thyroid gland (H)     Nonspecific reaction to tuberculin test     Cyst of ovary     History of malignant neoplasm of thyroid     Postoperative hypothyroidism     Acute bronchitis     Chronic bronchitis (H)     Chronic rhinitis     No past surgical history on file.    Social History   Substance Use Topics     Smoking status: Never Smoker     Smokeless tobacco: Never Used     Alcohol use No     No family history on file.      Current Outpatient Prescriptions   Medication Sig Dispense Refill     albuterol (PROAIR RESPICLICK) 108 (90 Base) MCG/ACT AEPB inhaler Inhale 1 puff into the lungs every 6 hours as needed for shortness of breath / dyspnea or wheezing 1 Inhaler 1     Calcium  "Citrate-Vitamin D (CALCIUM + D PO) Take by mouth daily       chlorthalidone (HYGROTON) 25 MG tablet TAKE 1/2 TABLET(12.5 MG) BY MOUTH DAILY 45 tablet 1     levothyroxine (SYNTHROID/LEVOTHROID) 112 MCG tablet TAKE 1 TABLET BY MOUTH EVERY DAY 30 tablet 3     losartan (COZAAR) 100 MG tablet TAKE 1 TABLET(100 MG) BY MOUTH DAILY 90 tablet 1     montelukast (SINGULAIR) 10 MG tablet TAKE 1 TABLET(10 MG) BY MOUTH AT BEDTIME 30 tablet 3     Multiple Vitamins-Minerals (PRESERVISION AREDS PO) Take by mouth daily       simvastatin (ZOCOR) 20 MG tablet TAKE 1 TABLET(20 MG) BY MOUTH AT BEDTIME 90 tablet 0     sulfamethoxazole-trimethoprim (BACTRIM DS/SEPTRA DS) 800-160 MG per tablet Take 1 tablet by mouth 2 times daily 14 tablet 0     [DISCONTINUED] chlorthalidone (HYGROTON) 25 MG tablet TAKE 1/2 TABLET(12.5 MG) BY MOUTH DAILY 45 tablet 1     [DISCONTINUED] Levothyroxine Sodium 112 MCG CAPS Take 112 mcg by mouth daily 90 capsule 1     No Known Allergies  Recent Labs   Lab Test  01/11/18   1405  01/23/17   1021 12/15/14 11/15/13   LDL   --   95  109  179*   HDL   --   66  57  56   TRIG   --   143  152  149   ALT  23  22  37  22   CR  0.55  0.75  0.64  0.70   GFRESTIMATED  >90  78  >60  >60   GFRESTBLACK  >90  >90  African American GFR Calc    >60  >60   POTASSIUM  3.6  3.7  3.9  3.9   TSH  0.01*  0.02*  <0.015  8.115      BP Readings from Last 3 Encounters:   10/09/18 132/83   07/17/18 130/86   02/02/18 135/89    Wt Readings from Last 3 Encounters:   10/09/18 147 lb (66.7 kg)   07/17/18 147 lb (66.7 kg)   02/02/18 146 lb (66.2 kg)            Reviewed and updated as needed this visit by clinical staff       Reviewed and updated as needed this visit by Provider         ROS:  Constitutional, HEENT, cardiovascular, pulmonary, gi and gu systems are negative, except as otherwise noted.    OBJECTIVE:     /83 (Cuff Size: Adult Regular)  Pulse 68  Temp 97  F (36.1  C) (Tympanic)  Resp 16  Ht 5' 3\" (1.6 m)  Wt 147 lb (66.7 kg)  " SpO2 97%  BMI 26.04 kg/m2  Body mass index is 26.04 kg/(m^2).  GENERAL: healthy, alert and no distress  NECK: no adenopathy, no asymmetry, masses, or scars and thyroid normal to palpation  RESP: lungs clear to auscultation - no rales, rhonchi or wheezes  CV: regular rate and rhythm, normal S1 S2, no S3 or S4, no murmur, click or rub, no peripheral edema and peripheral pulses strong  ABDOMEN: soft, nontender, no hepatosplenomegaly, no masses and bowel sounds normal  MS: no gross musculoskeletal defects noted, no edema        ASSESSMENT/PLAN:   ASSESSMENT / PLAN:  (R30.0) Dysuria  (primary encounter diagnosis)  Comment: has sign of UTI, will have her to try abx   Plan: UA reflex to Microscopic and Culture, Urine         Microscopic            (I10) Benign essential hypertension  Comment: stable with current dose of meds, has no side effect from the meds, will keep watching sx  Plan: chlorthalidone (HYGROTON) 25 MG tablet            (E78.5) Hyperlipidemia LDL goal <160  Comment: stable   Plan: chlorthalidone (HYGROTON) 25 MG tablet            (R82.90) Abnormal finding in urine  Comment: mentioned above   Plan: Urine Culture Aerobic Bacterial            (N39.0) Urinary tract infection without hematuria, site unspecified  Comment: mentioned above   Plan: sulfamethoxazole-trimethoprim (BACTRIM         DS/SEPTRA DS) 800-160 MG per tablet            (J20.9) Acute bronchitis with symptoms > 10 days  Comment: will have her to take bactrim and cough syrup  Plan: mentioned above     (R06.02) SOB (shortness of breath)  Comment: related with bronchitis, will have her to try albuterol   Plan: albuterol (PROAIR RESPICLICK) 108 (90 Base)         MCG/ACT AEPB inhaler                FUTURE APPOINTMENTS:       - Follow-up visit in 2 months for CPE and fasting lab, f/u cough and considering referral to pulmonology     Akhil Almodovar MD  [facility]

## 2018-10-11 LAB
BACTERIA SPEC CULT: ABNORMAL
SPECIMEN SOURCE: ABNORMAL

## 2018-12-22 DIAGNOSIS — E03.9 HYPOTHYROIDISM, UNSPECIFIED TYPE: ICD-10-CM

## 2018-12-22 DIAGNOSIS — E78.5 HYPERLIPIDEMIA LDL GOAL <160: ICD-10-CM

## 2018-12-22 DIAGNOSIS — I10 BENIGN ESSENTIAL HYPERTENSION: ICD-10-CM

## 2018-12-24 RX ORDER — LOSARTAN POTASSIUM 100 MG/1
TABLET ORAL
Qty: 90 TABLET | Refills: 0 | Status: SHIPPED | OUTPATIENT
Start: 2018-12-24 | End: 2019-01-09

## 2018-12-24 NOTE — TELEPHONE ENCOUNTER
Refill approved through [facility] protocol.  Halima Lewis RN  [facility]  [phone]

## 2018-12-24 NOTE — TELEPHONE ENCOUNTER
"Requested Prescriptions   Pending Prescriptions Disp Refills     losartan (COZAAR) 100 MG tablet [Pharmacy Med Name: LOSARTAN 100MG TABLETS] 90 tablet 0     Sig: TAKE 1 TABLET(100 MG) BY MOUTH DAILY    Angiotensin-II Receptors Passed - 12/22/2018 10:30 AM       Passed - Blood pressure under 140/90 in past 12 months    BP Readings from Last 3 Encounters:   10/09/18 132/83   07/17/18 130/86   02/02/18 135/89                Passed - Recent (12 mo) or future (30 days) visit within the authorizing provider's specialty    Patient had office visit in the last 12 months or has a visit in the next 30 days with authorizing provider or within the authorizing provider's specialty.  See \"Patient Info\" tab in inbasket, or \"Choose Columns\" in Meds & Orders section of the refill encounter.             Passed - Patient is age 18 or older       Passed - No active pregnancy on record       Passed - Normal serum creatinine on file in past 12 months    Recent Labs   Lab Test 01/11/18  1405   CR 0.55            Passed - Normal serum potassium on file in past 12 months    Recent Labs   Lab Test 01/11/18  1405   POTASSIUM 3.6                   Passed - No positive pregnancy test in past 12 months        losartan (COZAAR) 100 MG tablet 90 tablet 1 6/26/2018        Last Written Prescription Date:  06/26/2018  Last Fill Quantity: 90,  # refills: 1   Last office visit: 10/9/2018 with prescribing provider:  Dr. Almodovar   Future Office Visit:  Unknown     "

## 2019-01-05 DIAGNOSIS — E03.9 HYPOTHYROIDISM, UNSPECIFIED TYPE: ICD-10-CM

## 2019-01-05 DIAGNOSIS — E78.5 HYPERLIPIDEMIA LDL GOAL <160: ICD-10-CM

## 2019-01-05 DIAGNOSIS — I10 BENIGN ESSENTIAL HYPERTENSION: ICD-10-CM

## 2019-01-07 RX ORDER — SIMVASTATIN 20 MG
TABLET ORAL
Qty: 30 TABLET | Refills: 0 | Status: SHIPPED | OUTPATIENT
Start: 2019-01-07 | End: 2019-01-09

## 2019-01-07 NOTE — TELEPHONE ENCOUNTER
"Requested Prescriptions   Pending Prescriptions Disp Refills     simvastatin (ZOCOR) 20 MG tablet [Pharmacy Med Name: SIMVASTATIN 20MG TABLETS]  Last Written Prescription Date:  10/8/18  Last Fill Quantity: 90,  # refills: 0   Last office visit: 10/9/2018 with prescribing provider:  Scooby   Future Office Visit:   Next 5 appointments (look out 90 days)    Jan 09, 2019 10:00 AM CST  PHYSICAL with Akhil Almodovar MD  [facility] ([address] [id number] [phone]          90 tablet 0     Sig: TAKE 1 TABLET(20 MG) BY MOUTH AT BEDTIME    Statins Protocol Failed - 1/5/2019  3:11 AM       Failed - LDL on file in past 12 months    Recent Labs   Lab Test 01/23/17  1021   LDL 95            Passed - No abnormal creatine kinase in past 12 months    No lab results found.            Passed - Recent (12 mo) or future (30 days) visit within the authorizing provider's specialty    Patient had office visit in the last 12 months or has a visit in the next 30 days with authorizing provider or within the authorizing provider's specialty.  See \"Patient Info\" tab in inbasket, or \"Choose Columns\" in Meds & Orders section of the refill encounter.             Passed - Medication is active on med list       Passed - Patient is age 18 or older       Passed - No active pregnancy on record       Passed - No positive pregnancy test in past 12 months          "

## 2019-01-09 ENCOUNTER — OFFICE VISIT (OUTPATIENT)
Dept: FAMILY MEDICINE | Facility: CLINIC | Age: 64
End: 2019-01-09
Payer: COMMERCIAL

## 2019-01-09 VITALS
HEART RATE: 69 BPM | BODY MASS INDEX: 26.22 KG/M2 | RESPIRATION RATE: 12 BRPM | WEIGHT: 148 LBS | TEMPERATURE: 96.7 F | SYSTOLIC BLOOD PRESSURE: 142 MMHG | DIASTOLIC BLOOD PRESSURE: 90 MMHG | HEIGHT: 63 IN | OXYGEN SATURATION: 99 %

## 2019-01-09 DIAGNOSIS — R91.8 PULMONARY NODULES: ICD-10-CM

## 2019-01-09 DIAGNOSIS — E78.5 HYPERLIPIDEMIA LDL GOAL <160: ICD-10-CM

## 2019-01-09 DIAGNOSIS — I10 BENIGN ESSENTIAL HYPERTENSION: ICD-10-CM

## 2019-01-09 DIAGNOSIS — E03.9 HYPOTHYROIDISM, UNSPECIFIED TYPE: ICD-10-CM

## 2019-01-09 DIAGNOSIS — Z00.00 HEALTHCARE MAINTENANCE: Primary | ICD-10-CM

## 2019-01-09 DIAGNOSIS — N39.45 URINARY INCONTINENCE WITH CONTINUOUS LEAKAGE: ICD-10-CM

## 2019-01-09 DIAGNOSIS — H53.9 VISION CHANGES: ICD-10-CM

## 2019-01-09 DIAGNOSIS — Z23 NEED FOR VACCINATION: ICD-10-CM

## 2019-01-09 DIAGNOSIS — E89.0 POSTOPERATIVE HYPOTHYROIDISM: ICD-10-CM

## 2019-01-09 DIAGNOSIS — J32.9 PURULENT POSTNASAL DRAINAGE: ICD-10-CM

## 2019-01-09 DIAGNOSIS — G47.33 OSA (OBSTRUCTIVE SLEEP APNEA): ICD-10-CM

## 2019-01-09 LAB
ERYTHROCYTE [DISTWIDTH] IN BLOOD BY AUTOMATED COUNT: 12.8 % (ref 10–15)
HCT VFR BLD AUTO: 43.4 % (ref 35–47)
HGB BLD-MCNC: 14.5 G/DL (ref 11.7–15.7)
MCH RBC QN AUTO: 29.1 PG (ref 26.5–33)
MCHC RBC AUTO-ENTMCNC: 33.4 G/DL (ref 31.5–36.5)
MCV RBC AUTO: 87 FL (ref 78–100)
PLATELET # BLD AUTO: 399 10E9/L (ref 150–450)
RBC # BLD AUTO: 4.98 10E12/L (ref 3.8–5.2)
WBC # BLD AUTO: 8.8 10E9/L (ref 4–11)

## 2019-01-09 PROCEDURE — 80053 COMPREHEN METABOLIC PANEL: CPT | Performed by: FAMILY MEDICINE

## 2019-01-09 PROCEDURE — 84443 ASSAY THYROID STIM HORMONE: CPT | Performed by: FAMILY MEDICINE

## 2019-01-09 PROCEDURE — 90471 IMMUNIZATION ADMIN: CPT | Performed by: FAMILY MEDICINE

## 2019-01-09 PROCEDURE — 85027 COMPLETE CBC AUTOMATED: CPT | Performed by: FAMILY MEDICINE

## 2019-01-09 PROCEDURE — G0402 INITIAL PREVENTIVE EXAM: HCPCS | Performed by: FAMILY MEDICINE

## 2019-01-09 PROCEDURE — 84439 ASSAY OF FREE THYROXINE: CPT | Performed by: FAMILY MEDICINE

## 2019-01-09 PROCEDURE — 80061 LIPID PANEL: CPT | Performed by: FAMILY MEDICINE

## 2019-01-09 PROCEDURE — 90750 HZV VACC RECOMBINANT IM: CPT | Performed by: FAMILY MEDICINE

## 2019-01-09 PROCEDURE — 36415 COLL VENOUS BLD VENIPUNCTURE: CPT | Performed by: FAMILY MEDICINE

## 2019-01-09 RX ORDER — LEVOTHYROXINE SODIUM 112 UG/1
112 TABLET ORAL DAILY
Qty: 90 TABLET | Refills: 1 | Status: SHIPPED | OUTPATIENT
Start: 2019-01-09 | End: 2019-07-22

## 2019-01-09 RX ORDER — CHLORTHALIDONE 25 MG/1
TABLET ORAL
Qty: 45 TABLET | Refills: 1 | Status: SHIPPED | OUTPATIENT
Start: 2019-01-09 | End: 2019-07-22

## 2019-01-09 RX ORDER — SIMVASTATIN 20 MG
20 TABLET ORAL AT BEDTIME
Qty: 90 TABLET | Refills: 1 | Status: SHIPPED | OUTPATIENT
Start: 2019-01-09 | End: 2019-07-22

## 2019-01-09 RX ORDER — MONTELUKAST SODIUM 10 MG/1
TABLET ORAL
Qty: 30 TABLET | Refills: 3 | Status: SHIPPED | OUTPATIENT
Start: 2019-01-09 | End: 2019-05-20

## 2019-01-09 RX ORDER — LOSARTAN POTASSIUM 100 MG/1
100 TABLET ORAL DAILY
Qty: 90 TABLET | Refills: 1 | Status: SHIPPED | OUTPATIENT
Start: 2019-01-09 | End: 2019-07-22

## 2019-01-09 ASSESSMENT — MIFFLIN-ST. JEOR: SCORE: 1185.45

## 2019-01-09 NOTE — LETTER
January 10, 2019      Idalia Howe  [address] [place] [address]        Dear ,    We are writing to inform you of your test results.    Your lab results including complete blood cell counts/electrolyte balance/liver and kidney function/thyroid function were stable at your baseline without clinical deterioration.  However, your glucose and cholesterol indexes are worsened compared to last year. Please keep taking current dose of medicine, and keep working on life style modification including low fat/carb diet with regular exercise.    Resulted Orders   CBC with platelets   Result Value Ref Range    WBC 8.8 4.0 - 11.0 10e9/L    RBC Count 4.98 3.8 - 5.2 10e12/L    Hemoglobin 14.5 11.7 - 15.7 g/dL    Hematocrit 43.4 35.0 - 47.0 %    MCV 87 78 - 100 fl    MCH 29.1 26.5 - 33.0 pg    MCHC 33.4 31.5 - 36.5 g/dL    RDW 12.8 10.0 - 15.0 %    Platelet Count 399 150 - 450 10e9/L   Comprehensive metabolic panel   Result Value Ref Range    Sodium 137 133 - 144 mmol/L    Potassium 3.5 3.4 - 5.3 mmol/L    Chloride 104 94 - 109 mmol/L    Carbon Dioxide 23 20 - 32 mmol/L    Anion Gap 10 3 - 14 mmol/L    Glucose 112 (H) 70 - 99 mg/dL      Comment:      Fasting specimen    Urea Nitrogen 13 7 - 30 mg/dL    Creatinine 0.68 0.52 - 1.04 mg/dL    GFR Estimate >90 >60 mL/min/[1.73_m2]      Comment:      Non  GFR Calc  Starting 12/18/2018, serum creatinine based estimated GFR (eGFR) will be   calculated using the Chronic Kidney Disease Epidemiology Collaboration   (CKD-EPI) equation.      GFR Estimate If Black >90 >60 mL/min/[1.73_m2]      Comment:       GFR Calc  Starting 12/18/2018, serum creatinine based estimated GFR (eGFR) will be   calculated using the Chronic Kidney Disease Epidemiology Collaboration   (CKD-EPI) equation.      Calcium 9.2 8.5 - 10.1 mg/dL    Bilirubin Total 0.5 0.2 - 1.3 mg/dL    Albumin 3.8 3.4 - 5.0 g/dL    Protein Total 7.6 6.8 - 8.8 g/dL    Alkaline Phosphatase 61 40 -  150 U/L    ALT 36 0 - 50 U/L    AST 17 0 - 45 U/L   Lipid panel reflex to direct LDL Fasting   Result Value Ref Range    Cholesterol 215 (H) <200 mg/dL      Comment:      Desirable:       <200 mg/dl    Triglycerides 214 (H) <150 mg/dL      Comment:      Borderline high:  150-199 mg/dl  High:             200-499 mg/dl  Very high:       >499 mg/dl  Fasting specimen      HDL Cholesterol 65 >49 mg/dL    LDL Cholesterol Calculated 107 (H) <100 mg/dL      Comment:      Above desirable:  100-129 mg/dl  Borderline High:  130-159 mg/dL  High:             160-189 mg/dL  Very high:       >189 mg/dl      Non HDL Cholesterol 150 (H) <130 mg/dL      Comment:      Above Desirable:  130-159 mg/dl  Borderline high:  160-189 mg/dl  High:             190-219 mg/dl  Very high:       >219 mg/dl     TSH with free T4 reflex   Result Value Ref Range    TSH 0.01 (L) 0.40 - 4.00 mU/L   T4 free   Result Value Ref Range    T4 Free 1.43 0.76 - 1.46 ng/dL       If you have any questions or concerns, please call the clinic at the number listed above.       Sincerely,        Akhil Almodovar MD

## 2019-01-09 NOTE — PATIENT INSTRUCTIONS
Preventive Health Recommendations    See your health care provider every year to    Review health changes.     Discuss preventive care.      Review your medicines if your doctor has prescribed any.      You no longer need a yearly Pap test unless you've had an abnormal Pap test in the past 10 years. If you have vaginal symptoms, such as bleeding or discharge, be sure to talk with your provider about a Pap test.      Every 1 to 2 years, have a mammogram.  If you are over 69, talk with your health care provider about whether or not you want to continue having screening mammograms.      Every 10 years, have a colonoscopy. Or, have a yearly FIT test (stool test). These exams will check for colon cancer.       Have a cholesterol test every 5 years, or more often if your doctor advises it.       Have a diabetes test (fasting glucose) every three years. If you are at risk for diabetes, you should have this test more often.       At age 65, have a bone density scan (DEXA) to check for osteoporosis (brittle bone disease).    Shots:    Get a flu shot each year.    Get a tetanus shot every 10 years.    Talk to your doctor about your pneumonia vaccines. There are now two you should receive - Pneumovax (PPSV 23) and Prevnar (PCV 13).    Talk to your pharmacist about the shingles vaccine.    Talk to your doctor about the hepatitis B vaccine.    Nutrition:     Eat at least 5 servings of fruits and vegetables each day.      Eat whole-grain bread, whole-wheat pasta and brown rice instead of white grains and rice.      Get adequate Calcium and Vitamin D.     Lifestyle    Exercise at least 150 minutes a week (30 minutes a day, 5 days a week). This will help you control your weight and prevent disease.      Limit alcohol to one drink per day.      No smoking.       Wear sunscreen to prevent skin cancer.       See your dentist twice a year for an exam and cleaning.      See your eye doctor every 1 to 2 years to screen for conditions  such as glaucoma, macular degeneration and cataracts.    Personalized Prevention Plan  You are due for the preventive services outlined below.  Your care team is available to assist you in scheduling these services.  If you have already completed any of these items, please share that information with your care team to update in your medical record.  Health Maintenance Due   Topic Date Due     Breathing Capacity Test  1955     COPD ACTION PLAN Q1 YR  01/27/1954     HIV SCREEN (SYSTEM ASSIGNED)  12/27/1971     Hepatitis C Screening  12/27/1971     Pap Smear - every 3 years  06/06/2015     Zoster (Chicken Pox) Vaccine (2 of 2) 10/23/2018     FALL RISK ASSESSMENT  12/27/2018     Bone Density Screening (Dexa)  12/27/2018     Mammogram - every 2 years  01/31/2019

## 2019-01-09 NOTE — PROGRESS NOTES
"  SUBJECTIVE:   Idalia Howe is a 63 year old female who presents for Preventive Visit.    click delete button to remove this line now  Are you in the first 12 months of your Medicare Part B coverage?  Yes,  Visual Acuity:  Right Eye: 20/40   Left Eye: 20/70  Both Eyes: 20/40    Physical Health:    In general, how would you rate your overall physical health? good    Outside of work, how many days during the week do you exercise? none    Outside of work, approximately how many minutes a day do you exercise?not applicable    If you drink alcohol do you typically have >3 drinks per day or >7 drinks per week? No    Do you usually eat at least 4 servings of fruit and vegetables a day, include whole grains & fiber and avoid regularly eating high fat or \"junk\" foods? Yes    Do you have any problems taking medications regularly?  No    Do you have any side effects from medications? none    Needs assistance for the following daily activities: no assistance needed    Which of the following safety concerns are present in your home?  none identified     Hearing impairment: No    In the past 6 months, have you been bothered by leaking of urine? yes    Mental Health:    In general, how would you rate your overall mental or emotional health? excellent  PHQ-2 Score:      Do you feel safe in your environment? Yes    Do you have a Health Care Directive? No: Advance care planning was reviewed with patient; patient declined at this time.    Additional concerns to address?  YES    Fall risk:  Fallen 2 or more times in the past year?: No  Any fall with injury in the past year?: No    Cognitive Screenin) Repeat 3 items (Leader, Season, Table)    2) Clock draw: NORMAL  3) 3 item recall: Recalls 3 objects  Results: 3 items recalled: COGNITIVE IMPAIRMENT LESS LIKELY    Mini-CogTM Copyright S Cyndi. Licensed by the author for use in [facility]; reprinted with permission (rodger@.[facility]). All rights reserved.      Do you have " sleep apnea, excessive snoring or daytime drowsiness?: no        Reviewed and updated as needed this visit by clinical staff         Reviewed and updated as needed this visit by Provider        Social History     Tobacco Use     Smoking status: Never Smoker     Smokeless tobacco: Never Used   Substance Use Topics     Alcohol use: No     Alcohol/week: 0.0 oz                           Current providers sharing in care for this patient include:   Patient Care Team:  Akhil Almodovar MD as PCP - General (Family Practice)  Akhil Almodovar MD as PCP - Assigned PCP    The following health maintenance items are reviewed in Epic and correct as of today:  Health Maintenance   Topic Date Due     SPIROMETRY ONETIME  1955     COPD ACTION PLAN Q1 YR  01/27/1954     HIV SCREEN (SYSTEM ASSIGNED)  12/27/1971     HEPATITIS C SCREENING  12/27/1971     PAP SCREENING Q3 YR (SYSTEM ASSIGNED)  06/06/2015     ZOSTER IMMUNIZATION (2 of 2) 10/23/2018     FALL RISK ASSESSMENT  12/27/2018     DEXA SCAN SCREENING (SYSTEM ASSIGNED)  12/27/2018     MAMMO SCREEN Q2 YR (SYSTEM ASSIGNED)  01/31/2019     PHQ-2 Q1 YR  10/09/2019     ADVANCE DIRECTIVE PLANNING Q5 YRS  05/25/2021     COLON CANCER SCREEN (SYSTEM ASSIGNED)  01/01/2022     LIPID SCREEN Q5 YR FEMALE (SYSTEM ASSIGNED)  01/23/2022     DTAP/TDAP/TD IMMUNIZATION (2 - Td) 06/06/2022     INFLUENZA VACCINE  Completed     IPV IMMUNIZATION  Aged Out     MENINGITIS IMMUNIZATION  Aged Out     BP Readings from Last 3 Encounters:   01/09/19 142/90   10/09/18 132/83   07/17/18 130/86    Wt Readings from Last 3 Encounters:   01/09/19 67.1 kg (148 lb)   10/09/18 66.7 kg (147 lb)   07/17/18 66.7 kg (147 lb)                  Patient Active Problem List   Diagnosis     Hyperlipidemia LDL goal <160     Lumbar spondylosis     Lumbar disc disease with radiculopathy     DJD (degenerative joint disease) of cervical spine     Malignant neoplasm of thyroid gland (H)     Nonspecific reaction to tuberculin test      Cyst of ovary     History of malignant neoplasm of thyroid     Postoperative hypothyroidism     Acute bronchitis     Chronic bronchitis (H)     Chronic rhinitis     No past surgical history on file.    Social History     Tobacco Use     Smoking status: Never Smoker     Smokeless tobacco: Never Used   Substance Use Topics     Alcohol use: No     Alcohol/week: 0.0 oz     No family history on file.      Current Outpatient Medications   Medication Sig Dispense Refill     Acetaminophen (TYLENOL PO) Take 325 mg by mouth every 4 hours as needed for mild pain or fever       albuterol (PROAIR RESPICLICK) 108 (90 Base) MCG/ACT AEPB inhaler Inhale 1 puff into the lungs every 6 hours as needed for shortness of breath / dyspnea or wheezing 1 Inhaler 1     Calcium Citrate-Vitamin D (CALCIUM + D PO) Take by mouth daily       chlorthalidone (HYGROTON) 25 MG tablet TAKE 1/2 TABLET(12.5 MG) BY MOUTH DAILY 45 tablet 1     levothyroxine (SYNTHROID/LEVOTHROID) 112 MCG tablet Take 1 tablet (112 mcg) by mouth daily 90 tablet 1     losartan (COZAAR) 100 MG tablet Take 1 tablet (100 mg) by mouth daily 90 tablet 1     montelukast (SINGULAIR) 10 MG tablet TAKE 1 TABLET(10 MG) BY MOUTH AT BEDTIME 30 tablet 3     Multiple Vitamins-Minerals (PRESERVISION AREDS PO) Take by mouth daily       simvastatin (ZOCOR) 20 MG tablet Take 1 tablet (20 mg) by mouth At Bedtime 90 tablet 1     No Known Allergies  Recent Labs   Lab Test 01/11/18  1405 01/23/17  1021 12/15/14 11/15/13   LDL  --  95 109 179*   HDL  --  66 57 56   TRIG  --  143 152 149   ALT 23 22 37 22   CR 0.55 0.75 0.64 0.70   GFRESTIMATED >90 78 >60 >60   GFRESTBLACK >90 >90   GFR Calc   >60 >60   POTASSIUM 3.6 3.7 3.9 3.9   TSH 0.01* 0.02* <0.015 8.115      Pneumonia Vaccine:Adults age 65+ who received Pneumovax (PPSV23) at 65 years or older: Should be given PCV13 > 1 year after their most recent PPSV23    ROS:  Constitutional, HEENT, cardiovascular, pulmonary, gi and gu  "systems are negative, except as otherwise noted.    OBJECTIVE:   /90 (Cuff Size: Adult Regular)   Pulse 69   Temp 96.7  F (35.9  C) (Tympanic)   Resp 12   Ht 1.6 m (5' 3\")   Wt 67.1 kg (148 lb)   SpO2 99%   BMI 26.22 kg/m   Estimated body mass index is 26.22 kg/m  as calculated from the following:    Height as of this encounter: 1.6 m (5' 3\").    Weight as of this encounter: 67.1 kg (148 lb).  EXAM:   GENERAL: healthy, alert and no distress  HENT: ear canals and TM's normal, nose and mouth without ulcers or lesions  NECK: no adenopathy, no asymmetry, masses, or scars and thyroid normal to palpation  RESP: lungs clear to auscultation - no rales, rhonchi or wheezes  CV: regular rate and rhythm, normal S1 S2, no S3 or S4, no murmur, click or rub, no peripheral edema and peripheral pulses strong  ABDOMEN: soft, nontender, no hepatosplenomegaly, no masses and bowel sounds normal  MS: no gross musculoskeletal defects noted, no edema  SKIN: no suspicious lesions or rashes  NEURO: Normal strength and tone, mentation intact and speech normal  BACK: no CVA tenderness, no paralumbar tenderness  PSYCH: mentation appears normal, affect normal/bright  LYMPH: no cervical, supraclavicular, axillary, or inguinal adenopathy        ASSESSMENT / PLAN:   1. Healthcare maintenance    - CBC with platelets  - Comprehensive metabolic panel  - Lipid panel reflex to direct LDL Fasting  - TSH with free T4 reflex    2. Vision changes  Has distorted vision left eye, was seen at eye clinic in [place], they said retinal change/detatchment, need further evaluation    - OPHTHALMOLOGY ADULT REFERRAL  - OFFICE/OUTPT VISIT,EST,LEVL III    3. MARISOL (obstructive sleep apnea)  Worsening for last 3-4 months with day time sleepiness   - SLEEP EVALUATION & MANAGEMENT REFERRAL - ADULT -[facility] [phone]  (Age 18 and up)  - OFFICE/OUTPT VISIT,EST,LEVL III    4. Urinary incontinence with continuous leakage  Worsening with " cough for last 3  months , will have her to see urology   - UROLOGY ADULT REFERRAL  - OFFICE/OUTPT VISIT,EST,LEVL III    5. Pulmonary nodules  Need to recheck for f/u   - CT Chest w Contrast; Future    6. Benign essential hypertension  Stable   - chlorthalidone (HYGROTON) 25 MG tablet; TAKE 1/2 TABLET(12.5 MG) BY MOUTH DAILY  Dispense: 45 tablet; Refill: 1  - losartan (COZAAR) 100 MG tablet; Take 1 tablet (100 mg) by mouth daily  Dispense: 90 tablet; Refill: 1  - simvastatin (ZOCOR) 20 MG tablet; Take 1 tablet (20 mg) by mouth At Bedtime  Dispense: 90 tablet; Refill: 1    7. Hyperlipidemia LDL goal <160    - chlorthalidone (HYGROTON) 25 MG tablet; TAKE 1/2 TABLET(12.5 MG) BY MOUTH DAILY  Dispense: 45 tablet; Refill: 1  - losartan (COZAAR) 100 MG tablet; Take 1 tablet (100 mg) by mouth daily  Dispense: 90 tablet; Refill: 1  - simvastatin (ZOCOR) 20 MG tablet; Take 1 tablet (20 mg) by mouth At Bedtime  Dispense: 90 tablet; Refill: 1    8. Hypothyroidism, unspecified type    - chlorthalidone (HYGROTON) 25 MG tablet; TAKE 1/2 TABLET(12.5 MG) BY MOUTH DAILY  Dispense: 45 tablet; Refill: 1  - losartan (COZAAR) 100 MG tablet; Take 1 tablet (100 mg) by mouth daily  Dispense: 90 tablet; Refill: 1  - simvastatin (ZOCOR) 20 MG tablet; Take 1 tablet (20 mg) by mouth At Bedtime  Dispense: 90 tablet; Refill: 1    9. Postoperative hypothyroidism    - levothyroxine (SYNTHROID/LEVOTHROID) 112 MCG tablet; Take 1 tablet (112 mcg) by mouth daily  Dispense: 90 tablet; Refill: 1    10. Purulent postnasal drainage    - montelukast (SINGULAIR) 10 MG tablet; TAKE 1 TABLET(10 MG) BY MOUTH AT BEDTIME  Dispense: 30 tablet; Refill: 3    End of Life Planning:  Patient currently has an advanced directive: Yes.  Practitioner is supportive of decision.    COUNSELING:  Reviewed preventive health counseling, as reflected in patient instructions    BP Readings from Last 1 Encounters:   10/09/18 132/83     Estimated body mass index is 26.04 kg/m  as  "calculated from the following:    Height as of 10/9/18: 1.6 m (5' 3\").    Weight as of 10/9/18: 66.7 kg (147 lb).           reports that  has never smoked. she has never used smokeless tobacco.      Appropriate preventive services were discussed with this patient, including applicable screening as appropriate for cardiovascular disease, diabetes, osteopenia/osteoporosis, and glaucoma.  As appropriate for age/gender, discussed screening for colorectal cancer, prostate cancer, breast cancer, and cervical cancer. Checklist reviewing preventive services available has been given to the patient.    Reviewed patients plan of care and provided an AVS. The Basic Care Plan (routine screening as documented in Health Maintenance) for Idalia meets the Care Plan requirement. This Care Plan has been established and reviewed with the Patient.    Counseling Resources:  ATP IV Guidelines  Pooled Cohorts Equation Calculator  Breast Cancer Risk Calculator  FRAX Risk Assessment  ICSI Preventive Guidelines  Dietary Guidelines for Americans, 2010  USDA's MyPlate  ASA Prophylaxis  Lung CA Screening    Akhil Almodovar MD  [facility]  "

## 2019-01-09 NOTE — PROGRESS NOTES
Screening Questionnaire for Adult Immunization    Are you sick today?   No   Do you have allergies to medications, food, a vaccine component or latex?   No   Have you ever had a serious reaction after receiving a vaccination?   No   Do you have a long-term health problem with heart disease, lung disease, asthma, kidney disease, metabolic disease (e.g. diabetes), anemia, or other blood disorder?   No   Do you have cancer, leukemia, HIV/AIDS, or any other immune system problem?   No   In the past 3 months, have you taken medications that affect  your immune system, such as prednisone, other steroids, or anticancer drugs; drugs for the treatment of rheumatoid arthritis, Crohn s disease, or psoriasis; or have you had radiation treatments?   No   Have you had a seizure, or a brain or other nervous system problem?   No   During the past year, have you received a transfusion of blood or blood     products, or been given immune (gamma) globulin or antiviral drug?   No   For women: Are you pregnant or is there a chance you could become        pregnant during the next month?   No   Have you received any vaccinations in the past 4 weeks?   No     Immunization questionnaire answers were all negative.        Per orders of Dr. Almodovar, injection of Shingrix given by Shelly Young. Patient instructed to remain in clinic for 15 minutes afterwards, and to report any adverse reaction to me immediately.       Screening performed by Shelly Young on 1/9/2019 at 11:01 AM.

## 2019-01-10 LAB
ALBUMIN SERPL-MCNC: 3.8 G/DL (ref 3.4–5)
ALP SERPL-CCNC: 61 U/L (ref 40–150)
ALT SERPL W P-5'-P-CCNC: 36 U/L (ref 0–50)
ANION GAP SERPL CALCULATED.3IONS-SCNC: 10 MMOL/L (ref 3–14)
AST SERPL W P-5'-P-CCNC: 17 U/L (ref 0–45)
BILIRUB SERPL-MCNC: 0.5 MG/DL (ref 0.2–1.3)
BUN SERPL-MCNC: 13 MG/DL (ref 7–30)
CALCIUM SERPL-MCNC: 9.2 MG/DL (ref 8.5–10.1)
CHLORIDE SERPL-SCNC: 104 MMOL/L (ref 94–109)
CHOLEST SERPL-MCNC: 215 MG/DL
CO2 SERPL-SCNC: 23 MMOL/L (ref 20–32)
CREAT SERPL-MCNC: 0.68 MG/DL (ref 0.52–1.04)
GFR SERPL CREATININE-BSD FRML MDRD: >90 ML/MIN/{1.73_M2}
GLUCOSE SERPL-MCNC: 112 MG/DL (ref 70–99)
HDLC SERPL-MCNC: 65 MG/DL
LDLC SERPL CALC-MCNC: 107 MG/DL
NONHDLC SERPL-MCNC: 150 MG/DL
POTASSIUM SERPL-SCNC: 3.5 MMOL/L (ref 3.4–5.3)
PROT SERPL-MCNC: 7.6 G/DL (ref 6.8–8.8)
SODIUM SERPL-SCNC: 137 MMOL/L (ref 133–144)
T4 FREE SERPL-MCNC: 1.43 NG/DL (ref 0.76–1.46)
TRIGL SERPL-MCNC: 214 MG/DL
TSH SERPL DL<=0.005 MIU/L-ACNC: 0.01 MU/L (ref 0.4–4)

## 2019-01-12 DIAGNOSIS — R06.02 SOB (SHORTNESS OF BREATH): ICD-10-CM

## 2019-01-14 RX ORDER — ALBUTEROL SULFATE 90 UG/1
POWDER, METERED RESPIRATORY (INHALATION)
Qty: 1 INHALER | Refills: 3 | Status: SHIPPED | OUTPATIENT
Start: 2019-01-14 | End: 2023-03-23

## 2019-01-14 NOTE — TELEPHONE ENCOUNTER
"Requested Prescriptions   Pending Prescriptions Disp Refills     PROAIR RESPICLICK 108 (90 Base) MCG/ACT inhaler [Pharmacy Med Name: PROAIR RESPICLICK ORAL PWD INH(200)]  Last Written Prescription Date:  10/9/18  Last Fill Quantity: 1,  # refills: 1   Last office visit: 1/9/2019 with prescribing provider:  Scooby   Future Office Visit:   Next 5 appointments (look out 90 days)    Jan 14, 2019  2:45 PM CST  Office Visit with Akhil Almodovar MD, [employer]  [facility] ([address] [address]  [phone]           0     Sig: INHALE 1 PUFF INTO THE LUNGS EVERY 6 HOURS AS NEEDED FOR SHORTNESS OF BREATH OR DIFFICULT BREATHING OR WHEEZING    Asthma Maintenance Inhalers - Anticholinergics Passed - 1/12/2019  3:10 AM       Passed - Patient is age 12 years or older       Passed - Recent (12 mo) or future (30 days) visit within the authorizing provider's specialty    Patient had office visit in the last 12 months or has a visit in the next 30 days with authorizing provider or within the authorizing provider's specialty.  See \"Patient Info\" tab in inbasket, or \"Choose Columns\" in Meds & Orders section of the refill encounter.             Passed - Medication is active on med list          "

## 2019-01-15 ENCOUNTER — OFFICE VISIT (OUTPATIENT)
Dept: FAMILY MEDICINE | Facility: CLINIC | Age: 64
End: 2019-01-15
Payer: COMMERCIAL

## 2019-01-15 VITALS
TEMPERATURE: 96.7 F | OXYGEN SATURATION: 97 % | HEIGHT: 63 IN | WEIGHT: 149.8 LBS | SYSTOLIC BLOOD PRESSURE: 133 MMHG | BODY MASS INDEX: 26.54 KG/M2 | DIASTOLIC BLOOD PRESSURE: 85 MMHG | HEART RATE: 48 BPM

## 2019-01-15 DIAGNOSIS — Z11.4 SCREENING FOR HIV (HUMAN IMMUNODEFICIENCY VIRUS): ICD-10-CM

## 2019-01-15 DIAGNOSIS — Z12.31 VISIT FOR SCREENING MAMMOGRAM: ICD-10-CM

## 2019-01-15 DIAGNOSIS — Z78.0 ASYMPTOMATIC POSTMENOPAUSAL STATUS: ICD-10-CM

## 2019-01-15 DIAGNOSIS — Z12.4 SCREENING FOR MALIGNANT NEOPLASM OF CERVIX: ICD-10-CM

## 2019-01-15 DIAGNOSIS — M51.16 LUMBAR DISC DISEASE WITH RADICULOPATHY: ICD-10-CM

## 2019-01-15 DIAGNOSIS — M81.8 OTHER OSTEOPOROSIS, UNSPECIFIED PATHOLOGICAL FRACTURE PRESENCE: Primary | ICD-10-CM

## 2019-01-15 DIAGNOSIS — Z11.59 NEED FOR HEPATITIS C SCREENING TEST: ICD-10-CM

## 2019-01-15 DIAGNOSIS — M47.816 LUMBAR SPONDYLOSIS: ICD-10-CM

## 2019-01-15 DIAGNOSIS — M47.22 OSTEOARTHRITIS OF SPINE WITH RADICULOPATHY, CERVICAL REGION: ICD-10-CM

## 2019-01-15 PROCEDURE — 99214 OFFICE O/P EST MOD 30 MIN: CPT | Performed by: FAMILY MEDICINE

## 2019-01-15 RX ORDER — CELECOXIB 100 MG/1
100 CAPSULE ORAL 2 TIMES DAILY
Qty: 180 CAPSULE | Refills: 1 | Status: SHIPPED | OUTPATIENT
Start: 2019-01-15 | End: 2019-07-19

## 2019-01-15 ASSESSMENT — MIFFLIN-ST. JEOR: SCORE: 1193.62

## 2019-01-15 NOTE — PROGRESS NOTES
SUBJECTIVE:   Idalia Howe is a 63 year old female who presents to clinic today for the following health issues:      Work comp issues   Going on for 6 months. Pain in neck and upper back left side. Constant pain     Problem list and histories reviewed & adjusted, as indicated.  Additional history: as documented    Patient Active Problem List   Diagnosis     Hyperlipidemia LDL goal <160     Lumbar spondylosis     Lumbar disc disease with radiculopathy     DJD (degenerative joint disease) of cervical spine     Malignant neoplasm of thyroid gland (H)     Nonspecific reaction to tuberculin test     Cyst of ovary     History of malignant neoplasm of thyroid     Postoperative hypothyroidism     Acute bronchitis     Chronic bronchitis (H)     Chronic rhinitis     History reviewed. No pertinent surgical history.    Social History     Tobacco Use     Smoking status: Never Smoker     Smokeless tobacco: Never Used   Substance Use Topics     Alcohol use: No     Alcohol/week: 0.0 oz     History reviewed. No pertinent family history.      Current Outpatient Medications   Medication Sig Dispense Refill     Acetaminophen (TYLENOL PO) Take 325 mg by mouth every 4 hours as needed for mild pain or fever       Calcium Citrate-Vitamin D (CALCIUM + D PO) Take by mouth daily       celecoxib (CELEBREX) 100 MG capsule Take 1 capsule (100 mg) by mouth 2 times daily 180 capsule 1     chlorthalidone (HYGROTON) 25 MG tablet TAKE 1/2 TABLET(12.5 MG) BY MOUTH DAILY 45 tablet 1     levothyroxine (SYNTHROID/LEVOTHROID) 112 MCG tablet Take 1 tablet (112 mcg) by mouth daily 90 tablet 1     losartan (COZAAR) 100 MG tablet Take 1 tablet (100 mg) by mouth daily 90 tablet 1     montelukast (SINGULAIR) 10 MG tablet TAKE 1 TABLET(10 MG) BY MOUTH AT BEDTIME 30 tablet 3     Multiple Vitamins-Minerals (PRESERVISION AREDS PO) Take by mouth daily       PROAIR RESPICLICK 108 (90 Base) MCG/ACT inhaler INHALE 1 PUFF INTO THE LUNGS EVERY 6 HOURS AS NEEDED FOR  "SHORTNESS OF BREATH OR DIFFICULT BREATHING OR WHEEZING 1 Inhaler 3     simvastatin (ZOCOR) 20 MG tablet Take 1 tablet (20 mg) by mouth At Bedtime 90 tablet 1     No Known Allergies  Recent Labs   Lab Test 01/09/19  1043 01/11/18  1405 01/23/17  1021 12/15/14   *  --  95 109   HDL 65  --  66 57   TRIG 214*  --  143 152   ALT 36 23 22 37   CR 0.68 0.55 0.75 0.64   GFRESTIMATED >90 >90 78 >60   GFRESTBLACK >90 >90 >90   GFR Calc   >60   POTASSIUM 3.5 3.6 3.7 3.9   TSH 0.01* 0.01* 0.02* <0.015      BP Readings from Last 3 Encounters:   01/15/19 133/85   01/09/19 142/90   10/09/18 132/83    Wt Readings from Last 3 Encounters:   01/15/19 67.9 kg (149 lb 12.8 oz)   01/09/19 67.1 kg (148 lb)   10/09/18 66.7 kg (147 lb)                    Reviewed and updated as needed this visit by clinical staff  Tobacco  Allergies  Meds  Med Hx  Surg Hx  Fam Hx  Soc Hx      Reviewed and updated as needed this visit by Provider         ROS:  Constitutional, HEENT, cardiovascular, pulmonary, gi and gu systems are negative, except as otherwise noted.    OBJECTIVE:     /85   Pulse (!) 48   Temp 96.7  F (35.9  C) (Tympanic)   Ht 1.6 m (5' 3\")   Wt 67.9 kg (149 lb 12.8 oz)   SpO2 97%   BMI 26.54 kg/m    Body mass index is 26.54 kg/m .  GENERAL: healthy, alert and no distress  NECK: no adenopathy, no asymmetry, masses, or scars and thyroid normal to palpation  RESP: lungs clear to auscultation - no rales, rhonchi or wheezes  CV: regular rate and rhythm, normal S1 S2, no S3 or S4, no murmur, click or rub, no peripheral edema and peripheral pulses strong  ABDOMEN: soft, nontender, no hepatosplenomegaly, no masses and bowel sounds normal  MS: no gross musculoskeletal defects noted, no edema        ASSESSMENT/PLAN:   Idalia was seen today for work comp.    Diagnoses and all orders for this visit:    Other osteoporosis, unspecified pathological fracture presence  -     DX Hip/Pelvis/Spine; " Future    Asymptomatic postmenopausal status    Visit for screening mammogram    Screening for malignant neoplasm of cervix    Need for hepatitis C screening test    Screening for HIV (human immunodeficiency virus)    Lumbar spondylosis  -     celecoxib (CELEBREX) 100 MG capsule; Take 1 capsule (100 mg) by mouth 2 times daily    Lumbar disc disease with radiculopathy  -     celecoxib (CELEBREX) 100 MG capsule; Take 1 capsule (100 mg) by mouth 2 times daily    Osteoarthritis of spine with radiculopathy, cervical region  -     celecoxib (CELEBREX) 100 MG capsule; Take 1 capsule (100 mg) by mouth 2 times daily    Other orders  -     Cancel: DEXA HIP/PELVIS/SPINE - Future; Future  -     Cancel: MA SCREENING DIGITAL BILAT - Future  (s+30); Future  -     Cancel: Hepatitis C Screen Reflex to HCV RNA Quant and Genotype  -     Cancel: HIV Screening          FUTURE APPOINTMENTS:       - Follow-up visit in 6 months     Akhil Almodovar MD  [facility] [place]

## 2019-01-15 NOTE — LETTER
[address] [address]  Phone: [phone]    January 15, 2019        Idalia Howe  [address] [place] [address]          To whom it may concern:    RE: Idalia Friedman has been working with current restrictive status since last time. She has worked with repetitive movement including bending back and neck/squatting legs/stretching shoulders and arms/frequent rotation of back and neck related with current position at work. 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 So, I recommend her that she should maintain the level of activity in carrying and level lifting as noted below. Then, we will reassess the functional status in 6 months around July15th, 2019 or earlier as needed.       - May work up to 8 hours per day.  - Sitting - standard chair but stretching and positional changes as needed   - Standing and walking - stretching or resting 10 minutes every 30 minutes   - Carrying and level lifting - less than 10 lbs, occasionally(less than 50% of the day's shift). For example, at '[employer]' at her work, she cannot perform more than five trays per one session per day, and not more than three sessions per day    - Bending and lifting - may lift up to 15 lbs on occasional basis, up to 5 times per hour   - Pushing and pulling - less than 15 lbs at height between waist and chest and without bending forward       Please contact me for questions or concerns.        Sincerely,      Akhil Almodovar MD

## 2019-01-16 ENCOUNTER — OFFICE VISIT (OUTPATIENT)
Dept: UROLOGY | Facility: CLINIC | Age: 64
End: 2019-01-16
Payer: COMMERCIAL

## 2019-01-16 VITALS
WEIGHT: 149 LBS | HEIGHT: 63 IN | SYSTOLIC BLOOD PRESSURE: 128 MMHG | BODY MASS INDEX: 26.4 KG/M2 | DIASTOLIC BLOOD PRESSURE: 74 MMHG

## 2019-01-16 DIAGNOSIS — M62.89 PELVIC FLOOR DYSFUNCTION: ICD-10-CM

## 2019-01-16 DIAGNOSIS — Z87.440 PERSONAL HISTORY OF URINARY TRACT INFECTION: ICD-10-CM

## 2019-01-16 DIAGNOSIS — N32.81 URGENCY-FREQUENCY SYNDROME: ICD-10-CM

## 2019-01-16 DIAGNOSIS — M51.16 LUMBAR DISC DISEASE WITH RADICULOPATHY: ICD-10-CM

## 2019-01-16 DIAGNOSIS — E03.9 HYPOTHYROIDISM, UNSPECIFIED TYPE: ICD-10-CM

## 2019-01-16 DIAGNOSIS — N39.46 MIXED INCONTINENCE: ICD-10-CM

## 2019-01-16 DIAGNOSIS — R15.1 FECAL SMEARING: ICD-10-CM

## 2019-01-16 DIAGNOSIS — R32 URINARY INCONTINENCE, UNSPECIFIED TYPE: Primary | ICD-10-CM

## 2019-01-16 DIAGNOSIS — M79.18 MYALGIA OF PELVIC FLOOR: ICD-10-CM

## 2019-01-16 LAB
ALBUMIN UR-MCNC: NEGATIVE MG/DL
APPEARANCE UR: CLEAR
BILIRUB UR QL STRIP: NEGATIVE
COLOR UR AUTO: YELLOW
GLUCOSE UR STRIP-MCNC: NEGATIVE MG/DL
HGB UR QL STRIP: NEGATIVE
KETONES UR STRIP-MCNC: NEGATIVE MG/DL
LEUKOCYTE ESTERASE UR QL STRIP: ABNORMAL
NITRATE UR QL: NEGATIVE
PH UR STRIP: 7 PH (ref 5–7)
RESIDUAL VOLUME (RV) (EXTERNAL): NORMAL
SOURCE: ABNORMAL
SP GR UR STRIP: 1.01 (ref 1–1.03)
UROBILINOGEN UR STRIP-ACNC: 0.2 EU/DL (ref 0.2–1)

## 2019-01-16 PROCEDURE — 99204 OFFICE O/P NEW MOD 45 MIN: CPT | Mod: 25 | Performed by: UROLOGY

## 2019-01-16 PROCEDURE — 51798 US URINE CAPACITY MEASURE: CPT | Performed by: UROLOGY

## 2019-01-16 PROCEDURE — 81003 URINALYSIS AUTO W/O SCOPE: CPT | Performed by: UROLOGY

## 2019-01-16 ASSESSMENT — MIFFLIN-ST. JEOR: SCORE: 1189.99

## 2019-01-16 ASSESSMENT — PAIN SCALES - GENERAL: PAINLEVEL: NO PAIN (0)

## 2019-01-16 NOTE — PROGRESS NOTES
January 16, 2019    Referring Provider: Akhil Almodovar MD  [address] [place] [address]    Primary Care Provider: Akhil Almodovar    CC: Urinary incontinence    HPI:  Idalia Howe is a 63 year old Estonian female  who presents for evaluation of her pelvic floor symptoms.  Patient does know some English but today is accompanied by an  to assist.  She reports that she is here today for urinary urgency, frequency Q1 hr and urgency incontinence for the past 1-2 years.  Maybe nocturia x2 but she has always has worked nights and she has never slept more than a few hours at a time.  She also notes a long history of stress incontinence, especially in the colder months when she is coughing more.  She reports that she is using pads because of the leakage and gets perineal irritation and has had a couple of UTIs (positive cultures January and October of 2018 in Epic).  Also notes occasional urine odor.  Sometimes does note constipation-intermittently takes probiotics for. Also occasional fecal smearing and sometimes some thin stools.  Last colonoscopy was in maybe in 2015 and had polyps.  Constant feeling of heavy sensation in the pelvis and that her bladder is not emptying.  Really pushes to get all the urine all the urine.  Has not tried anything for her bladder symptoms    Denies gross hematuria, febrile UTI or hospitalization for, vaginal bleeding, vaginal bulge.  She is not currently sexually active, does report a history of dyspareunia in the past.  She denies history of abuse and reports that she feels safe at home. Denies any family history of urologic disease or malignancy.  Does note that one of her cousins did require dialysis and transplant.    1 NVSD with vacuum delivery, had post partum laparoscopic drainage for hematoma, has had a fallopian tube removed for some sort of abnormality but she denies it being cancer    Currently is on antibiotics for URI    Past Medical History:  "  Diagnosis Date     Back pain      Hip pain      Neuropathic pain    Lumbar spondylosis, cervical DJD, ovarian cyst, history of thyroid cancer s/p surgery    No past surgical history on file.    Social History     Socioeconomic History     Marital status:      Spouse name: Not on file     Number of children: 1     Years of education: Not on file     Highest education level: Not on file   Social Needs     Financial resource strain: Not on file     Food insecurity - worry: Not on file     Food insecurity - inability: Not on file     Transportation needs - medical: Not on file     Transportation needs - non-medical: Not on file   Occupational History     Not on file   Tobacco Use     Smoking status: Never Smoker     Smokeless tobacco: Never Used   Substance and Sexual Activity     Alcohol use: No     Alcohol/week: 0.0 oz     Drug use: No     Sexual activity: Yes   Other Topics Concern     Parent/sibling w/ CABG, MI or angioplasty before 65F 55M? Not Asked   Social History Narrative     Not on file     No family history on file.    ROS    No Known Allergies    Current Outpatient Medications   Medication     Acetaminophen (TYLENOL PO)     Calcium Citrate-Vitamin D (CALCIUM + D PO)     chlorthalidone (HYGROTON) 25 MG tablet     levothyroxine (SYNTHROID/LEVOTHROID) 112 MCG tablet     losartan (COZAAR) 100 MG tablet     montelukast (SINGULAIR) 10 MG tablet     Multiple Vitamins-Minerals (PRESERVISION AREDS PO)     PROAIR RESPICLICK 108 (90 Base) MCG/ACT inhaler     simvastatin (ZOCOR) 20 MG tablet     celecoxib (CELEBREX) 100 MG capsule     No current facility-administered medications for this visit.      /74   Ht 1.6 m (5' 3\")   Wt 67.6 kg (149 lb)   BMI 26.39 kg/m   No LMP recorded. Patient is postmenopausal. Body mass index is 26.39 kg/m .  She is alert and oriented.  She is well groomed, comfortable in no acute distress. Normal mood and affect.   Non-labored breathing. Normocephalic without masses, " lesions, obvious abnormalities. Abdomen is soft, non-tender, non-distended, no CVAT.  Normal external female genitalia.  Negative ESST.  Speculum and bimanual exam are remarkable for atrophic vaginal tissues, diffuse myofascial tenderness of the pelvic floor.  She has stage I support on supine strain.  0/5 kegels.  Skin dry, warm to touch. No lower extremity edema.  Full ROM in extremities with normal gait.      Urine dip trace leuks on a voided specimen    PVR 33 mL by bladder scan    A/P: Idalia Howe is a 63 year old Swedish female with mixed urinary incontinence, urinary urgency frequency, fecal smearing, low back pain, myofascial tenderness of the pelvic floor and pelvic floor dysfunction    Given her family history of colon cancer and her bowel issues want her to make sure she is up to date on her colon cancer screening    For the fecal smearing recommend daily fiber supplement to bulk her stools    We discussed how her pelvic floor symptoms are related to the physical exam findings and her pelvic floor myofascial dysfunction.  We discussed how the recommended treatment is dedicated pelvic floor therapy.  We discussed how the pelvic floor physical therapy works and patient is agreeable.  Referral was placed.      She is long overdue for pap smear and recommend that she do this    Barrier ointments for pad irritation    RTC 4 months, sooner if needed    50 minutes were spent with the patient today, > 50% in counseling and coordination of care    Dianna Lunsford MD MPH    Urology    CC  Patient Care Team:  Kai Almodovar MD as PCP - General (Family Practice)  Kai Almodovar MD as PCP - Assigned PCP  KAI ALMODOVAR

## 2019-01-16 NOTE — LETTER
1/16/2019       RE: Idalia Howe  [address] [place] [address]     Dear Colleague,    Thank you for referring your patient, Idalia Howe, to the [facility] [place] at [facility]. Please see a copy of my visit note below.    January 16, 2019    Referring Provider: Akhil Almodovar MD  [address] [place] [address]    Primary Care Provider: Akhil Almodovar    CC: Urinary incontinence    HPI:  Idalia Howe is a 63 year old Serbian female  who presents for evaluation of her pelvic floor symptoms.  Patient does know some English but today is accompanied by an  to assist.  She reports that she is here today for urinary urgency, frequency Q1 hr and urgency incontinence for the past 1-2 years.  Maybe nocturia x2 but she has always has worked nights and she has never slept more than a few hours at a time.  She also notes a long history of stress incontinence, especially in the colder months when she is coughing more.  She reports that she is using pads because of the leakage and gets perineal irritation and has had a couple of UTIs (positive cultures January and October of 2018 in Epic).  Also notes occasional urine odor.  Sometimes does note constipation-intermittently takes probiotics for. Also occasional fecal smearing and sometimes some thin stools.  Last colonoscopy was in maybe in 2015 and had polyps.  Constant feeling of heavy sensation in the pelvis and that her bladder is not emptying.  Really pushes to get all the urine all the urine.  Has not tried anything for her bladder symptoms    Denies gross hematuria, febrile UTI or hospitalization for, vaginal bleeding, vaginal bulge.  She is not currently sexually active, does report a history of dyspareunia in the past.  She denies history of abuse and reports that she feels safe at home. Denies any family history of urologic disease or malignancy.  Does note that  one of her cousins did require dialysis and transplant.    1 NVSD with vacuum delivery, had post partum laparoscopic drainage for hematoma, has had a fallopian tube removed for some sort of abnormality but she denies it being cancer    Currently is on antibiotics for URI    Past Medical History:   Diagnosis Date     Back pain      Hip pain      Neuropathic pain    Lumbar spondylosis, cervical DJD, ovarian cyst, history of thyroid cancer s/p surgery    No past surgical history on file.    Social History     Socioeconomic History     Marital status:      Spouse name: Not on file     Number of children: 1     Years of education: Not on file     Highest education level: Not on file   Social Needs     Financial resource strain: Not on file     Food insecurity - worry: Not on file     Food insecurity - inability: Not on file     Transportation needs - medical: Not on file     Transportation needs - non-medical: Not on file   Occupational History     Not on file   Tobacco Use     Smoking status: Never Smoker     Smokeless tobacco: Never Used   Substance and Sexual Activity     Alcohol use: No     Alcohol/week: 0.0 oz     Drug use: No     Sexual activity: Yes   Other Topics Concern     Parent/sibling w/ CABG, MI or angioplasty before 65F 55M? Not Asked   Social History Narrative     Not on file     No family history on file.    No Known Allergies    Current Outpatient Medications   Medication     Acetaminophen (TYLENOL PO)     Calcium Citrate-Vitamin D (CALCIUM + D PO)     chlorthalidone (HYGROTON) 25 MG tablet     levothyroxine (SYNTHROID/LEVOTHROID) 112 MCG tablet     losartan (COZAAR) 100 MG tablet     montelukast (SINGULAIR) 10 MG tablet     Multiple Vitamins-Minerals (PRESERVISION AREDS PO)     PROAIR RESPICLICK 108 (90 Base) MCG/ACT inhaler     simvastatin (ZOCOR) 20 MG tablet     celecoxib (CELEBREX) 100 MG capsule     No current facility-administered medications for this visit.      /74   Ht 1.6 m (5'  "3\")   Wt 67.6 kg (149 lb)   BMI 26.39 kg/m    No LMP recorded. Patient is postmenopausal. Body mass index is 26.39 kg/m .  She is alert and oriented.  She is well groomed, comfortable in no acute distress. Normal mood and affect.   Non-labored breathing. Normocephalic without masses, lesions, obvious abnormalities. Abdomen is soft, non-tender, non-distended, no CVAT.  Normal external female genitalia.  Negative ESST.  Speculum and bimanual exam are remarkable for atrophic vaginal tissues, diffuse myofascial tenderness of the pelvic floor.  She has stage I support on supine strain.  0/5 kegels.  Skin dry, warm to touch. No lower extremity edema.  Full ROM in extremities with normal gait.      Urine dip trace leuks on a voided specimen    PVR 33 mL by bladder scan    A/P: Idalia Howe is a 63 year old Sinhala female with mixed urinary incontinence, urinary urgency frequency, fecal smearing, low back pain, myofascial tenderness of the pelvic floor and pelvic floor dysfunction    Given her family history of colon cancer and her bowel issues want her to make sure she is up to date on her colon cancer screening    For the fecal smearing recommend daily fiber supplement to bulk her stools    We discussed how her pelvic floor symptoms are related to the physical exam findings and her pelvic floor myofascial dysfunction.  We discussed how the recommended treatment is dedicated pelvic floor therapy.  We discussed how the pelvic floor physical therapy works and patient is agreeable.  Referral was placed.      She is long overdue for pap smear and recommend that she do this    Barrier ointments for pad irritation    RTC 4 months, sooner if needed    50 minutes were spent with the patient today, > 50% in counseling and coordination of care    Dianna Lunsford MD MPH    Urology    CC  Patient Care Team:  Kai Almodovar MD as PCP - General (Family Practice)  Kai Almodovar MD as PCP - Assigned PCP  KAI ALMODOVAR  "

## 2019-01-16 NOTE — NURSING NOTE
Chief Complaint   Patient presents with     Consult     New pt consult here for urinary incontinence/leakage. Pt is currently using urinary pads for incontinence.      [facility]

## 2019-01-16 NOTE — PATIENT INSTRUCTIONS
Websites with free information:    [employer] patient website: www.voicesforpfd.org    Total Control Program: www.totalcontrolprogram.com    Recommend that you take a daily fiber supplement that you mix in the water.  Make sure you are up to date on your colon cancer screening    Please see someone to have a pap smear done as you are over due    Barrier ointments to help with the pad irritation include zinc oxide, calmoseptine or criticaid clear    Please see one of the dedicated pelvic floor physical therapist at [facility] in [place] [phone]    Please return to see me in 4 months, sooner if needed    It was a pleasure meeting with you today.  Thank you for allowing me and my team the privilege of caring for you today.  YOU are the reason we are here, and I truly hope we provided you with the excellent service you deserve.  Please let us know if there is anything else we can do for you so that we can be sure you are leaving completely satisfied with your care experience.

## 2019-01-21 ENCOUNTER — TRANSFERRED RECORDS (OUTPATIENT)
Dept: HEALTH INFORMATION MANAGEMENT | Facility: CLINIC | Age: 64
End: 2019-01-21

## 2019-02-03 DIAGNOSIS — I10 BENIGN ESSENTIAL HYPERTENSION: ICD-10-CM

## 2019-02-03 DIAGNOSIS — E03.9 HYPOTHYROIDISM, UNSPECIFIED TYPE: ICD-10-CM

## 2019-02-03 DIAGNOSIS — E78.5 HYPERLIPIDEMIA LDL GOAL <160: ICD-10-CM

## 2019-02-04 ENCOUNTER — HOSPITAL ENCOUNTER (OUTPATIENT)
Dept: MAMMOGRAPHY | Facility: CLINIC | Age: 64
End: 2019-02-04
Attending: FAMILY MEDICINE
Payer: COMMERCIAL

## 2019-02-04 ENCOUNTER — HOSPITAL ENCOUNTER (OUTPATIENT)
Dept: BONE DENSITY | Facility: CLINIC | Age: 64
Discharge: HOME | End: 2019-02-04
Attending: FAMILY MEDICINE | Admitting: FAMILY MEDICINE
Payer: COMMERCIAL

## 2019-02-04 DIAGNOSIS — Z12.31 VISIT FOR SCREENING MAMMOGRAM: ICD-10-CM

## 2019-02-04 DIAGNOSIS — M81.8 OTHER OSTEOPOROSIS, UNSPECIFIED PATHOLOGICAL FRACTURE PRESENCE: ICD-10-CM

## 2019-02-04 PROCEDURE — 77067 SCR MAMMO BI INCL CAD: CPT

## 2019-02-04 PROCEDURE — 77080 DXA BONE DENSITY AXIAL: CPT

## 2019-02-04 RX ORDER — SIMVASTATIN 20 MG
TABLET ORAL
Qty: 30 TABLET | Refills: 0 | OUTPATIENT
Start: 2019-02-04

## 2019-02-04 NOTE — TELEPHONE ENCOUNTER
"Requested Prescriptions   Pending Prescriptions Disp Refills     simvastatin (ZOCOR) 20 MG tablet [Pharmacy Med Name: SIMVASTATIN 20MG TABLETS] 30 tablet 0     Sig: TAKE ONE TABLET BY MOUTH EVERY NIGHT AT BEDTIME    Statins Protocol Passed - 2/3/2019  9:56 AM       Passed - LDL on file in past 12 months    Recent Labs   Lab Test 01/09/19  1043   *        Last Written Prescription Date:  1/9/2019  Last Fill Quantity: 90,  # refills: 1   Last office visit: 1/15/2019 with prescribing provider:  CRSITINA Almodovar  Future Office Visit:        Passed - No abnormal creatine kinase in past 12 months    No lab results found.            Passed - Recent (12 mo) or future (30 days) visit within the authorizing provider's specialty    Patient had office visit in the last 12 months or has a visit in the next 30 days with authorizing provider or within the authorizing provider's specialty.  See \"Patient Info\" tab in inbasket, or \"Choose Columns\" in Meds & Orders section of the refill encounter.             Passed - Medication is active on med list       Passed - Patient is age 18 or older       Passed - No active pregnancy on record       Passed - No positive pregnancy test in past 12 months          "

## 2019-02-25 ENCOUNTER — HOSPITAL ENCOUNTER (OUTPATIENT)
Dept: CT IMAGING | Facility: CLINIC | Age: 64
Discharge: HOME | End: 2019-02-25
Attending: FAMILY MEDICINE | Admitting: FAMILY MEDICINE
Payer: COMMERCIAL

## 2019-02-25 DIAGNOSIS — R91.8 PULMONARY NODULES: ICD-10-CM

## 2019-02-25 PROCEDURE — 71250 CT THORAX DX C-: CPT

## 2019-03-24 DIAGNOSIS — I10 BENIGN ESSENTIAL HYPERTENSION: ICD-10-CM

## 2019-03-24 DIAGNOSIS — E78.5 HYPERLIPIDEMIA LDL GOAL <160: ICD-10-CM

## 2019-03-24 DIAGNOSIS — E03.9 HYPOTHYROIDISM, UNSPECIFIED TYPE: ICD-10-CM

## 2019-03-25 RX ORDER — LOSARTAN POTASSIUM 100 MG/1
TABLET ORAL
Qty: 90 TABLET | Refills: 0 | OUTPATIENT
Start: 2019-03-25

## 2019-03-25 NOTE — TELEPHONE ENCOUNTER
"Requested Prescriptions   Pending Prescriptions Disp Refills     losartan (COZAAR) 100 MG tablet [Pharmacy Med Name: LOSARTAN 100MG TABLETS] 90 tablet 0     Sig: TAKE 1 TABLET(100 MG) BY MOUTH DAILY    Angiotensin-II Receptors Passed - 3/24/2019 10:24 AM       Passed - Blood pressure under 140/90 in past 12 months    BP Readings from Last 3 Encounters:   01/16/19 128/74   01/15/19 133/85   01/09/19 142/90            Last Written Prescription Date:  1/9/2019  Last Fill Quantity: 90,  # refills: 1   Last office visit: 1/15/2019 with prescribing provider:  CRISTINA Almodovar  Future Office Visit:        Passed - Recent (12 mo) or future (30 days) visit within the authorizing provider's specialty    Patient had office visit in the last 12 months or has a visit in the next 30 days with authorizing provider or within the authorizing provider's specialty.  See \"Patient Info\" tab in inbasket, or \"Choose Columns\" in Meds & Orders section of the refill encounter.             Passed - Medication is active on med list       Passed - Patient is age 18 or older       Passed - No active pregnancy on record       Passed - Normal serum creatinine on file in past 12 months    Recent Labs   Lab Test 01/09/19  1043   CR 0.68            Passed - Normal serum potassium on file in past 12 months    Recent Labs   Lab Test 01/09/19  1043   POTASSIUM 3.5                   Passed - No positive pregnancy test in past 12 months          "

## 2019-03-25 NOTE — TELEPHONE ENCOUNTER
Rx denied. Patient requesting refill too soon. No pharmacy change.     Mari RESTREPON, RN   [facility]

## 2019-05-20 DIAGNOSIS — J32.9 PURULENT POSTNASAL DRAINAGE: ICD-10-CM

## 2019-05-20 RX ORDER — MONTELUKAST SODIUM 10 MG/1
TABLET ORAL
Qty: 30 TABLET | Refills: 5 | Status: SHIPPED | OUTPATIENT
Start: 2019-05-20 | End: 2019-07-22

## 2019-05-20 NOTE — TELEPHONE ENCOUNTER
"Requested Prescriptions   Pending Prescriptions Disp Refills     montelukast (SINGULAIR) 10 MG tablet [Pharmacy Med Name: MONTELUKAST 10MG TABLETS] 30 tablet 0     Sig: TAKE 1 TABLET(10 MG) BY MOUTH AT BEDTIME  Last Written Prescription Date:  1/9/19  Last Fill Quantity: 30,  # refills: 3   Last office visit: 1/15/2019 with prescribing provider:  Scooby   Future Office Visit:           Leukotriene Inhibitors Protocol Passed - 5/20/2019  3:10 AM        Passed - Patient is age 12 or older     If patient is under 16, ok to refill using age based dosing.           Passed - Recent (12 mo) or future (30 days) visit within the authorizing provider's specialty     Patient had office visit in the last 12 months or has a visit in the next 30 days with authorizing provider or within the authorizing provider's specialty.  See \"Patient Info\" tab in inbasket, or \"Choose Columns\" in Meds & Orders section of the refill encounter.              Passed - Medication is active on med list          "

## 2019-07-16 ENCOUNTER — OFFICE VISIT (OUTPATIENT)
Dept: FAMILY MEDICINE | Facility: CLINIC | Age: 64
End: 2019-07-16
Payer: COMMERCIAL

## 2019-07-16 VITALS
RESPIRATION RATE: 14 BRPM | BODY MASS INDEX: 26.05 KG/M2 | SYSTOLIC BLOOD PRESSURE: 128 MMHG | HEIGHT: 63 IN | DIASTOLIC BLOOD PRESSURE: 84 MMHG | TEMPERATURE: 96.6 F | WEIGHT: 147 LBS | HEART RATE: 56 BPM | OXYGEN SATURATION: 97 %

## 2019-07-16 DIAGNOSIS — M47.816 LUMBAR SPONDYLOSIS: ICD-10-CM

## 2019-07-16 DIAGNOSIS — M47.22 OSTEOARTHRITIS OF SPINE WITH RADICULOPATHY, CERVICAL REGION: Primary | ICD-10-CM

## 2019-07-16 DIAGNOSIS — M51.16 LUMBAR DISC DISEASE WITH RADICULOPATHY: ICD-10-CM

## 2019-07-16 DIAGNOSIS — M81.8 OTHER OSTEOPOROSIS, UNSPECIFIED PATHOLOGICAL FRACTURE PRESENCE: ICD-10-CM

## 2019-07-16 PROCEDURE — 99213 OFFICE O/P EST LOW 20 MIN: CPT | Performed by: FAMILY MEDICINE

## 2019-07-16 ASSESSMENT — MIFFLIN-ST. JEOR: SCORE: 1180.92

## 2019-07-16 NOTE — PROGRESS NOTES
Subjective     Idalia Howe is a 63 year old female who presents to clinic today for the following health issues:    HPI   Musculoskeletal problem/pain Follow-up         Description  Location: neck and upper back. Pt states her pain is getting worse.       Patient Active Problem List   Diagnosis     Hyperlipidemia LDL goal <160     Lumbar spondylosis     Lumbar disc disease with radiculopathy     DJD (degenerative joint disease) of cervical spine     Malignant neoplasm of thyroid gland (H)     Nonspecific reaction to tuberculin test     Cyst of ovary     History of malignant neoplasm of thyroid     Postoperative hypothyroidism     Acute bronchitis     Chronic bronchitis (H)     Chronic rhinitis     Mixed incontinence     Urgency-frequency syndrome     Hypothyroidism, unspecified type     Myalgia of pelvic floor     Pelvic floor dysfunction     Fecal smearing     Personal history of urinary tract infection     No past surgical history on file.    Social History     Tobacco Use     Smoking status: Never Smoker     Smokeless tobacco: Never Used   Substance Use Topics     Alcohol use: No     Alcohol/week: 0.0 oz     No family history on file.      Current Outpatient Medications   Medication Sig Dispense Refill     Acetaminophen (TYLENOL PO) Take 325 mg by mouth every 4 hours as needed for mild pain or fever       Calcium Citrate-Vitamin D (CALCIUM + D PO) Take by mouth daily       celecoxib (CELEBREX) 100 MG capsule Take 1 capsule (100 mg) by mouth 2 times daily 180 capsule 1     chlorthalidone (HYGROTON) 25 MG tablet TAKE 1/2 TABLET(12.5 MG) BY MOUTH DAILY 45 tablet 1     levothyroxine (SYNTHROID/LEVOTHROID) 112 MCG tablet Take 1 tablet (112 mcg) by mouth daily 90 tablet 1     losartan (COZAAR) 100 MG tablet Take 1 tablet (100 mg) by mouth daily 90 tablet 1     montelukast (SINGULAIR) 10 MG tablet TAKE 1 TABLET(10 MG) BY MOUTH AT BEDTIME 30 tablet 5     Multiple Vitamins-Minerals (PRESERVISION AREDS PO) Take by mouth  "daily       PROAIR RESPICLICK 108 (90 Base) MCG/ACT inhaler INHALE 1 PUFF INTO THE LUNGS EVERY 6 HOURS AS NEEDED FOR SHORTNESS OF BREATH OR DIFFICULT BREATHING OR WHEEZING 1 Inhaler 3     simvastatin (ZOCOR) 20 MG tablet Take 1 tablet (20 mg) by mouth At Bedtime 90 tablet 1     No Known Allergies  Recent Labs   Lab Test 01/09/19  1043 01/11/18  1405 01/23/17  1021 12/15/14   *  --  95 109   HDL 65  --  66 57   TRIG 214*  --  143 152   ALT 36 23 22 37   CR 0.68 0.55 0.75 0.64   GFRESTIMATED >90 >90 78 >60   GFRESTBLACK >90 >90 >90   GFR Calc   >60   POTASSIUM 3.5 3.6 3.7 3.9   TSH 0.01* 0.01* 0.02* <0.015      BP Readings from Last 3 Encounters:   07/16/19 128/84   01/16/19 128/74   01/15/19 133/85    Wt Readings from Last 3 Encounters:   07/16/19 66.7 kg (147 lb)   01/16/19 67.6 kg (149 lb)   01/15/19 67.9 kg (149 lb 12.8 oz)           Reviewed and updated as needed this visit by Provider         Review of Systems   ROS COMP: Constitutional, HEENT, cardiovascular, pulmonary, gi and gu systems are negative, except as otherwise noted.      Objective    /84 (Cuff Size: Adult Regular)   Pulse 56   Temp 96.6  F (35.9  C) (Tympanic)   Resp 14   Ht 1.6 m (5' 3\")   Wt 66.7 kg (147 lb)   SpO2 97%   BMI 26.04 kg/m    Body mass index is 26.04 kg/m .  Physical Exam   GENERAL: healthy, alert and no distress  NECK: no adenopathy, no asymmetry, masses, or scars and thyroid normal to palpation  RESP: lungs clear to auscultation - no rales, rhonchi or wheezes  CV: regular rate and rhythm, normal S1 S2, no S3 or S4, no murmur, click or rub, no peripheral edema and peripheral pulses strong  ABDOMEN: soft, nontender, no hepatosplenomegaly, no masses and bowel sounds normal  MS: no gross musculoskeletal defects noted, no edema        Assessment & Plan       ICD-10-CM    1. Osteoarthritis of spine with radiculopathy, cervical region M47.22    2. Lumbar spondylosis M47.816    3. Other osteoporosis, " "unspecified pathological fracture presence M81.8    4. Lumbar disc disease with radiculopathy M51.16       has been stable with current work excuse, has no additional injury, has no current radiculopathy  Will keep monitoring sx with work limitation for next 6 months and re-evaluate    BMI:   Estimated body mass index is 26.04 kg/m  as calculated from the following:    Height as of this encounter: 1.6 m (5' 3\").    Weight as of this encounter: 66.7 kg (147 lb).           FUTURE APPOINTMENTS:       - Follow-up visit in 1 week for high BP and cough     No follow-ups on file.    Akhil Almodovar MD  [facility] [place]        "

## 2019-07-16 NOTE — LETTER
[address] [address]  Phone: [phone]    July 16, 2019        Idalia Howe  [address] [place] [address]          To whom it may concern:    RE: Idalia Friedman has been working with current restrictive status since last time. She has worked with repetitive movement including bending back and neck/squatting legs/stretching shoulders and arms/frequent rotation of back and neck related with current position at work. 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 So, I recommend her that she should maintain the level of activity in carrying and level lifting as noted below. Then, we will reassess the functional status in 6 months around January 15th, 2020 or earlier as needed.       - May work up to 8 hours per day.  - Sitting - standard chair but stretching and positional changes as needed   - Standing and walking - stretching or resting 10 minutes every 30 minutes   - Carrying and level lifting - less than 10 lbs, occasionally(less than 50% of the day's shift). For example, at '[employer]-Down' at her work, she cannot perform more than five trays per one session per day, and not more than three sessions per day    - Bending and lifting - may lift up to 15 lbs on occasional basis, up to 5 times per hour   - Pushing and pulling - less than 15 lbs at height between waist and chest and without bending forward       Please contact me for questions or concerns.    Sincerely,        Akhil Almodovar MD

## 2019-07-17 ASSESSMENT — ASTHMA QUESTIONNAIRES: ACT_TOTALSCORE: 19

## 2019-07-19 DIAGNOSIS — M47.816 LUMBAR SPONDYLOSIS: ICD-10-CM

## 2019-07-19 DIAGNOSIS — M47.22 OSTEOARTHRITIS OF SPINE WITH RADICULOPATHY, CERVICAL REGION: ICD-10-CM

## 2019-07-19 DIAGNOSIS — M51.16 LUMBAR DISC DISEASE WITH RADICULOPATHY: ICD-10-CM

## 2019-07-19 NOTE — TELEPHONE ENCOUNTER
"Requested Prescriptions   Pending Prescriptions Disp Refills     celecoxib (CELEBREX) 100 MG capsule [Pharmacy Med Name: CELECOXIB 100MG CAPSULES] 180 capsule 0     Sig: TAKE 1 CAPSULE(100 MG) BY MOUTH TWICE DAILY  Last Written Prescription Date:  1/15/19  Last Fill Quantity: 180,  # refills: 1   Last office visit: 7/16/2019 with prescribing provider:  Scooby   Future Office Visit:   Next 5 appointments (look out 90 days)    Jul 22, 2019 11:30 AM CDT  Office Visit with Akhil Almodovar MD, [employer]  [facility] [place] ([address] [address]  [phone]                NSAID Medications Failed - 7/19/2019  3:10 AM        Failed - Patient is age 6-64 years        Passed - Blood pressure under 140/90 in past 12 months     BP Readings from Last 3 Encounters:   07/16/19 128/84   01/16/19 128/74   01/15/19 133/85                 Passed - Normal ALT on file in past 12 months     Recent Labs   Lab Test 01/09/19  1043   ALT 36             Passed - Normal AST on file in past 12 months     Recent Labs   Lab Test 01/09/19  1043   AST 17             Passed - Recent (12 mo) or future (30 days) visit within the authorizing provider's specialty     Patient had office visit in the last 12 months or has a visit in the next 30 days with authorizing provider or within the authorizing provider's specialty.  See \"Patient Info\" tab in inbasket, or \"Choose Columns\" in Meds & Orders section of the refill encounter.              Passed - Normal CBC on file in past 12 months     Recent Labs   Lab Test 01/09/19  1043   WBC 8.8   RBC 4.98   HGB 14.5   HCT 43.4                    Passed - Medication is active on med list        Passed - No active pregnancy on record        Passed - Normal serum creatinine on file in past 12 months     Recent Labs   Lab Test 01/09/19  1043   CR 0.68             Passed - No positive pregnancy test in past 12 months    "

## 2019-07-22 ENCOUNTER — OFFICE VISIT (OUTPATIENT)
Dept: FAMILY MEDICINE | Facility: CLINIC | Age: 64
End: 2019-07-22
Payer: COMMERCIAL

## 2019-07-22 VITALS
WEIGHT: 149 LBS | SYSTOLIC BLOOD PRESSURE: 100 MMHG | DIASTOLIC BLOOD PRESSURE: 72 MMHG | BODY MASS INDEX: 26.4 KG/M2 | TEMPERATURE: 95.2 F | HEIGHT: 63 IN | HEART RATE: 54 BPM | OXYGEN SATURATION: 98 %

## 2019-07-22 DIAGNOSIS — E03.9 HYPOTHYROIDISM, UNSPECIFIED TYPE: ICD-10-CM

## 2019-07-22 DIAGNOSIS — N30.90 BLADDER INFECTION: ICD-10-CM

## 2019-07-22 DIAGNOSIS — E89.0 POSTOPERATIVE HYPOTHYROIDISM: ICD-10-CM

## 2019-07-22 DIAGNOSIS — R73.09 ELEVATED GLUCOSE: ICD-10-CM

## 2019-07-22 DIAGNOSIS — E78.5 HYPERLIPIDEMIA LDL GOAL <160: ICD-10-CM

## 2019-07-22 DIAGNOSIS — R05.9 COUGH: ICD-10-CM

## 2019-07-22 DIAGNOSIS — J32.9 PURULENT POSTNASAL DRAINAGE: ICD-10-CM

## 2019-07-22 DIAGNOSIS — Z12.4 SCREENING FOR MALIGNANT NEOPLASM OF CERVIX: ICD-10-CM

## 2019-07-22 DIAGNOSIS — I10 BENIGN ESSENTIAL HYPERTENSION: Primary | ICD-10-CM

## 2019-07-22 DIAGNOSIS — R30.0 DYSURIA: ICD-10-CM

## 2019-07-22 LAB
ALBUMIN UR-MCNC: NEGATIVE MG/DL
APPEARANCE UR: CLEAR
BILIRUB UR QL STRIP: NEGATIVE
COLOR UR AUTO: YELLOW
GLUCOSE UR STRIP-MCNC: NEGATIVE MG/DL
HBA1C MFR BLD: 5.7 % (ref 0–5.6)
HGB UR QL STRIP: NEGATIVE
KETONES UR STRIP-MCNC: NEGATIVE MG/DL
LEUKOCYTE ESTERASE UR QL STRIP: ABNORMAL
NITRATE UR QL: NEGATIVE
NON-SQ EPI CELLS #/AREA URNS LPF: NORMAL /LPF
PH UR STRIP: 7 PH (ref 5–7)
RBC #/AREA URNS AUTO: NORMAL /HPF
SOURCE: ABNORMAL
SP GR UR STRIP: 1.02 (ref 1–1.03)
UROBILINOGEN UR STRIP-ACNC: 0.2 EU/DL (ref 0.2–1)
WBC #/AREA URNS AUTO: NORMAL /HPF

## 2019-07-22 PROCEDURE — 83036 HEMOGLOBIN GLYCOSYLATED A1C: CPT | Performed by: FAMILY MEDICINE

## 2019-07-22 PROCEDURE — 36415 COLL VENOUS BLD VENIPUNCTURE: CPT | Performed by: FAMILY MEDICINE

## 2019-07-22 PROCEDURE — 99214 OFFICE O/P EST MOD 30 MIN: CPT | Performed by: FAMILY MEDICINE

## 2019-07-22 PROCEDURE — 81001 URINALYSIS AUTO W/SCOPE: CPT | Performed by: FAMILY MEDICINE

## 2019-07-22 PROCEDURE — 80053 COMPREHEN METABOLIC PANEL: CPT | Performed by: FAMILY MEDICINE

## 2019-07-22 PROCEDURE — 84478 ASSAY OF TRIGLYCERIDES: CPT | Performed by: FAMILY MEDICINE

## 2019-07-22 RX ORDER — SIMVASTATIN 20 MG
20 TABLET ORAL AT BEDTIME
Qty: 90 TABLET | Refills: 3 | Status: SHIPPED | OUTPATIENT
Start: 2019-07-22 | End: 2020-07-14

## 2019-07-22 RX ORDER — BENZONATATE 100 MG/1
100 CAPSULE ORAL 2 TIMES DAILY PRN
Qty: 20 CAPSULE | Refills: 0 | Status: SHIPPED | OUTPATIENT
Start: 2019-07-22 | End: 2020-02-03

## 2019-07-22 RX ORDER — CHLORTHALIDONE 25 MG/1
TABLET ORAL
Qty: 45 TABLET | Refills: 3 | Status: SHIPPED | OUTPATIENT
Start: 2019-07-22 | End: 2020-08-10

## 2019-07-22 RX ORDER — LOSARTAN POTASSIUM 100 MG/1
100 TABLET ORAL DAILY
Qty: 90 TABLET | Refills: 3 | Status: SHIPPED | OUTPATIENT
Start: 2019-07-22 | End: 2020-07-16

## 2019-07-22 RX ORDER — CEPHALEXIN 500 MG/1
500 CAPSULE ORAL 2 TIMES DAILY
Qty: 14 CAPSULE | Refills: 0 | Status: SHIPPED | OUTPATIENT
Start: 2019-07-22 | End: 2020-01-28

## 2019-07-22 RX ORDER — LEVOTHYROXINE SODIUM 112 UG/1
112 TABLET ORAL DAILY
Qty: 90 TABLET | Refills: 3 | Status: SHIPPED | OUTPATIENT
Start: 2019-07-22 | End: 2020-02-04

## 2019-07-22 RX ORDER — MONTELUKAST SODIUM 10 MG/1
TABLET ORAL
Qty: 90 TABLET | Refills: 3 | Status: SHIPPED | OUTPATIENT
Start: 2019-07-22 | End: 2020-07-16

## 2019-07-22 RX ORDER — CELECOXIB 100 MG/1
CAPSULE ORAL
Qty: 180 CAPSULE | Refills: 1 | Status: SHIPPED | OUTPATIENT
Start: 2019-07-22 | End: 2023-03-23

## 2019-07-22 ASSESSMENT — MIFFLIN-ST. JEOR: SCORE: 1189.99

## 2019-07-22 NOTE — TELEPHONE ENCOUNTER
Failed age protocol.    Halima Lewis,RN BSN  [facility]  [phone]

## 2019-07-22 NOTE — PROGRESS NOTES
Subjective     Idalia Howe is a 63 year old female who presents to clinic today for the following health issues:    HPI   Followup of on Hypertension      Taking Medication as prescribed: yes    Side Effects:  None    Medication Helping Symptoms:  yes     URINARY TRACT SYMPTOMS      Duration: close to a month some side pain with it as well    Description  frequency    Intensity:  moderate    Accompanying signs and symptoms:  Fever/chills: no   Flank pain no   Nausea and vomiting: no   Vaginal symptoms: pt thinks that the urine has a smell to it.   Abdominal/Pelvic Pain: YES, just uncomfortable feeling not painful..    History  History of frequent UTI's: YES  History of kidney stones: no   Sexually Active: no   Possibility of pregnancy: No    Precipitating or alleviating factors: None    Therapies tried and outcome: none        Patient Active Problem List   Diagnosis     Hyperlipidemia LDL goal <160     Lumbar spondylosis     Lumbar disc disease with radiculopathy     DJD (degenerative joint disease) of cervical spine     Malignant neoplasm of thyroid gland (H)     Nonspecific reaction to tuberculin test     Cyst of ovary     History of malignant neoplasm of thyroid     Postoperative hypothyroidism     Acute bronchitis     Chronic bronchitis (H)     Chronic rhinitis     Mixed incontinence     Urgency-frequency syndrome     Hypothyroidism, unspecified type     Myalgia of pelvic floor     Pelvic floor dysfunction     Fecal smearing     Personal history of urinary tract infection     No past surgical history on file.    Social History     Tobacco Use     Smoking status: Never Smoker     Smokeless tobacco: Never Used   Substance Use Topics     Alcohol use: No     Alcohol/week: 0.0 oz     No family history on file.      Current Outpatient Medications   Medication Sig Dispense Refill     Acetaminophen (TYLENOL PO) Take 325 mg by mouth every 4 hours as needed for mild pain or fever       benzonatate (TESSALON) 100 MG  capsule Take 1 capsule (100 mg) by mouth 2 times daily as needed for cough 20 capsule 0     Calcium Citrate-Vitamin D (CALCIUM + D PO) Take by mouth daily       celecoxib (CELEBREX) 100 MG capsule TAKE 1 CAPSULE(100 MG) BY MOUTH TWICE DAILY 180 capsule 1     cephALEXin (KEFLEX) 500 MG capsule Take 1 capsule (500 mg) by mouth 2 times daily 14 capsule 0     chlorthalidone (HYGROTON) 25 MG tablet TAKE 1/2 TABLET(12.5 MG) BY MOUTH DAILY 45 tablet 3     levothyroxine (SYNTHROID/LEVOTHROID) 112 MCG tablet Take 1 tablet (112 mcg) by mouth daily 90 tablet 3     losartan (COZAAR) 100 MG tablet Take 1 tablet (100 mg) by mouth daily 90 tablet 3     montelukast (SINGULAIR) 10 MG tablet TAKE 1 TABLET(10 MG) BY MOUTH AT BEDTIME 90 tablet 3     Multiple Vitamins-Minerals (PRESERVISION AREDS PO) Take by mouth daily       PROAIR RESPICLICK 108 (90 Base) MCG/ACT inhaler INHALE 1 PUFF INTO THE LUNGS EVERY 6 HOURS AS NEEDED FOR SHORTNESS OF BREATH OR DIFFICULT BREATHING OR WHEEZING 1 Inhaler 3     simvastatin (ZOCOR) 20 MG tablet Take 1 tablet (20 mg) by mouth At Bedtime 90 tablet 3     No Known Allergies  Recent Labs   Lab Test 01/09/19  1043 01/11/18  1405 01/23/17  1021 12/15/14   *  --  95 109   HDL 65  --  66 57   TRIG 214*  --  143 152   ALT 36 23 22 37   CR 0.68 0.55 0.75 0.64   GFRESTIMATED >90 >90 78 >60   GFRESTBLACK >90 >90 >90   GFR Calc   >60   POTASSIUM 3.5 3.6 3.7 3.9   TSH 0.01* 0.01* 0.02* <0.015      BP Readings from Last 3 Encounters:   07/22/19 100/72   07/16/19 128/84   01/16/19 128/74    Wt Readings from Last 3 Encounters:   07/22/19 67.6 kg (149 lb)   07/16/19 66.7 kg (147 lb)   01/16/19 67.6 kg (149 lb)                    Reviewed and updated as needed this visit by Provider         Review of Systems   ROS COMP: Constitutional, HEENT, cardiovascular, pulmonary, gi and gu systems are negative, except as otherwise noted.      Objective    /72 (BP Location: Right arm, Patient Position:  "Chair, Cuff Size: Adult Regular)   Pulse 54   Temp 95.2  F (35.1  C) (Tympanic)   Ht 1.6 m (5' 3\")   Wt 67.6 kg (149 lb)   SpO2 98%   BMI 26.39 kg/m    Body mass index is 26.39 kg/m .  Physical Exam   GENERAL: healthy, alert and no distress  NECK: no adenopathy, no asymmetry, masses, or scars and thyroid normal to palpation  RESP: lungs clear to auscultation - no rales, rhonchi or wheezes  CV: regular rate and rhythm, normal S1 S2, no S3 or S4, no murmur, click or rub, no peripheral edema and peripheral pulses strong  ABDOMEN: soft, nontender, no hepatosplenomegaly, no masses and bowel sounds normal  MS: no gross musculoskeletal defects noted, no edema            Assessment & Plan     1. Screening for malignant neoplasm of cervix      2. Dysuria    - *UA reflex to Microscopic and Culture ([facility] (except [place] and [place])    3. Hyperlipidemia LDL goal <160  Has been stable with current dose of meds, has no side effect from the meds, will keep watching sx   - Comprehensive metabolic panel  - Triglycerides  - chlorthalidone (HYGROTON) 25 MG tablet; TAKE 1/2 TABLET(12.5 MG) BY MOUTH DAILY  Dispense: 45 tablet; Refill: 3  - losartan (COZAAR) 100 MG tablet; Take 1 tablet (100 mg) by mouth daily  Dispense: 90 tablet; Refill: 3  - simvastatin (ZOCOR) 20 MG tablet; Take 1 tablet (20 mg) by mouth At Bedtime  Dispense: 90 tablet; Refill: 3    4. Benign essential hypertension  Stable with current dose of meds, has no clinical sx of worsening  Will keep monitoring   - Comprehensive metabolic panel  - Triglycerides  - chlorthalidone (HYGROTON) 25 MG tablet; TAKE 1/2 TABLET(12.5 MG) BY MOUTH DAILY  Dispense: 45 tablet; Refill: 3  - losartan (COZAAR) 100 MG tablet; Take 1 tablet (100 mg) by mouth daily  Dispense: 90 tablet; Refill: 3  - simvastatin (ZOCOR) 20 MG tablet; Take 1 tablet (20 mg) by mouth At Bedtime  Dispense: 90 tablet; Refill: 3  - Urine Microscopic    5. Hypothyroidism, unspecified " "type  Stable   - chlorthalidone (HYGROTON) 25 MG tablet; TAKE 1/2 TABLET(12.5 MG) BY MOUTH DAILY  Dispense: 45 tablet; Refill: 3  - losartan (COZAAR) 100 MG tablet; Take 1 tablet (100 mg) by mouth daily  Dispense: 90 tablet; Refill: 3  - simvastatin (ZOCOR) 20 MG tablet; Take 1 tablet (20 mg) by mouth At Bedtime  Dispense: 90 tablet; Refill: 3    6. Postoperative hypothyroidism  Mentioned above   - levothyroxine (SYNTHROID/LEVOTHROID) 112 MCG tablet; Take 1 tablet (112 mcg) by mouth daily  Dispense: 90 tablet; Refill: 3    7. Purulent postnasal drainage  Stable   - montelukast (SINGULAIR) 10 MG tablet; TAKE 1 TABLET(10 MG) BY MOUTH AT BEDTIME  Dispense: 90 tablet; Refill: 3    8. Elevated glucose  Has been worsening on life style modification   Will keep monitoring   - Comprehensive metabolic panel  - Hemoglobin A1c    9. Cough    - benzonatate (TESSALON) 100 MG capsule; Take 1 capsule (100 mg) by mouth 2 times daily as needed for cough  Dispense: 20 capsule; Refill: 0    10. Bladder infection  Has sign of infection on ua, will have her to try abx   - cephALEXin (KEFLEX) 500 MG capsule; Take 1 capsule (500 mg) by mouth 2 times daily  Dispense: 14 capsule; Refill: 0     BMI:   Estimated body mass index is 26.39 kg/m  as calculated from the following:    Height as of this encounter: 1.6 m (5' 3\").    Weight as of this encounter: 67.6 kg (149 lb).           FUTURE APPOINTMENTS:       - Follow-up visit in 6 months for CPE    No follow-ups on file.    Akhil Almodovar MD  [facility] [place]    "

## 2019-07-23 LAB
ALBUMIN SERPL-MCNC: 4 G/DL (ref 3.4–5)
ALP SERPL-CCNC: 70 U/L (ref 40–150)
ALT SERPL W P-5'-P-CCNC: 36 U/L (ref 0–50)
ANION GAP SERPL CALCULATED.3IONS-SCNC: 10 MMOL/L (ref 3–14)
AST SERPL W P-5'-P-CCNC: 24 U/L (ref 0–45)
BILIRUB SERPL-MCNC: 0.5 MG/DL (ref 0.2–1.3)
BUN SERPL-MCNC: 17 MG/DL (ref 7–30)
CALCIUM SERPL-MCNC: 8.3 MG/DL (ref 8.5–10.1)
CHLORIDE SERPL-SCNC: 108 MMOL/L (ref 94–109)
CO2 SERPL-SCNC: 25 MMOL/L (ref 20–32)
CREAT SERPL-MCNC: 0.63 MG/DL (ref 0.52–1.04)
GFR SERPL CREATININE-BSD FRML MDRD: >90 ML/MIN/{1.73_M2}
GLUCOSE SERPL-MCNC: 110 MG/DL (ref 70–99)
POTASSIUM SERPL-SCNC: 3.5 MMOL/L (ref 3.4–5.3)
PROT SERPL-MCNC: 7.8 G/DL (ref 6.8–8.8)
SODIUM SERPL-SCNC: 143 MMOL/L (ref 133–144)
TRIGL SERPL-MCNC: 159 MG/DL

## 2020-01-28 ENCOUNTER — OFFICE VISIT (OUTPATIENT)
Dept: FAMILY MEDICINE | Facility: CLINIC | Age: 65
End: 2020-01-28
Payer: COMMERCIAL

## 2020-01-28 VITALS
RESPIRATION RATE: 16 BRPM | BODY MASS INDEX: 24.27 KG/M2 | WEIGHT: 151 LBS | HEIGHT: 66 IN | HEART RATE: 80 BPM | DIASTOLIC BLOOD PRESSURE: 78 MMHG | OXYGEN SATURATION: 97 % | TEMPERATURE: 96.3 F | SYSTOLIC BLOOD PRESSURE: 120 MMHG

## 2020-01-28 DIAGNOSIS — M51.16 LUMBAR DISC DISEASE WITH RADICULOPATHY: ICD-10-CM

## 2020-01-28 DIAGNOSIS — M47.22 OSTEOARTHRITIS OF SPINE WITH RADICULOPATHY, CERVICAL REGION: Primary | ICD-10-CM

## 2020-01-28 PROCEDURE — 99213 OFFICE O/P EST LOW 20 MIN: CPT | Performed by: FAMILY MEDICINE

## 2020-01-28 ASSESSMENT — MIFFLIN-ST. JEOR: SCORE: 1241.68

## 2020-01-28 NOTE — PROGRESS NOTES
Subjective     Idalia Howe is a 64 year old female who presents to clinic today for the following health issues:    HPI   Musculoskeletal problem/pain follow-up        Description  Location: upper back pain. No improvement       Patient Active Problem List   Diagnosis     Hyperlipidemia LDL goal <160     Lumbar spondylosis     Lumbar disc disease with radiculopathy     DJD (degenerative joint disease) of cervical spine     Malignant neoplasm of thyroid gland (H)     Nonspecific reaction to tuberculin test     Cyst of ovary     History of malignant neoplasm of thyroid     Postoperative hypothyroidism     Acute bronchitis     Chronic bronchitis (H)     Chronic rhinitis     Mixed incontinence     Urgency-frequency syndrome     Hypothyroidism, unspecified type     Myalgia of pelvic floor     Pelvic floor dysfunction     Fecal smearing     Personal history of urinary tract infection     No past surgical history on file.    Social History     Tobacco Use     Smoking status: Never Smoker     Smokeless tobacco: Never Used   Substance Use Topics     Alcohol use: No     Alcohol/week: 0.0 standard drinks     No family history on file.      Current Outpatient Medications   Medication Sig Dispense Refill     Acetaminophen (TYLENOL PO) Take 325 mg by mouth every 4 hours as needed for mild pain or fever       Calcium Citrate-Vitamin D (CALCIUM + D PO) Take by mouth daily       celecoxib (CELEBREX) 100 MG capsule TAKE 1 CAPSULE(100 MG) BY MOUTH TWICE DAILY 180 capsule 1     chlorthalidone (HYGROTON) 25 MG tablet TAKE 1/2 TABLET(12.5 MG) BY MOUTH DAILY 45 tablet 3     levothyroxine (SYNTHROID/LEVOTHROID) 112 MCG tablet Take 1 tablet (112 mcg) by mouth daily 90 tablet 3     losartan (COZAAR) 100 MG tablet Take 1 tablet (100 mg) by mouth daily 90 tablet 3     montelukast (SINGULAIR) 10 MG tablet TAKE 1 TABLET(10 MG) BY MOUTH AT BEDTIME 90 tablet 3     Multiple Vitamins-Minerals (PRESERVISION AREDS PO) Take by mouth daily       PROAIR  "RESPICLICK 108 (90 Base) MCG/ACT inhaler INHALE 1 PUFF INTO THE LUNGS EVERY 6 HOURS AS NEEDED FOR SHORTNESS OF BREATH OR DIFFICULT BREATHING OR WHEEZING 1 Inhaler 3     simvastatin (ZOCOR) 20 MG tablet Take 1 tablet (20 mg) by mouth At Bedtime 90 tablet 3     benzonatate (TESSALON) 100 MG capsule Take 1 capsule (100 mg) by mouth 2 times daily as needed for cough (Patient not taking: Reported on 1/28/2020) 20 capsule 0     No Known Allergies  Recent Labs   Lab Test 07/22/19  1230 01/09/19  1043 01/11/18  1405 01/23/17  1021 12/15/14   A1C 5.7*  --   --   --   --    LDL  --  107*  --  95 109   HDL  --  65  --  66 57   TRIG 159* 214*  --  143 152   ALT 36 36 23 22 37   CR 0.63 0.68 0.55 0.75 0.64   GFRESTIMATED >90 >90 >90 78 >60   GFRESTBLACK >90 >90 >90 >90   GFR Calc   >60   POTASSIUM 3.5 3.5 3.6 3.7 3.9   TSH  --  0.01* 0.01* 0.02* <0.015      BP Readings from Last 3 Encounters:   01/28/20 120/78   07/22/19 100/72   07/16/19 128/84    Wt Readings from Last 3 Encounters:   01/28/20 68.5 kg (151 lb)   07/22/19 67.6 kg (149 lb)   07/16/19 66.7 kg (147 lb)           Reviewed and updated as needed this visit by Provider         Review of Systems   ROS COMP: Constitutional, HEENT, cardiovascular, pulmonary, gi and gu systems are negative, except as otherwise noted.      Objective    /78 (Cuff Size: Adult Regular)   Pulse 80   Temp 96.3  F (35.7  C) (Tympanic)   Resp 16   Ht 1.676 m (5' 6\")   Wt 68.5 kg (151 lb)   SpO2 97%   BMI 24.37 kg/m    Body mass index is 24.37 kg/m .  Physical Exam   GENERAL: healthy, alert and no distress  EYES: Eyes grossly normal to inspection, PERRL and conjunctivae and sclerae normal  HENT: ear canals and TM's normal, nose and mouth without ulcers or lesions  NECK: no adenopathy, no asymmetry, masses, or scars and thyroid normal to palpation  RESP: lungs clear to auscultation - no rales, rhonchi or wheezes  CV: regular rate and rhythm, normal S1 S2, no S3 or S4, no " murmur, click or rub, no peripheral edema and peripheral pulses strong  ABDOMEN: soft, nontender, no hepatosplenomegaly, no masses and bowel sounds normal  MS: no gross musculoskeletal defects noted, no edema  SKIN: no suspicious lesions or rashes  NEURO: Normal strength and tone, mentation intact and speech normal  BACK: no CVA tenderness, no paralumbar tenderness            Assessment & Plan       ICD-10-CM    1. Osteoarthritis of spine with radiculopathy, cervical region M47.22    2. Lumbar disc disease with radiculopathy M51.16      Has been stable with current dose of meds, will have her to keep monitoring      FUTURE APPOINTMENTS:       - Follow-up visit in 6 months    No follow-ups on file.    Akhil Almodovar MD  [facility] [place]

## 2020-01-28 NOTE — LETTER
[address] [address]  Phone: [phone]    January 28, 2020        Idalia Howe  [address] [place] [address]          To whom it may concern:    RE: Idalia Friedman has been working with current restrictive status since last time. She has worked with repetitive movement including bending back and neck/squatting legs/stretching shoulders and arms/frequent rotation of back and neck related with current position at work. 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 So, I recommend her that she should maintain the level of activity in carrying and level lifting as noted below. Then, we will reassess the functional status in 6 months around June 28th, 2020 or earlier as needed.       - May work up to 8 hours per day.  - Sitting - standard chair but stretching and positional changes as needed   - Standing and walking - stretching or resting 10 minutes every 30 minutes   - Carrying and level lifting - less than 10 lbs, occasionally(less than 50% of the day's shift). For example, at '[employer]' at her work, she cannot perform more than five trays per one session per day, and not more than three sessions per day    - Bending and lifting - may lift up to 15 lbs on occasional basis, up to 5 times per hour   - Pushing and pulling - less than 15 lbs at height between waist and chest and without bending forward.       Please contact me for questions or concerns.     Sincerely,        Akhil Almodovar MD

## 2020-02-03 ENCOUNTER — OFFICE VISIT (OUTPATIENT)
Dept: FAMILY MEDICINE | Facility: CLINIC | Age: 65
End: 2020-02-03
Payer: COMMERCIAL

## 2020-02-03 VITALS
SYSTOLIC BLOOD PRESSURE: 110 MMHG | HEIGHT: 62 IN | RESPIRATION RATE: 12 BRPM | DIASTOLIC BLOOD PRESSURE: 80 MMHG | BODY MASS INDEX: 27.42 KG/M2 | WEIGHT: 149 LBS | OXYGEN SATURATION: 98 % | HEART RATE: 82 BPM

## 2020-02-03 DIAGNOSIS — R82.90 NONSPECIFIC FINDING ON EXAMINATION OF URINE: ICD-10-CM

## 2020-02-03 DIAGNOSIS — R07.9 CHEST PAIN, UNSPECIFIED TYPE: ICD-10-CM

## 2020-02-03 DIAGNOSIS — Z00.00 ROUTINE GENERAL MEDICAL EXAMINATION AT A HEALTH CARE FACILITY: Primary | ICD-10-CM

## 2020-02-03 DIAGNOSIS — R05.3 CHRONIC COUGH: ICD-10-CM

## 2020-02-03 DIAGNOSIS — R30.0 DYSURIA: ICD-10-CM

## 2020-02-03 DIAGNOSIS — E03.9 HYPOTHYROIDISM, UNSPECIFIED TYPE: ICD-10-CM

## 2020-02-03 DIAGNOSIS — N30.01 ACUTE CYSTITIS WITH HEMATURIA: ICD-10-CM

## 2020-02-03 DIAGNOSIS — R05.9 COUGH: ICD-10-CM

## 2020-02-03 DIAGNOSIS — E78.5 HYPERLIPIDEMIA LDL GOAL <160: ICD-10-CM

## 2020-02-03 LAB
ALBUMIN UR-MCNC: NEGATIVE MG/DL
APPEARANCE UR: ABNORMAL
BACTERIA #/AREA URNS HPF: ABNORMAL /HPF
BILIRUB UR QL STRIP: NEGATIVE
COLOR UR AUTO: YELLOW
ERYTHROCYTE [DISTWIDTH] IN BLOOD BY AUTOMATED COUNT: 13 % (ref 10–15)
GLUCOSE UR STRIP-MCNC: NEGATIVE MG/DL
HCT VFR BLD AUTO: 43.7 % (ref 35–47)
HGB BLD-MCNC: 14.5 G/DL (ref 11.7–15.7)
HGB UR QL STRIP: ABNORMAL
KETONES UR STRIP-MCNC: NEGATIVE MG/DL
LEUKOCYTE ESTERASE UR QL STRIP: ABNORMAL
MCH RBC QN AUTO: 28.7 PG (ref 26.5–33)
MCHC RBC AUTO-ENTMCNC: 33.2 G/DL (ref 31.5–36.5)
MCV RBC AUTO: 87 FL (ref 78–100)
NITRATE UR QL: POSITIVE
NON-SQ EPI CELLS #/AREA URNS LPF: ABNORMAL /LPF
PH UR STRIP: 6.5 PH (ref 5–7)
PLATELET # BLD AUTO: 380 10E9/L (ref 150–450)
RBC # BLD AUTO: 5.05 10E12/L (ref 3.8–5.2)
RBC #/AREA URNS AUTO: ABNORMAL /HPF
SOURCE: ABNORMAL
SP GR UR STRIP: 1.01 (ref 1–1.03)
UROBILINOGEN UR STRIP-ACNC: 0.2 EU/DL (ref 0.2–1)
WBC # BLD AUTO: 8.4 10E9/L (ref 4–11)
WBC #/AREA URNS AUTO: ABNORMAL /HPF

## 2020-02-03 PROCEDURE — 81001 URINALYSIS AUTO W/SCOPE: CPT | Performed by: FAMILY MEDICINE

## 2020-02-03 PROCEDURE — 90471 IMMUNIZATION ADMIN: CPT | Performed by: FAMILY MEDICINE

## 2020-02-03 PROCEDURE — 84443 ASSAY THYROID STIM HORMONE: CPT | Performed by: FAMILY MEDICINE

## 2020-02-03 PROCEDURE — 90662 IIV NO PRSV INCREASED AG IM: CPT | Performed by: FAMILY MEDICINE

## 2020-02-03 PROCEDURE — 87186 SC STD MICRODIL/AGAR DIL: CPT | Performed by: FAMILY MEDICINE

## 2020-02-03 PROCEDURE — 36415 COLL VENOUS BLD VENIPUNCTURE: CPT | Performed by: FAMILY MEDICINE

## 2020-02-03 PROCEDURE — 87088 URINE BACTERIA CULTURE: CPT | Performed by: FAMILY MEDICINE

## 2020-02-03 PROCEDURE — 87086 URINE CULTURE/COLONY COUNT: CPT | Performed by: FAMILY MEDICINE

## 2020-02-03 PROCEDURE — 80061 LIPID PANEL: CPT | Performed by: FAMILY MEDICINE

## 2020-02-03 PROCEDURE — 85027 COMPLETE CBC AUTOMATED: CPT | Performed by: FAMILY MEDICINE

## 2020-02-03 PROCEDURE — 84439 ASSAY OF FREE THYROXINE: CPT | Performed by: FAMILY MEDICINE

## 2020-02-03 PROCEDURE — 99213 OFFICE O/P EST LOW 20 MIN: CPT | Mod: 25 | Performed by: FAMILY MEDICINE

## 2020-02-03 PROCEDURE — 80053 COMPREHEN METABOLIC PANEL: CPT | Performed by: FAMILY MEDICINE

## 2020-02-03 PROCEDURE — 99397 PER PM REEVAL EST PAT 65+ YR: CPT | Mod: 25 | Performed by: FAMILY MEDICINE

## 2020-02-03 RX ORDER — SULFAMETHOXAZOLE/TRIMETHOPRIM 800-160 MG
1 TABLET ORAL 2 TIMES DAILY
Qty: 14 TABLET | Refills: 0 | Status: SHIPPED | OUTPATIENT
Start: 2020-02-03 | End: 2020-08-04

## 2020-02-03 RX ORDER — BENZONATATE 100 MG/1
100 CAPSULE ORAL 2 TIMES DAILY PRN
Qty: 20 CAPSULE | Refills: 3 | Status: SHIPPED | OUTPATIENT
Start: 2020-02-03 | End: 2022-01-11

## 2020-02-03 ASSESSMENT — ACTIVITIES OF DAILY LIVING (ADL): CURRENT_FUNCTION: NO ASSISTANCE NEEDED

## 2020-02-03 ASSESSMENT — MIFFLIN-ST. JEOR: SCORE: 1169.11

## 2020-02-03 NOTE — PROGRESS NOTES
"   SUBJECTIVE:   CC: Idalia Howe is an 64 year old woman who presents for preventive health visit.     Healthy Habits:    In general, how would you rate your overall health?  Good    Frequency of exercise:  None (Stand for work and walking at work)    Duration of exercise:  Less than 15 minutes    Do you usually eat at least 4 servings of fruit and vegetables a day, include whole grains    & fiber and avoid regularly eating high fat or \"junk\" foods?  Yes    Taking medications regularly:  Yes    Barriers to taking medications:  None    Medication side effects:  None    Ability to successfully perform activities of daily living:  No assistance needed    Home Safety:  No safety concerns identified    Hearing Impairment:  No hearing concerns    In the past 6 months, have you been bothered by leaking of urine? Yes (when coughing)    In general, how would you rate your overall mental or emotional health?  Excellent      PHQ-2 Total Score:    Additional concerns today:  Yes (Cough. Last Friday UTI and was told to follow up.)    Ability to successfully perform activities of daily living: Yes, no assistance needed  Home safety:  none identified   Hearing impairment: No    Today's PHQ-2 Score:   PHQ-2 ( 1999 Pf[employer]) 1/28/2020   Q1: Little interest or pleasure in doing things 0   Q2: Feeling down, depressed or hopeless 0   PHQ-2 Score 0       Abuse: Current or Past(Physical, Sexual or Emotional)- No  Do you feel safe in your environment? Yes        Social History     Tobacco Use     Smoking status: Never Smoker     Smokeless tobacco: Never Used   Substance Use Topics     Alcohol use: No     Alcohol/week: 0.0 standard drinks     If you drink alcohol do you typically have >3 drinks per day or >7 drinks per week? No    Alcohol Use 1/23/2017   Prescreen: >3 drinks/day or >7 drinks/week? The patient does not drink >3 drinks per day nor >7 drinks per week.       Reviewed orders with patient.  Reviewed health maintenance and updated " orders accordingly - Yes  BP Readings from Last 3 Encounters:   02/03/20 110/80   01/28/20 120/78   07/22/19 100/72    Wt Readings from Last 3 Encounters:   02/03/20 67.6 kg (149 lb)   01/28/20 68.5 kg (151 lb)   07/22/19 67.6 kg (149 lb)                  Patient Active Problem List   Diagnosis     Hyperlipidemia LDL goal <160     Lumbar spondylosis     Lumbar disc disease with radiculopathy     DJD (degenerative joint disease) of cervical spine     Malignant neoplasm of thyroid gland (H)     Nonspecific reaction to tuberculin test     Cyst of ovary     History of malignant neoplasm of thyroid     Postoperative hypothyroidism     Acute bronchitis     Chronic bronchitis (H)     Chronic rhinitis     Mixed incontinence     Urgency-frequency syndrome     Hypothyroidism, unspecified type     Myalgia of pelvic floor     Pelvic floor dysfunction     Fecal smearing     Personal history of urinary tract infection     No past surgical history on file.    Social History     Tobacco Use     Smoking status: Never Smoker     Smokeless tobacco: Never Used   Substance Use Topics     Alcohol use: No     Alcohol/week: 0.0 standard drinks     No family history on file.      Current Outpatient Medications   Medication Sig Dispense Refill     Acetaminophen (TYLENOL PO) Take 325 mg by mouth every 4 hours as needed for mild pain or fever       benzonatate (TESSALON) 100 MG capsule Take 1 capsule (100 mg) by mouth 2 times daily as needed for cough 20 capsule 3     Calcium Citrate-Vitamin D (CALCIUM + D PO) Take by mouth daily       celecoxib (CELEBREX) 100 MG capsule TAKE 1 CAPSULE(100 MG) BY MOUTH TWICE DAILY 180 capsule 1     levothyroxine (SYNTHROID/LEVOTHROID) 112 MCG tablet Take 1 tablet (112 mcg) by mouth daily 90 tablet 3     losartan (COZAAR) 100 MG tablet Take 1 tablet (100 mg) by mouth daily 90 tablet 3     montelukast (SINGULAIR) 10 MG tablet TAKE 1 TABLET(10 MG) BY MOUTH AT BEDTIME 90 tablet 3     Multiple Vitamins-Minerals  (PRESERVISION AREDS PO) Take by mouth daily       PROAIR RESPICLICK 108 (90 Base) MCG/ACT inhaler INHALE 1 PUFF INTO THE LUNGS EVERY 6 HOURS AS NEEDED FOR SHORTNESS OF BREATH OR DIFFICULT BREATHING OR WHEEZING 1 Inhaler 3     simvastatin (ZOCOR) 20 MG tablet Take 1 tablet (20 mg) by mouth At Bedtime 90 tablet 3     chlorthalidone (HYGROTON) 25 MG tablet TAKE 1/2 TABLET(12.5 MG) BY MOUTH DAILY 45 tablet 3     No Known Allergies  Recent Labs   Lab Test 07/22/19  1230 01/09/19  1043 01/11/18  1405 01/23/17  1021 12/15/14   A1C 5.7*  --   --   --   --    LDL  --  107*  --  95 109   HDL  --  65  --  66 57   TRIG 159* 214*  --  143 152   ALT 36 36 23 22 37   CR 0.63 0.68 0.55 0.75 0.64   GFRESTIMATED >90 >90 >90 78 >60   GFRESTBLACK >90 >90 >90 >90   GFR Calc   >60   POTASSIUM 3.5 3.5 3.6 3.7 3.9   TSH  --  0.01* 0.01* 0.02* <0.015        Mammogram Screening: Patient over age 50, mutual decision to screen reflected in health maintenance.    Pertinent mammograms are reviewed under the imaging tab.  History of abnormal Pap smear: NO - age 30-65 PAP every 5 years with negative HPV co-testing recommended     Reviewed and updated as needed this visit by clinical staff         Reviewed and updated as needed this visit by Provider        Past Medical History:   Diagnosis Date     Back pain      Hip pain      Neuropathic pain       No past surgical history on file.    Review of Systems  CONSTITUTIONAL: NEGATIVE for fever, chills, change in weight  INTEGUMENTARY/SKIN: NEGATIVE for worrisome rashes, moles or lesions  EYES: NEGATIVE for vision changes or irritation  ENT: NEGATIVE for ear, mouth and throat problems  RESP: NEGATIVE for significant cough or SOB  BREAST: NEGATIVE for masses, tenderness or discharge  CV: NEGATIVE for chest pain, palpitations or peripheral edema  GI: NEGATIVE for nausea, abdominal pain, heartburn, or change in bowel habits  : NEGATIVE for unusual urinary or vaginal symptoms. No vaginal  bleeding.  MUSCULOSKELETAL: NEGATIVE for significant arthralgias or myalgia  NEURO: NEGATIVE for weakness, dizziness or paresthesias  PSYCHIATRIC: NEGATIVE for changes in mood or affect      OBJECTIVE:   There were no vitals taken for this visit.  Physical Exam  GENERAL: healthy, alert and no distress  EYES: Eyes grossly normal to inspection, PERRL and conjunctivae and sclerae normal  HENT: ear canals and TM's normal, nose and mouth without ulcers or lesions  NECK: no adenopathy, no asymmetry, masses, or scars and thyroid normal to palpation  RESP: lungs clear to auscultation - no rales, rhonchi or wheezes  CV: regular rate and rhythm, normal S1 S2, no S3 or S4, no murmur, click or rub, no peripheral edema and peripheral pulses strong  ABDOMEN: soft, nontender, no hepatosplenomegaly, no masses and bowel sounds normal  MS: no gross musculoskeletal defects noted, no edema  SKIN: no suspicious lesions or rashes  NEURO: Normal strength and tone, mentation intact and speech normal  BACK: no CVA tenderness, no paralumbar tenderness  PSYCH: mentation appears normal, affect normal/bright  LYMPH: no cervical, supraclavicular, axillary, or inguinal adenopathy        ASSESSMENT/PLAN:   1. Routine general medical examination at a health care facility    - CBC with platelets  - Comprehensive metabolic panel  - Lipid panel reflex to direct LDL Fasting  - TSH with free T4 reflex    2. Hyperlipidemia LDL goal <160  Stable   - Comprehensive metabolic panel  - Lipid panel reflex to direct LDL Fasting  - TSH with free T4 reflex    3. Hypothyroidism, unspecified type  Stable   - TSH with free T4 reflex    4. Chronic cough  Has been having increased SOB and cough for last 2-3 years, will have her to see pulmonology for further evaluation   - PULMONARY MEDICINE REFERRAL  - OFFICE/OUTPT VISIT,EST,LEVL III    5. Chest pain, unspecified type  Has worsening chest pain with physical exertion, also has increased SOB  Will have her to have  "stress EKG for further evaluation   She has h/i mixed hyperlipidemia   - Stress MPI EKG  - OFFICE/OUTPT VISIT,EST,LEVL III    6. Cough  Mentioned above   - benzonatate (TESSALON) 100 MG capsule; Take 1 capsule (100 mg) by mouth 2 times daily as needed for cough  Dispense: 20 capsule; Refill: 3    7. Dysuria  Has worsening burning sensation and frequency at urination  Will review the lab and update pt   - UA with Microscopic reflex to Culture  - OFFICE/OUTPT VISIT,EST,LEVL III    COUNSELING:  Reviewed preventive health counseling, as reflected in patient instructions    Estimated body mass index is 27.25 kg/m  as calculated from the following:    Height as of this encounter: 1.575 m (5' 2\").    Weight as of this encounter: 67.6 kg (149 lb).         reports that she has never smoked. She has never used smokeless tobacco.      Counseling Resources:  ATP IV Guidelines  Pooled Cohorts Equation Calculator  Breast Cancer Risk Calculator  FRAX Risk Assessment  ICSI Preventive Guidelines  Dietary Guidelines for Americans, 2010  USDA's MyPlate  ASA Prophylaxis  Lung CA Screening    Akhil Almodovar MD  [facility] [place]        "

## 2020-02-03 NOTE — PATIENT INSTRUCTIONS
Preventive Health Recommendations    See your health care provider every year to    Review health changes.     Discuss preventive care.      Review your medicines if your doctor has prescribed any.      You no longer need a yearly Pap test unless you've had an abnormal Pap test in the past 10 years. If you have vaginal symptoms, such as bleeding or discharge, be sure to talk with your provider about a Pap test.      Every 1 to 2 years, have a mammogram.  If you are over 69, talk with your health care provider about whether or not you want to continue having screening mammograms.      Every 10 years, have a colonoscopy. Or, have a yearly FIT test (stool test). These exams will check for colon cancer.       Have a cholesterol test every 5 years, or more often if your doctor advises it.       Have a diabetes test (fasting glucose) every three years. If you are at risk for diabetes, you should have this test more often.       At age 65, have a bone density scan (DEXA) to check for osteoporosis (brittle bone disease).    Shots:    Get a flu shot each year.    Get a tetanus shot every 10 years.    Talk to your doctor about your pneumonia vaccines. There are now two you should receive - Pneumovax (PPSV 23) and Prevnar (PCV 13).    Talk to your pharmacist about the shingles vaccine.    Talk to your doctor about the hepatitis B vaccine.    Nutrition:     Eat at least 5 servings of fruits and vegetables each day.      Eat whole-grain bread, whole-wheat pasta and brown rice instead of white grains and rice.      Get adequate about Calcium and Vitamin D.     Lifestyle    Exercise at least 150 minutes a week (30 minutes a day, 5 days a week). This will help you control your weight and prevent disease.      Limit alcohol to one drink per day.      No smoking.       Wear sunscreen to prevent skin cancer.       See your dentist twice a year for an exam and cleaning.      See your eye doctor every 1 to 2 years to screen for  conditions such as glaucoma, macular degeneration, cataracts, etc.    Personalized Prevention Plan  You are due for the preventive services outlined below.  Your care team is available to assist you in scheduling these services.  If you have already completed any of these items, please share that information with your care team to update in your medical record.    Health Maintenance Due   Topic Date Due     Breathing Capacity Test  1955     Hepatitis C Screening  1955     COPD Action Plan  1955     Flu Vaccine (1) 09/01/2019     FALL RISK ASSESSMENT  01/15/2020     Pneumococcal Vaccine (1 of 2 - PCV13) 08/28/2019     Annual Wellness Visit  01/09/2020

## 2020-02-04 ENCOUNTER — TELEPHONE (OUTPATIENT)
Dept: FAMILY MEDICINE | Facility: CLINIC | Age: 65
End: 2020-02-04

## 2020-02-04 LAB
ALBUMIN SERPL-MCNC: 3.7 G/DL (ref 3.4–5)
ALP SERPL-CCNC: 71 U/L (ref 40–150)
ALT SERPL W P-5'-P-CCNC: 23 U/L (ref 0–50)
ANION GAP SERPL CALCULATED.3IONS-SCNC: 7 MMOL/L (ref 3–14)
AST SERPL W P-5'-P-CCNC: 15 U/L (ref 0–45)
BILIRUB SERPL-MCNC: 0.6 MG/DL (ref 0.2–1.3)
BUN SERPL-MCNC: 11 MG/DL (ref 7–30)
CALCIUM SERPL-MCNC: 9 MG/DL (ref 8.5–10.1)
CHLORIDE SERPL-SCNC: 107 MMOL/L (ref 94–109)
CHOLEST SERPL-MCNC: 205 MG/DL
CO2 SERPL-SCNC: 25 MMOL/L (ref 20–32)
CREAT SERPL-MCNC: 0.64 MG/DL (ref 0.52–1.04)
GFR SERPL CREATININE-BSD FRML MDRD: >90 ML/MIN/{1.73_M2}
GLUCOSE SERPL-MCNC: 113 MG/DL (ref 70–99)
HDLC SERPL-MCNC: 58 MG/DL
LDLC SERPL CALC-MCNC: 102 MG/DL
NONHDLC SERPL-MCNC: 147 MG/DL
POTASSIUM SERPL-SCNC: 3.6 MMOL/L (ref 3.4–5.3)
PROT SERPL-MCNC: 7.6 G/DL (ref 6.8–8.8)
SODIUM SERPL-SCNC: 139 MMOL/L (ref 133–144)
T4 FREE SERPL-MCNC: 1.97 NG/DL (ref 0.76–1.46)
TRIGL SERPL-MCNC: 227 MG/DL
TSH SERPL DL<=0.005 MIU/L-ACNC: <0.01 MU/L (ref 0.4–4)

## 2020-02-04 RX ORDER — LEVOTHYROXINE SODIUM 100 UG/1
100 TABLET ORAL DAILY
Qty: 90 TABLET | Refills: 1 | Status: SHIPPED | OUTPATIENT
Start: 2020-02-04 | End: 2020-07-29

## 2020-02-04 NOTE — LETTER
[address] [address]  Phone: [phone]    February 5, 2020        Idalia Howe  [address] [place] [address]          To whom it may concern:    RE: Idalia Howe    Patient was seen and treated at our clinic for her current health issue. We strongly recommend her to not going to cruise travel due to her current health condition and Corona virus outbreak on the region she is supposed to visit during the cruise travel.    Please contact me for questions or concerns.      Sincerely,        Akhil Almodovar MD

## 2020-02-05 LAB
BACTERIA SPEC CULT: ABNORMAL
SPECIMEN SOURCE: ABNORMAL

## 2020-02-05 NOTE — TELEPHONE ENCOUNTER
Reason for call:  Other   Patient called regarding (reason for call): call back  Additional comments: patient would like a doctors note for her chronic cough    Phone number to reach patient:  Other phone number:  [phone]    Best Time:  Anytime from 11-3p    Can we leave a detailed message on this number?  YES

## 2020-02-05 NOTE — TELEPHONE ENCOUNTER
Patient saw Dr Almodovar on 2/3.   He is recommending that she not go on as  cruise that she has booked through [employer] due to her persistent cough and the coronovirus in the  countries.     [employer] needs a letter that it is her doctors recommendations that she not take the cruise in order for her to cancel and be able to get a refund.      Please call almita Cao at [phone] if you have any other questions or to let him know once the letter has been written.      Halima Dumont

## 2020-02-05 NOTE — TELEPHONE ENCOUNTER
Need more information, is she wanting a note for work? Any specifics? Left non detailed message via   services #[phone] for patient to return call.     Kiah Conway RN   [facility] - Triage

## 2020-02-10 ENCOUNTER — TRANSFERRED RECORDS (OUTPATIENT)
Dept: HEALTH INFORMATION MANAGEMENT | Facility: CLINIC | Age: 65
End: 2020-02-10

## 2020-02-12 NOTE — TELEPHONE ENCOUNTER
Letter picked up by  on 02/06/20.    .Alona LAW    [facility] [place]

## 2020-07-13 DIAGNOSIS — E78.5 HYPERLIPIDEMIA LDL GOAL <160: ICD-10-CM

## 2020-07-13 DIAGNOSIS — E03.9 HYPOTHYROIDISM, UNSPECIFIED TYPE: ICD-10-CM

## 2020-07-13 DIAGNOSIS — I10 BENIGN ESSENTIAL HYPERTENSION: ICD-10-CM

## 2020-07-13 NOTE — LETTER
July 29, 2020      Idalia Howe  [address] [place] [address]        Dear Idalia,    I care about your health and have reviewed your health plan. I have reviewed your medical conditions, medication list, and lab results and am making recommendations based on this review, to better manage your health.    You are in particular need of attention regarding:  -Thyroid    I am recommending that you:  -schedule a FOLLOWUP OFFICE APPOINTMENT with me.       Here is a list of Health Maintenance topics that are due now or due soon:  Health Maintenance Due   Topic Date Due     Breathing Capacity Test  1955     Hepatitis C Screening  1955     COPD Action Plan  1955     Pneumococcal Vaccine (1 of 2 - PCV13) 08/28/2019     FALL RISK ASSESSMENT  01/15/2020       Please call us at [phone] (or use [employer]) to address the above recommendations.     Thank you for trusting [facility] and we appreciate the opportunity to serve you.  We look forward to supporting your healthcare needs in the future.    Healthy Regards,    Akhil Almodovar MD

## 2020-07-14 RX ORDER — SIMVASTATIN 20 MG
TABLET ORAL
Qty: 90 TABLET | Refills: 1 | Status: SHIPPED | OUTPATIENT
Start: 2020-07-14 | End: 2021-04-16

## 2020-07-14 NOTE — TELEPHONE ENCOUNTER
She is supposed to see me after adjusting dose of thyroid medicine, please help her to schedule virtual visit with me  marciano

## 2020-07-14 NOTE — TELEPHONE ENCOUNTER
Prescription approved per [facility] Refill Protocol for simvastatin.    Nell TINSLEY RN  EP Triage

## 2020-07-14 NOTE — TELEPHONE ENCOUNTER
Routing refill request to provider for review/approval because:  Labs out of range:  TSH <0.01 on 2/3/20    Nell TINSLEY RN  EP Triage

## 2020-07-14 NOTE — TELEPHONE ENCOUNTER
Left message for pt to call back: please help her to schedule virtual visit for a thyroid med check  Rosibel Benavides,

## 2020-07-16 DIAGNOSIS — E78.5 HYPERLIPIDEMIA LDL GOAL <160: ICD-10-CM

## 2020-07-16 DIAGNOSIS — J32.9 PURULENT POSTNASAL DRAINAGE: ICD-10-CM

## 2020-07-16 DIAGNOSIS — E89.0 POSTOPERATIVE HYPOTHYROIDISM: ICD-10-CM

## 2020-07-16 DIAGNOSIS — I10 BENIGN ESSENTIAL HYPERTENSION: ICD-10-CM

## 2020-07-16 DIAGNOSIS — E03.9 HYPOTHYROIDISM, UNSPECIFIED TYPE: ICD-10-CM

## 2020-07-16 RX ORDER — MONTELUKAST SODIUM 10 MG/1
TABLET ORAL
Qty: 90 TABLET | Refills: 0 | Status: SHIPPED | OUTPATIENT
Start: 2020-07-16 | End: 2020-10-08

## 2020-07-16 RX ORDER — LOSARTAN POTASSIUM 100 MG/1
TABLET ORAL
Qty: 90 TABLET | Refills: 0 | Status: SHIPPED | OUTPATIENT
Start: 2020-07-16 | End: 2020-10-08

## 2020-07-16 RX ORDER — LEVOTHYROXINE SODIUM 112 UG/1
TABLET ORAL
Qty: 90 TABLET | Refills: 3 | OUTPATIENT
Start: 2020-07-16

## 2020-07-16 NOTE — TELEPHONE ENCOUNTER
Rx for levothyroxine 112 mcg denied to pharmacy.  Pt is on levothyroxine 100 mcg since 2/4/20.    Prescription approved per [facility] Refill Protocol.    Nell TINSLEY RN  EP Triage

## 2020-07-29 RX ORDER — LEVOTHYROXINE SODIUM 100 UG/1
TABLET ORAL
Qty: 30 TABLET | Refills: 0 | Status: SHIPPED | OUTPATIENT
Start: 2020-07-29 | End: 2020-09-08

## 2020-08-04 ENCOUNTER — OFFICE VISIT (OUTPATIENT)
Dept: FAMILY MEDICINE | Facility: CLINIC | Age: 65
End: 2020-08-04
Payer: COMMERCIAL

## 2020-08-04 VITALS
RESPIRATION RATE: 11 BRPM | WEIGHT: 151.8 LBS | DIASTOLIC BLOOD PRESSURE: 64 MMHG | BODY MASS INDEX: 27.94 KG/M2 | HEIGHT: 62 IN | TEMPERATURE: 96.7 F | OXYGEN SATURATION: 98 % | HEART RATE: 61 BPM | SYSTOLIC BLOOD PRESSURE: 120 MMHG

## 2020-08-04 DIAGNOSIS — M47.22 OSTEOARTHRITIS OF SPINE WITH RADICULOPATHY, CERVICAL REGION: Primary | ICD-10-CM

## 2020-08-04 DIAGNOSIS — M51.16 LUMBAR DISC DISEASE WITH RADICULOPATHY: ICD-10-CM

## 2020-08-04 PROCEDURE — 99213 OFFICE O/P EST LOW 20 MIN: CPT | Performed by: FAMILY MEDICINE

## 2020-08-04 ASSESSMENT — MIFFLIN-ST. JEOR: SCORE: 1181.81

## 2020-08-04 NOTE — PROGRESS NOTES
Subjective     Idalia Howe is a 64 year old female who presents to clinic today for the following health issues:    HPI       Follow up on work comp. Provider has form to complete     Patient Active Problem List   Diagnosis     Hyperlipidemia LDL goal <160     Lumbar spondylosis     Lumbar disc disease with radiculopathy     DJD (degenerative joint disease) of cervical spine     Malignant neoplasm of thyroid gland (H)     Nonspecific reaction to tuberculin test     Cyst of ovary     History of malignant neoplasm of thyroid     Postoperative hypothyroidism     Acute bronchitis     Chronic bronchitis (H)     Chronic rhinitis     Mixed incontinence     Urgency-frequency syndrome     Hypothyroidism, unspecified type     Myalgia of pelvic floor     Pelvic floor dysfunction     Fecal smearing     Personal history of urinary tract infection     History reviewed. No pertinent surgical history.    Social History     Tobacco Use     Smoking status: Never Smoker     Smokeless tobacco: Never Used   Substance Use Topics     Alcohol use: Yes     Alcohol/week: 0.0 standard drinks     Comment: once-twice a month      History reviewed. No pertinent family history.      Current Outpatient Medications   Medication Sig Dispense Refill     Acetaminophen (TYLENOL PO) Take 325 mg by mouth every 4 hours as needed for mild pain or fever       Calcium Citrate-Vitamin D (CALCIUM + D PO) Take by mouth daily       celecoxib (CELEBREX) 100 MG capsule TAKE 1 CAPSULE(100 MG) BY MOUTH TWICE DAILY 180 capsule 1     chlorthalidone (HYGROTON) 25 MG tablet TAKE 1/2 TABLET(12.5 MG) BY MOUTH DAILY 45 tablet 3     levothyroxine (SYNTHROID/LEVOTHROID) 100 MCG tablet TAKE 1 TABLET(100 MCG) BY MOUTH DAILY 30 tablet 0     losartan (COZAAR) 100 MG tablet TAKE 1 TABLET(100 MG) BY MOUTH DAILY 90 tablet 0     PROAIR RESPICLICK 108 (90 Base) MCG/ACT inhaler INHALE 1 PUFF INTO THE LUNGS EVERY 6 HOURS AS NEEDED FOR SHORTNESS OF BREATH OR DIFFICULT BREATHING OR  "WHEEZING 1 Inhaler 3     simvastatin (ZOCOR) 20 MG tablet TAKE 1 TABLET(20 MG) BY MOUTH AT BEDTIME 90 tablet 1     benzonatate (TESSALON) 100 MG capsule Take 1 capsule (100 mg) by mouth 2 times daily as needed for cough (Patient not taking: Reported on 8/4/2020) 20 capsule 3     montelukast (SINGULAIR) 10 MG tablet TAKE 1 TABLET(10 MG) BY MOUTH AT BEDTIME (Patient not taking: Reported on 8/4/2020) 90 tablet 0     Multiple Vitamins-Minerals (PRESERVISION AREDS PO) Take by mouth daily       No Known Allergies  Recent Labs   Lab Test 02/03/20  1209 07/22/19  1230 01/09/19  1043  01/23/17  1021   A1C  --  5.7*  --   --   --    *  --  107*  --  95   HDL 58  --  65  --  66   TRIG 227* 159* 214*  --  143   ALT 23 36 36   < > 22   CR 0.64 0.63 0.68   < > 0.75   GFRESTIMATED >90 >90 >90   < > 78   GFRESTBLACK >90 >90 >90   < > >90  African American GFR Calc     POTASSIUM 3.6 3.5 3.5   < > 3.7   TSH <0.01*  --  0.01*   < > 0.02*    < > = values in this interval not displayed.      BP Readings from Last 3 Encounters:   08/04/20 120/64   02/03/20 110/80   01/28/20 120/78    Wt Readings from Last 3 Encounters:   08/04/20 68.9 kg (151 lb 12.8 oz)   02/03/20 67.6 kg (149 lb)   01/28/20 68.5 kg (151 lb)                    Reviewed and updated as needed this visit by Provider         Review of Systems   Constitutional, HEENT, cardiovascular, pulmonary, gi and gu systems are negative, except as otherwise noted.      Objective    /64   Pulse 61   Temp 96.7  F (35.9  C) (Tympanic)   Resp 11   Ht 1.575 m (5' 2\")   Wt 68.9 kg (151 lb 12.8 oz)   LMP  (LMP Unknown)   SpO2 98%   BMI 27.76 kg/m    Body mass index is 27.76 kg/m .  Physical Exam   GENERAL: healthy, alert and no distress  NECK: no adenopathy, no asymmetry, masses, or scars and thyroid normal to palpation  RESP: lungs clear to auscultation - no rales, rhonchi or wheezes  CV: regular rate and rhythm, normal S1 S2, no S3 or S4, no murmur, click or rub, no " "peripheral edema and peripheral pulses strong  ABDOMEN: soft, nontender, no hepatosplenomegaly, no masses and bowel sounds normal  MS: no gross musculoskeletal defects noted, no edema            Assessment & Plan       ICD-10-CM    1. Osteoarthritis of spine with radiculopathy, cervical region  M47.22    2. Lumbar disc disease with radiculopathy  M51.16    sx has been stable at the current status of workability, has no additional injury, will keep her on the current work restriction status, and keep monitoring continuously, then plan to reassess in 6 months      BMI:   Estimated body mass index is 27.76 kg/m  as calculated from the following:    Height as of this encounter: 1.575 m (5' 2\").    Weight as of this encounter: 68.9 kg (151 lb 12.8 oz).           FUTURE APPOINTMENTS:       - Follow-up visit in 6 months for WC, thyroid and cholesterol in 1 week     No follow-ups on file.    Akhil Almodovar MD  [facility]    "

## 2020-08-04 NOTE — LETTER
[address] [address]  Phone: [phone]    August 4, 2020        Idalia Howe  [address] [place] [address]          To whom it may concern:    RE: Idalia Friedman has been working with current restrictive status since last time. She has worked with repetitive movement including bending back and neck/squatting legs/stretching shoulders and arms/frequent rotation of back and neck related with current position at work. 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 So, I recommend her that she should maintain the level of activity in carrying and level lifting as noted below. Then, we will reassess the functional status in 6 months around February 2nd, 2021 or earlier as needed.       - May work up to 8 hours per day.  - Sitting - standard chair but stretching and positional changes as needed   - Standing and walking - stretching or resting 10 minutes every 30 minutes   - Carrying and level lifting - less than 10 lbs, occasionally(less than 50% of the day's shift). For example, at '[employer]' at her work, she cannot perform more than five trays per one session per day, and not more than three sessions per day    - Bending and lifting - may lift up to 15 lbs on occasional basis, up to 5 times per hour   - Pushing and pulling - less than 15 lbs at height between waist and chest and without bending forward.       Please contact me for questions or concerns.     Sincerely,           Akhil Almodovar MD

## 2020-08-10 ENCOUNTER — VIRTUAL VISIT (OUTPATIENT)
Dept: FAMILY MEDICINE | Facility: CLINIC | Age: 65
End: 2020-08-10
Payer: COMMERCIAL

## 2020-08-10 DIAGNOSIS — E78.5 HYPERLIPIDEMIA LDL GOAL <160: ICD-10-CM

## 2020-08-10 DIAGNOSIS — I10 BENIGN ESSENTIAL HYPERTENSION: ICD-10-CM

## 2020-08-10 DIAGNOSIS — R79.89 LOW VITAMIN D LEVEL: ICD-10-CM

## 2020-08-10 DIAGNOSIS — E03.9 HYPOTHYROIDISM, UNSPECIFIED TYPE: Primary | ICD-10-CM

## 2020-08-10 DIAGNOSIS — G25.81 RESTLESS LEGS SYNDROME (RLS): ICD-10-CM

## 2020-08-10 DIAGNOSIS — G47.09 OTHER INSOMNIA: ICD-10-CM

## 2020-08-10 PROCEDURE — 99214 OFFICE O/P EST MOD 30 MIN: CPT | Mod: 95 | Performed by: FAMILY MEDICINE

## 2020-08-10 RX ORDER — CHLORTHALIDONE 25 MG/1
TABLET ORAL
Qty: 45 TABLET | Refills: 3 | Status: SHIPPED | OUTPATIENT
Start: 2020-08-10 | End: 2020-10-08

## 2020-08-10 NOTE — PROGRESS NOTES
"Idalia Howe is a 64 year old female who is being evaluated via a billable telephone visit.      The patient has been notified of following:     \"This telephone visit will be conducted via a call between you and your physician/provider. We have found that certain health care needs can be provided without the need for a physical exam.  This service lets us provide the care you need with a short phone conversation.  If a prescription is necessary we can send it directly to your pharmacy.  If lab work is needed we can place an order for that and you can then stop by our lab to have the test done at a later time.    Telephone visits are billed at different rates depending on your insurance coverage. During this emergency period, for some insurers they may be billed the same as an in-person visit.  Please reach out to your insurance provider with any questions.    If during the course of the call the physician/provider feels a telephone visit is not appropriate, you will not be charged for this service.\"    Patient has given verbal consent for Telephone visit?  Yes    What phone number would you like to be contacted at? [phone]      How would you like to obtain your AVS? Gosia Landa     Idalia Howe is a 64 year old female who presents via phone visit today for the following health issues:    HPI    Hypothyroidism Follow-up      Since last visit, patient describes the following symptoms: Weight stable, no hair loss, no skin changes, no constipation, no loose stools      How many servings of fruits and vegetables do you eat daily?  4 or more    On average, how many sweetened beverages do you drink each day (Examples: soda, juice, sweet tea, etc.  Do NOT count diet or artificially sweetened beverages)?   3    How many days per week do you exercise enough to make your heart beat faster? 3 or less    How many minutes a day do you exercise enough to make your heart beat faster? 9 or less    How many days per " week do you miss taking your medication? 0      Patient Active Problem List   Diagnosis     Hyperlipidemia LDL goal <160     Lumbar spondylosis     Lumbar disc disease with radiculopathy     DJD (degenerative joint disease) of cervical spine     Malignant neoplasm of thyroid gland (H)     Nonspecific reaction to tuberculin test     Cyst of ovary     History of malignant neoplasm of thyroid     Postoperative hypothyroidism     Acute bronchitis     Chronic bronchitis (H)     Chronic rhinitis     Mixed incontinence     Urgency-frequency syndrome     Hypothyroidism, unspecified type     Myalgia of pelvic floor     Pelvic floor dysfunction     Fecal smearing     Personal history of urinary tract infection     History reviewed. No pertinent surgical history.    Social History     Tobacco Use     Smoking status: Never Smoker     Smokeless tobacco: Never Used   Substance Use Topics     Alcohol use: Yes     Alcohol/week: 0.0 standard drinks     Comment: once-twice a month      History reviewed. No pertinent family history.      Current Outpatient Medications   Medication Sig Dispense Refill     Acetaminophen (TYLENOL PO) Take 325 mg by mouth every 4 hours as needed for mild pain or fever       Calcium Citrate-Vitamin D (CALCIUM + D PO) Take by mouth daily       celecoxib (CELEBREX) 100 MG capsule TAKE 1 CAPSULE(100 MG) BY MOUTH TWICE DAILY 180 capsule 1     chlorthalidone (HYGROTON) 25 MG tablet TAKE 1/2 TABLET(12.5 MG) BY MOUTH DAILY 45 tablet 3     levothyroxine (SYNTHROID/LEVOTHROID) 100 MCG tablet TAKE 1 TABLET(100 MCG) BY MOUTH DAILY 30 tablet 0     losartan (COZAAR) 100 MG tablet TAKE 1 TABLET(100 MG) BY MOUTH DAILY 90 tablet 0     Multiple Vitamins-Minerals (PRESERVISION AREDS PO) Take by mouth daily       PROAIR RESPICLICK 108 (90 Base) MCG/ACT inhaler INHALE 1 PUFF INTO THE LUNGS EVERY 6 HOURS AS NEEDED FOR SHORTNESS OF BREATH OR DIFFICULT BREATHING OR WHEEZING 1 Inhaler 3     simvastatin (ZOCOR) 20 MG tablet TAKE 1  TABLET(20 MG) BY MOUTH AT BEDTIME 90 tablet 1     benzonatate (TESSALON) 100 MG capsule Take 1 capsule (100 mg) by mouth 2 times daily as needed for cough (Patient not taking: Reported on 8/4/2020) 20 capsule 3     montelukast (SINGULAIR) 10 MG tablet TAKE 1 TABLET(10 MG) BY MOUTH AT BEDTIME (Patient not taking: Reported on 8/4/2020) 90 tablet 0     No Known Allergies  Recent Labs   Lab Test 02/03/20  1209 07/22/19  1230 01/09/19  1043  01/23/17  1021   A1C  --  5.7*  --   --   --    *  --  107*  --  95   HDL 58  --  65  --  66   TRIG 227* 159* 214*  --  143   ALT 23 36 36   < > 22   CR 0.64 0.63 0.68   < > 0.75   GFRESTIMATED >90 >90 >90   < > 78   GFRESTBLACK >90 >90 >90   < > >90  African American GFR Calc     POTASSIUM 3.6 3.5 3.5   < > 3.7   TSH <0.01*  --  0.01*   < > 0.02*    < > = values in this interval not displayed.      BP Readings from Last 3 Encounters:   08/04/20 120/64   02/03/20 110/80   01/28/20 120/78    Wt Readings from Last 3 Encounters:   08/04/20 68.9 kg (151 lb 12.8 oz)   02/03/20 67.6 kg (149 lb)   01/28/20 68.5 kg (151 lb)                    Reviewed and updated as needed this visit by Provider         Review of Systems   Constitutional, HEENT, cardiovascular, pulmonary, gi and gu systems are negative, except as otherwise noted.       Objective   Reported vitals:  LMP  (LMP Unknown)    healthy, alert and no distress  PSYCH: Alert and oriented times 3; coherent speech, normal   rate and volume, able to articulate logical thoughts, able   to abstract reason, no tangential thoughts, no hallucinations   or delusions  Her affect is normal  RESP: No cough, no audible wheezing, able to talk in full sentences  Remainder of exam unable to be completed due to telephone visits    Diagnostic Test Results:  Labs reviewed in Epic        Assessment/Plan:    1. Hypothyroidism, unspecified type  Has been stale with current dose of meds, will review the lab and update pt   - TSH with free T4 reflex;  Future  - chlorthalidone (HYGROTON) 25 MG tablet; TAKE 1/2 TABLET(12.5 MG) BY MOUTH DAILY  Dispense: 45 tablet; Refill: 3    2. Low vitamin D level  Has been fluctuating, will recheck lab for further evaluation   - Vitamin D Deficiency; Future    3. Restless legs syndrome (RLS)  Has feeling pain and tingling on LE at sleep, not able to sleep well  Will review the lab and update pt   - Iron and iron binding capacity; Future  - Ferritin; Future    4. Other insomnia  Will have her to try melatonin     5. Benign essential hypertension  Has  Been stable, will keep monitoring   - chlorthalidone (HYGROTON) 25 MG tablet; TAKE 1/2 TABLET(12.5 MG) BY MOUTH DAILY  Dispense: 45 tablet; Refill: 3    6. Hyperlipidemia LDL goal <160  Stable, keep monitoring   - chlorthalidone (HYGROTON) 25 MG tablet; TAKE 1/2 TABLET(12.5 MG) BY MOUTH DAILY  Dispense: 45 tablet; Refill: 3    No follow-ups on file.      Phone call duration:  13 minutes    Akhil Almodovar MD

## 2020-08-17 DIAGNOSIS — G25.81 RESTLESS LEGS SYNDROME (RLS): ICD-10-CM

## 2020-08-17 DIAGNOSIS — R79.89 LOW VITAMIN D LEVEL: ICD-10-CM

## 2020-08-17 DIAGNOSIS — E03.9 HYPOTHYROIDISM, UNSPECIFIED TYPE: ICD-10-CM

## 2020-08-17 PROCEDURE — 84439 ASSAY OF FREE THYROXINE: CPT | Performed by: FAMILY MEDICINE

## 2020-08-17 PROCEDURE — 83540 ASSAY OF IRON: CPT | Performed by: FAMILY MEDICINE

## 2020-08-17 PROCEDURE — 36415 COLL VENOUS BLD VENIPUNCTURE: CPT | Performed by: FAMILY MEDICINE

## 2020-08-17 PROCEDURE — 84443 ASSAY THYROID STIM HORMONE: CPT | Performed by: FAMILY MEDICINE

## 2020-08-17 PROCEDURE — 82306 VITAMIN D 25 HYDROXY: CPT | Performed by: FAMILY MEDICINE

## 2020-08-17 PROCEDURE — 82728 ASSAY OF FERRITIN: CPT | Performed by: FAMILY MEDICINE

## 2020-08-17 PROCEDURE — 83550 IRON BINDING TEST: CPT | Performed by: FAMILY MEDICINE

## 2020-08-18 LAB
DEPRECATED CALCIDIOL+CALCIFEROL SERPL-MC: 37 UG/L (ref 20–75)
FERRITIN SERPL-MCNC: 67 NG/ML (ref 8–252)
IRON SATN MFR SERPL: 33 % (ref 15–46)
IRON SERPL-MCNC: 110 UG/DL (ref 35–180)
T4 FREE SERPL-MCNC: 1.4 NG/DL (ref 0.76–1.46)
TIBC SERPL-MCNC: 337 UG/DL (ref 240–430)
TSH SERPL DL<=0.005 MIU/L-ACNC: 0.22 MU/L (ref 0.4–4)

## 2020-09-07 DIAGNOSIS — E03.9 HYPOTHYROIDISM, UNSPECIFIED TYPE: ICD-10-CM

## 2020-09-08 RX ORDER — LEVOTHYROXINE SODIUM 100 UG/1
TABLET ORAL
Qty: 90 TABLET | Refills: 3 | Status: SHIPPED | OUTPATIENT
Start: 2020-09-08 | End: 2021-09-10

## 2020-10-05 DIAGNOSIS — I10 BENIGN ESSENTIAL HYPERTENSION: ICD-10-CM

## 2020-10-05 DIAGNOSIS — E03.9 HYPOTHYROIDISM, UNSPECIFIED TYPE: ICD-10-CM

## 2020-10-05 DIAGNOSIS — E78.5 HYPERLIPIDEMIA LDL GOAL <160: ICD-10-CM

## 2020-10-06 RX ORDER — CHLORTHALIDONE 25 MG/1
TABLET ORAL
Qty: 45 TABLET | Refills: 3 | OUTPATIENT
Start: 2020-10-06

## 2020-10-08 DIAGNOSIS — I10 BENIGN ESSENTIAL HYPERTENSION: ICD-10-CM

## 2020-10-08 DIAGNOSIS — E78.5 HYPERLIPIDEMIA LDL GOAL <160: ICD-10-CM

## 2020-10-08 DIAGNOSIS — E03.9 HYPOTHYROIDISM, UNSPECIFIED TYPE: ICD-10-CM

## 2020-10-08 DIAGNOSIS — J32.9 PURULENT POSTNASAL DRAINAGE: ICD-10-CM

## 2020-10-08 RX ORDER — CHLORTHALIDONE 25 MG/1
TABLET ORAL
Qty: 45 TABLET | Refills: 3 | Status: SHIPPED | OUTPATIENT
Start: 2020-10-08 | End: 2021-10-11

## 2020-10-08 RX ORDER — MONTELUKAST SODIUM 10 MG/1
TABLET ORAL
Qty: 90 TABLET | Refills: 0 | Status: SHIPPED | OUTPATIENT
Start: 2020-10-08 | End: 2021-01-04

## 2020-10-08 RX ORDER — LOSARTAN POTASSIUM 100 MG/1
100 TABLET ORAL DAILY
Qty: 90 TABLET | Refills: 0 | Status: SHIPPED | OUTPATIENT
Start: 2020-10-08 | End: 2021-01-04

## 2020-10-09 ENCOUNTER — TELEPHONE (OUTPATIENT)
Dept: FAMILY MEDICINE | Facility: CLINIC | Age: 65
End: 2020-10-09

## 2020-11-30 ENCOUNTER — ALLIED HEALTH/NURSE VISIT (OUTPATIENT)
Dept: NURSING | Facility: CLINIC | Age: 65
End: 2020-11-30
Payer: COMMERCIAL

## 2020-11-30 DIAGNOSIS — Z23 NEED FOR PROPHYLACTIC VACCINATION AND INOCULATION AGAINST INFLUENZA: Primary | ICD-10-CM

## 2020-11-30 PROCEDURE — 99207 PR NO CHARGE NURSE ONLY: CPT

## 2020-11-30 PROCEDURE — 90662 IIV NO PRSV INCREASED AG IM: CPT

## 2020-11-30 PROCEDURE — 90471 IMMUNIZATION ADMIN: CPT

## 2021-01-04 DIAGNOSIS — E78.5 HYPERLIPIDEMIA LDL GOAL <160: ICD-10-CM

## 2021-01-04 DIAGNOSIS — J32.9 PURULENT POSTNASAL DRAINAGE: ICD-10-CM

## 2021-01-04 DIAGNOSIS — I10 BENIGN ESSENTIAL HYPERTENSION: ICD-10-CM

## 2021-01-04 DIAGNOSIS — E03.9 HYPOTHYROIDISM, UNSPECIFIED TYPE: ICD-10-CM

## 2021-01-04 RX ORDER — LOSARTAN POTASSIUM 100 MG/1
TABLET ORAL
Qty: 90 TABLET | Refills: 0 | Status: SHIPPED | OUTPATIENT
Start: 2021-01-04 | End: 2021-04-04

## 2021-01-04 RX ORDER — MONTELUKAST SODIUM 10 MG/1
TABLET ORAL
Qty: 90 TABLET | Refills: 0 | Status: SHIPPED | OUTPATIENT
Start: 2021-01-04 | End: 2021-04-02

## 2021-01-04 NOTE — TELEPHONE ENCOUNTER
Prescription approved per [facility] Refill Protocol.    Nell TINSLEY RN  EP Triage

## 2021-03-01 ENCOUNTER — OFFICE VISIT (OUTPATIENT)
Dept: FAMILY MEDICINE | Facility: CLINIC | Age: 66
End: 2021-03-01
Payer: COMMERCIAL

## 2021-03-01 VITALS
DIASTOLIC BLOOD PRESSURE: 82 MMHG | OXYGEN SATURATION: 96 % | BODY MASS INDEX: 27.98 KG/M2 | SYSTOLIC BLOOD PRESSURE: 132 MMHG | HEART RATE: 73 BPM | TEMPERATURE: 97.9 F | WEIGHT: 153 LBS

## 2021-03-01 DIAGNOSIS — M47.816 LUMBAR SPONDYLOSIS: ICD-10-CM

## 2021-03-01 DIAGNOSIS — M51.16 LUMBAR DISC DISEASE WITH RADICULOPATHY: ICD-10-CM

## 2021-03-01 DIAGNOSIS — M47.22 OSTEOARTHRITIS OF SPINE WITH RADICULOPATHY, CERVICAL REGION: Primary | ICD-10-CM

## 2021-03-01 PROCEDURE — 99214 OFFICE O/P EST MOD 30 MIN: CPT | Performed by: FAMILY MEDICINE

## 2021-03-01 NOTE — LETTER
[address] [address]  Phone: [phone]    March 1, 2021        Idalia Howe  [address] [place] [address]          To whom it may concern:    RE: Idalia Friedman has been working with current restrictive status since last time. She has worked with repetitive movement including bending back and neck/squatting legs/stretching shoulders and arms/frequent rotation of back and neck related with current position at work. 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 So, I recommend her that she should maintain the level of activity in carrying and level lifting as noted below. Then, we will reassess the functional status in 6 months around September 1nd, 2021 or earlier as needed.       - May work up to 8 hours per day.  - Sitting - standard chair but stretching and positional changes as needed   - Standing and walking - stretching or resting 10 minutes every 30 minutes   - Carrying and level lifting - less than 10 lbs, occasionally(less than 50% of the day's shift). For example, at 'Fall-Down' at her work, she cannot perform more than five trays per one session per day, and not more than three sessions per day    - Bending and lifting - may lift up to 15 lbs on occasional basis, up to 5 times per hour   - Pushing and pulling - less than 15 lbs at height between waist and chest and without bending forward.       Please contact me for questions or concerns.     Sincerely,      Akhil Almodovar MD

## 2021-03-01 NOTE — PROGRESS NOTES
"    Assessment & Plan     Osteoarthritis of spine with radiculopathy, cervical region  Stable, keep monitoring, has no additional injury,    Will extend her work excuse for next 6 months     Lumbar disc disease with radiculopathy  Mentioned  Above     Lumbar spondylosis  Mentioned above         30 minutes spent on the date of the encounter doing chart review, history and exam, documentation and further activities as noted above       BMI:   Estimated body mass index is 27.98 kg/m  as calculated from the following:    Height as of 8/4/20: 1.575 m (5' 2\").    Weight as of this encounter: 69.4 kg (153 lb).       FUTURE APPOINTMENTS:       - Follow-up visit in 6 months     No follow-ups on file.    Akhil Almodovar MD  [employer]   Idalia is a 65 year old who presents for the following health issues     HPI         Work comp follow up   Back Pain  Onset/Duration: ognoing  Description:   Location of pain: upper back both and neck bilateral  Character of pain: sharp and dull ache  Pain radiation: none  New numbness or weakness in legs, not attributed to pain: no   Intensity: severe  Progression of Symptoms: worsening  History:   Specific cause: work-related  Pain interferes with job:  no   History of back problems: recurrent self limited episodes of low back pain in the past  Any previous MRI or X-rays: None  Sees a specialist for back pain: No  Alleviating factors:   Improved by:   nothing  Precipitating factors:  Worsened by: Sitting    Accompanying Signs & Symptoms:  Risk of Fracture: None  Risk of Cauda Equina: None  Risk of Infection: None  Risk of Cancer: None  Risk of Ankylosing Spondylitis: Onset at age <35, male, AND morning back stiffness  no       Review of Systems   Constitutional, HEENT, cardiovascular, pulmonary, gi and gu systems are negative, except as otherwise noted.      Objective    /82   Pulse 73   Temp 97.9  F (36.6  C) (Tympanic)   Wt 69.4 kg (153 lb)  "  LMP  (LMP Unknown)   SpO2 96%   BMI 27.98 kg/m    Body mass index is 27.98 kg/m .  Physical Exam   GENERAL: healthy, alert and no distress  EYES: Eyes grossly normal to inspection, PERRL and conjunctivae and sclerae normal  HENT: ear canals and TM's normal, nose and mouth without ulcers or lesions  NECK: no adenopathy, no asymmetry, masses, or scars and thyroid normal to palpation  RESP: lungs clear to auscultation - no rales, rhonchi or wheezes  CV: regular rate and rhythm, normal S1 S2, no S3 or S4, no murmur, click or rub, no peripheral edema and peripheral pulses strong  ABDOMEN: soft, nontender, no hepatosplenomegaly, no masses and bowel sounds normal  MS: diffuse back pain with loss of lumbar lordosis, bilateral LE weakness  SKIN: no suspicious lesions or rashes  NEURO: Normal strength and tone, mentation intact and speech normal

## 2021-03-14 ENCOUNTER — HEALTH MAINTENANCE LETTER (OUTPATIENT)
Age: 66
End: 2021-03-14

## 2021-03-31 ENCOUNTER — PATIENT OUTREACH (OUTPATIENT)
Dept: FAMILY MEDICINE | Facility: CLINIC | Age: 66
End: 2021-03-31

## 2021-03-31 NOTE — TELEPHONE ENCOUNTER
Patient Quality Outreach      Summary:    Patient has the following on her problem list/HM: PHQ-2    Patient is due/failing the following:   Breast Cancer Screening - Mammogram and Annual wellness, date due: 02/03/21 and 02/04/21 for Mammo    Type of outreach:    Sent letter.    Questions for provider review:    None                                                                                                                          Tran WAGNER [facility]    Chart routed to delayed for 2 weeks.

## 2021-03-31 NOTE — LETTER
March 31, 2021      Idalia Howe  [address] [place] [address]        Dear Idalia,    I care about your health and have reviewed your health plan. I have reviewed your medical conditions, medication list, and lab results and am making recommendations based on this review, to better manage your health.    You are in particular need of attention regarding:  -Breast Cancer Screening  -Wellness (Physical) Visit     I am recommending that you:  -schedule a WELLNESS (Physical) APPOINTMENT with me.   (If you go elsewhere for Wellness visits then please disregard this reminder.)  -schedule a MAMMOGRAM which is due. We have a mobile mammogram unit that comes to [place]  [facility].To schedule please call the clinic at [phone]. Or, you can call [facility] at [phone] to schedule this.  Please disregard this reminder if you have had this exam elsewhere within the last year.  It would be helpful for us to have a copy of your mammogram report in our file so that we can best coordinate your care.    Here is a list of Health Maintenance topics that are due now or due soon:  Health Maintenance Due   Topic Date Due     Breathing Capacity Test  Never done     COPD Action Plan  Never done     COVID-19 Vaccine (1) Never done     Hepatitis C Screening  Never done     PHQ-2  01/01/2021     Annual Wellness Visit  02/03/2021     Mammogram  02/04/2021       Please call us at [phone] (or use [employer]) to address the above recommendations.     Thank you for trusting [facility] and we appreciate the opportunity to serve you.  We look forward to supporting your healthcare needs in the future.    Healthy Regards,    Akhil Almodovar MD

## 2021-04-02 DIAGNOSIS — E03.9 HYPOTHYROIDISM, UNSPECIFIED TYPE: ICD-10-CM

## 2021-04-02 DIAGNOSIS — E78.5 HYPERLIPIDEMIA LDL GOAL <160: ICD-10-CM

## 2021-04-02 DIAGNOSIS — I10 BENIGN ESSENTIAL HYPERTENSION: ICD-10-CM

## 2021-04-02 DIAGNOSIS — J32.9 PURULENT POSTNASAL DRAINAGE: ICD-10-CM

## 2021-04-02 RX ORDER — MONTELUKAST SODIUM 10 MG/1
TABLET ORAL
Qty: 90 TABLET | Refills: 1 | Status: SHIPPED | OUTPATIENT
Start: 2021-04-02 | End: 2021-07-15

## 2021-04-02 NOTE — TELEPHONE ENCOUNTER
Prescription approved per [facility] Refill Protocol.    Nell TINSLEY RN  EP Triage

## 2021-04-02 NOTE — TELEPHONE ENCOUNTER
Routing refill request to provider for review/approval because:  Labs not current:  Cr and K+ last done 2/3/20    Nell TINSLEY RN  EP Triage

## 2021-04-04 RX ORDER — LOSARTAN POTASSIUM 100 MG/1
TABLET ORAL
Qty: 90 TABLET | Refills: 1 | Status: SHIPPED | OUTPATIENT
Start: 2021-04-04 | End: 2021-10-11

## 2021-04-16 DIAGNOSIS — E03.9 HYPOTHYROIDISM, UNSPECIFIED TYPE: ICD-10-CM

## 2021-04-16 DIAGNOSIS — E78.5 HYPERLIPIDEMIA LDL GOAL <160: ICD-10-CM

## 2021-04-16 DIAGNOSIS — I10 BENIGN ESSENTIAL HYPERTENSION: ICD-10-CM

## 2021-04-16 RX ORDER — SIMVASTATIN 20 MG
TABLET ORAL
Qty: 90 TABLET | Refills: 1 | Status: SHIPPED | OUTPATIENT
Start: 2021-04-16 | End: 2021-10-15

## 2021-04-16 NOTE — TELEPHONE ENCOUNTER
Routing refill request to provider for review/approval because:  Labs not current:  LDL last done 2/3/20    Nell TINSLEY RN  EP Triage

## 2021-04-22 NOTE — TELEPHONE ENCOUNTER
Patient Quality Outreach 2nd Attempt      Summary:    Type of outreach:    Sent [email or web address] message.    Next Steps:  Reach out within 90 days via Phone.    Max number of attempts reached: No. Will try again in 90 days if patient still on fail list.    Questions for provider review:    None                                                                                                                    Tran WAGNER [facility]       Chart routed to postponed for 90 days.

## 2021-05-09 ENCOUNTER — HEALTH MAINTENANCE LETTER (OUTPATIENT)
Age: 66
End: 2021-05-09

## 2021-07-01 ENCOUNTER — PATIENT OUTREACH (OUTPATIENT)
Dept: FAMILY MEDICINE | Facility: CLINIC | Age: 66
End: 2021-07-01

## 2021-07-01 NOTE — TELEPHONE ENCOUNTER
Patient Quality Outreach      Summary:    Patient has the following on her problem list/HM: None    Patient is due/failing the following:   Breast Cancer Screening - Mammogram    Type of outreach:    Sent letter.    Questions for provider review:    None                                                                                                                                     Rimma Fox MA      Chart routed to Care Team.

## 2021-07-01 NOTE — LETTER
July 1, 2021      Idalia Howe  [address] [place] [address]        Dear Idalia,    I care about your health and have reviewed your health plan. I have reviewed your medical conditions, medication list, and lab results and am making recommendations based on this review, to better manage your health.    You are in particular need of attention regarding:  -Breast Cancer Screening    I am recommending that you:  -schedule a MAMMOGRAM which is due. We have a mobile mammogram unit that comes to [place]  [facility].To schedule please call the clinic at [phone]. Or, you can call [facility] at [phone] to schedule this.  Please disregard this reminder if you have had this exam elsewhere within the last year.  It would be helpful for us to have a copy of your mammogram report in our file so that we can best coordinate your care.    Here is a list of Health Maintenance topics that are due now or due soon:  Health Maintenance Due   Topic Date Due     Breathing Capacity Test  Never done     ANNUAL REVIEW OF HM ORDERS  Never done     COPD Action Plan  Never done     COVID-19 Vaccine (1) Never done     Hepatitis C Screening  Never done     PHQ-2  01/01/2021     Annual Wellness Visit  02/03/2021     Mammogram  02/04/2021     Discuss Advance Care Planning  05/25/2021       Please call us at [phone] (or use [employer]) to address the above recommendations.     Thank you for trusting [facility] and we appreciate the opportunity to serve you.  We look forward to supporting your healthcare needs in the future.    Healthy Regards,    Akhil Almodovar MD

## 2021-07-14 ENCOUNTER — OFFICE VISIT (OUTPATIENT)
Dept: FAMILY MEDICINE | Facility: CLINIC | Age: 66
End: 2021-07-14
Payer: COMMERCIAL

## 2021-07-14 VITALS
DIASTOLIC BLOOD PRESSURE: 82 MMHG | BODY MASS INDEX: 27.25 KG/M2 | HEART RATE: 87 BPM | TEMPERATURE: 95.9 F | OXYGEN SATURATION: 97 % | SYSTOLIC BLOOD PRESSURE: 128 MMHG | RESPIRATION RATE: 18 BRPM | WEIGHT: 149 LBS

## 2021-07-14 DIAGNOSIS — M47.816 LUMBAR SPONDYLOSIS: ICD-10-CM

## 2021-07-14 DIAGNOSIS — M47.22 OSTEOARTHRITIS OF SPINE WITH RADICULOPATHY, CERVICAL REGION: Primary | ICD-10-CM

## 2021-07-14 DIAGNOSIS — M51.16 LUMBAR DISC DISEASE WITH RADICULOPATHY: ICD-10-CM

## 2021-07-14 PROCEDURE — 99214 OFFICE O/P EST MOD 30 MIN: CPT | Performed by: FAMILY MEDICINE

## 2021-07-14 ASSESSMENT — PAIN SCALES - GENERAL: PAINLEVEL: SEVERE PAIN (6)

## 2021-07-14 NOTE — PROGRESS NOTES
Assessment & Plan     Osteoarthritis of spine with radiculopathy, cervical region  Stable with current work restriction, keep monitoring     Lumbar disc disease with radiculopathy  Stable, has no additional injury  Will keep monitoring     Lumbar spondylosis  Mentioned above         34 minutes spent on the date of the encounter doing chart review, history and exam, documentation and further activities per the note       FUTURE APPOINTMENTS:       - Follow-up visit in 1 day for cough     No follow-ups on file.    Akhil Almodovar MD  [facility]    Syeda Friedman is a 65 year old who presents for the following health issues     HPI     Concern - work comp   Onset: 1997   Description: pt was injured at work and has daily upper neck pain   Intensity: moderate  Progression of Symptoms:  worsening  Accompanying Signs & Symptoms:tingling on  right leg   Previous history of similar problem:   Precipitating factors:        Worsened by:   Alleviating factors:        Improved by:   Therapies tried and outcome: tylenol, advil         Review of Systems   Constitutional, HEENT, cardiovascular, pulmonary, gi and gu systems are negative, except as otherwise noted.      Objective    /82   Pulse 87   Temp (!) 95.9  F (35.5  C) (Tympanic)   Resp 18   Wt 67.6 kg (149 lb)   LMP  (LMP Unknown)   SpO2 97%   BMI 27.25 kg/m    Body mass index is 27.25 kg/m .  Physical Exam   GENERAL: healthy, alert and no distress  EYES: Eyes grossly normal to inspection, PERRL and conjunctivae and sclerae normal  HENT: ear canals and TM's normal, nose and mouth without ulcers or lesions  NECK: no adenopathy, no asymmetry, masses, or scars and thyroid normal to palpation  RESP: lungs clear to auscultation - no rales, rhonchi or wheezes  CV: regular rate and rhythm, normal S1 S2, no S3 or S4, no murmur, click or rub, no peripheral edema and peripheral pulses strong  ABDOMEN: soft, nontender, no  hepatosplenomegaly, no masses and bowel sounds normal  MS: no gross musculoskeletal defects noted, no edema

## 2021-07-14 NOTE — LETTER
[address] [address]  Phone: [phone]    July 14, 2021        Idalia Howe  [address] [place] [address]          To whom it may concern:    RE: Idalia Friedman has been working with current restrictive status since last time. She has worked with repetitive movement including bending back and neck/squatting legs/stretching shoulders and arms/frequent rotation of back and neck related with current position at work. 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 So, I recommend her that she should maintain the level of activity in carrying and level lifting as noted below. Then, we will reassess the functional status in 6 months around January 1nd, 2022 or earlier as needed.       - May work up to 8 hours per day.  - Sitting - standard chair but stretching and positional changes as needed   - Standing and walking - stretching or resting 10 minutes every 30 minutes   - Carrying and level lifting - less than 10 lbs, occasionally(less than 50% of the day's shift). For example, at 'Fall-Down' at her work, she cannot perform more than five trays per one session per day, and not more than three sessions per day    - Bending and lifting - may lift up to 15 lbs on occasional basis, up to 5 times per hour   - Pushing and pulling - less than 15 lbs at height between waist and chest and without bending forward.       Please contact me for questions or concerns.     Sincerely,        Akhil Almodovar MD

## 2021-07-15 ENCOUNTER — ANCILLARY PROCEDURE (OUTPATIENT)
Dept: GENERAL RADIOLOGY | Facility: CLINIC | Age: 66
End: 2021-07-15
Attending: FAMILY MEDICINE
Payer: COMMERCIAL

## 2021-07-15 ENCOUNTER — OFFICE VISIT (OUTPATIENT)
Dept: FAMILY MEDICINE | Facility: CLINIC | Age: 66
End: 2021-07-15
Payer: COMMERCIAL

## 2021-07-15 VITALS
SYSTOLIC BLOOD PRESSURE: 126 MMHG | WEIGHT: 150.4 LBS | OXYGEN SATURATION: 96 % | BODY MASS INDEX: 27.51 KG/M2 | DIASTOLIC BLOOD PRESSURE: 80 MMHG | HEART RATE: 64 BPM | TEMPERATURE: 96.4 F

## 2021-07-15 DIAGNOSIS — J32.9 PURULENT POSTNASAL DRAINAGE: ICD-10-CM

## 2021-07-15 DIAGNOSIS — R05.9 COUGH: Primary | ICD-10-CM

## 2021-07-15 DIAGNOSIS — J45.40 MODERATE PERSISTENT ASTHMA WITHOUT COMPLICATION: ICD-10-CM

## 2021-07-15 DIAGNOSIS — J42 CHRONIC BRONCHITIS, UNSPECIFIED CHRONIC BRONCHITIS TYPE (H): ICD-10-CM

## 2021-07-15 DIAGNOSIS — K21.00 GASTROESOPHAGEAL REFLUX DISEASE WITH ESOPHAGITIS WITHOUT HEMORRHAGE: ICD-10-CM

## 2021-07-15 DIAGNOSIS — N30.00 ACUTE CYSTITIS WITHOUT HEMATURIA: ICD-10-CM

## 2021-07-15 DIAGNOSIS — C73 MALIGNANT NEOPLASM OF THYROID GLAND (H): ICD-10-CM

## 2021-07-15 PROCEDURE — 71046 X-RAY EXAM CHEST 2 VIEWS: CPT | Performed by: RADIOLOGY

## 2021-07-15 PROCEDURE — 99214 OFFICE O/P EST MOD 30 MIN: CPT | Performed by: FAMILY MEDICINE

## 2021-07-15 RX ORDER — SULFAMETHOXAZOLE/TRIMETHOPRIM 800-160 MG
1 TABLET ORAL 2 TIMES DAILY
Qty: 14 TABLET | Refills: 0 | Status: SHIPPED | OUTPATIENT
Start: 2021-07-15 | End: 2021-12-07

## 2021-07-15 RX ORDER — MONTELUKAST SODIUM 10 MG/1
TABLET ORAL
Qty: 90 TABLET | Refills: 1 | Status: SHIPPED | OUTPATIENT
Start: 2021-07-15 | End: 2022-01-10

## 2021-07-15 NOTE — PROGRESS NOTES
Assessment & Plan     Cough    - XR Chest 2 Views  - fluticasone (FLOVENT DISKUS) 100 MCG/BLIST inhaler; Inhale 1 puff into the lungs every 12 hours    Purulent postnasal drainage  Worsening with allergic sx, will have her to resume montelukast   - montelukast (SINGULAIR) 10 MG tablet; TAKE 1 TABLET(10 MG) BY MOUTH AT BEDTIME    Moderate persistent asthma without complication  Not improving, will have her to try corticosteroid inhaler   - fluticasone (FLOVENT DISKUS) 100 MCG/BLIST inhaler; Inhale 1 puff into the lungs every 12 hours    Acute cystitis without hematuria  Worsening with dysuria, will have her to try abx   - sulfamethoxazole-trimethoprim (BACTRIM DS) 800-160 MG tablet; Take 1 tablet by mouth 2 times daily    Gastroesophageal reflux disease with esophagitis without hemorrhage  Worsening, will have her to try PPI  - omeprazole (PRILOSEC) 20 MG DR capsule; Take 1 capsule (20 mg) by mouth daily      34 minutes spent on the date of the encounter doing chart review, history and exam, documentation and further activities per the note       FUTURE APPOINTMENTS:       - Follow-up visit in 1 [facility] if not improving     No follow-ups on file.    Akhil Almodovar MD  [facility] PRAIR[facility] Friedman is a 65 year old who presents for the following health issues     HPI     Concern - cough  Onset: ongoing worse in the last couple months  Description: chronic cough  Intensity: moderate  Progression of Symptoms:  worsening and same  Accompanying Signs & Symptoms: cough  Previous history of similar problem: ongoing  Precipitating factors:        Worsened by: none  Alleviating factors:        Improved by: none  Therapies tried and outcome:  none         Review of Systems   Constitutional, HEENT, cardiovascular, pulmonary, gi and gu systems are negative, except as otherwise noted.      Objective    /80 (Cuff Size: Adult Regular)   Pulse 64   Temp (!) 96.4  F (35.8  C) (Tympanic)   Wt  68.2 kg (150 lb 6.4 oz)   LMP  (LMP Unknown)   SpO2 96%   BMI 27.51 kg/m    Body mass index is 27.51 kg/m .  Physical Exam   GENERAL: healthy, alert and no distress  EYES: Eyes grossly normal to inspection, PERRL and conjunctivae and sclerae normal  HENT: ear canals and TM's normal, nose and mouth without ulcers or lesions  NECK: no adenopathy, no asymmetry, masses, or scars and thyroid normal to palpation  RESP: lungs clear to auscultation - no rales, rhonchi or wheezes  CV: regular rate and rhythm, normal S1 S2, no S3 or S4, no murmur, click or rub, no peripheral edema and peripheral pulses strong  ABDOMEN: soft, nontender, no hepatosplenomegaly, no masses and bowel sounds normal  MS: no gross musculoskeletal defects noted, no edema  SKIN: no suspicious lesions or rashes  NEURO: Normal strength and tone, mentation intact and speech normal    Xray - Reviewed and interpreted by me.  WNL

## 2021-07-29 NOTE — TELEPHONE ENCOUNTER
Patient Quality Outreach 2nd Attempt      Summary:    Type of outreach:    Phone, left message for patient/parent to call back.      Please schedule pt for a mammogram and a physical       Rimma MATILDE Fox

## 2021-09-10 DIAGNOSIS — E03.9 HYPOTHYROIDISM, UNSPECIFIED TYPE: ICD-10-CM

## 2021-09-10 RX ORDER — LEVOTHYROXINE SODIUM 100 UG/1
TABLET ORAL
Qty: 90 TABLET | Refills: 3 | Status: SHIPPED | OUTPATIENT
Start: 2021-09-10 | End: 2022-01-10

## 2021-09-10 NOTE — TELEPHONE ENCOUNTER
Routing refill request to provider for review/approval because:  Labs not current:  TSH last done 8/17/20    Nell TINSLEY RN  EP Triage

## 2021-10-10 DIAGNOSIS — E03.9 HYPOTHYROIDISM, UNSPECIFIED TYPE: ICD-10-CM

## 2021-10-10 DIAGNOSIS — J32.9 PURULENT POSTNASAL DRAINAGE: ICD-10-CM

## 2021-10-10 DIAGNOSIS — E78.5 HYPERLIPIDEMIA LDL GOAL <160: ICD-10-CM

## 2021-10-10 DIAGNOSIS — I10 BENIGN ESSENTIAL HYPERTENSION: ICD-10-CM

## 2021-10-11 RX ORDER — LOSARTAN POTASSIUM 100 MG/1
TABLET ORAL
Qty: 90 TABLET | Refills: 1 | Status: SHIPPED | OUTPATIENT
Start: 2021-10-11 | End: 2022-01-10

## 2021-10-11 RX ORDER — CHLORTHALIDONE 25 MG/1
TABLET ORAL
Qty: 45 TABLET | Refills: 3 | Status: SHIPPED | OUTPATIENT
Start: 2021-10-11 | End: 2022-01-10

## 2021-10-11 RX ORDER — MONTELUKAST SODIUM 10 MG/1
TABLET ORAL
Qty: 90 TABLET | Refills: 1 | OUTPATIENT
Start: 2021-10-11

## 2021-10-11 NOTE — TELEPHONE ENCOUNTER
Routing refill request to provider for review/approval because:  Labs not current    Nell TINSLEY RN  EP Triage

## 2021-10-15 DIAGNOSIS — E03.9 HYPOTHYROIDISM, UNSPECIFIED TYPE: ICD-10-CM

## 2021-10-15 DIAGNOSIS — I10 BENIGN ESSENTIAL HYPERTENSION: ICD-10-CM

## 2021-10-15 DIAGNOSIS — E78.5 HYPERLIPIDEMIA LDL GOAL <160: ICD-10-CM

## 2021-10-15 RX ORDER — SIMVASTATIN 20 MG
TABLET ORAL
Qty: 90 TABLET | Refills: 1 | Status: SHIPPED | OUTPATIENT
Start: 2021-10-15 | End: 2022-01-10

## 2021-10-15 NOTE — TELEPHONE ENCOUNTER
Routing refill request to provider for review/approval because:  Labs not current:  HEYDI TINSLEY RN  EP Triage

## 2021-10-24 ENCOUNTER — HEALTH MAINTENANCE LETTER (OUTPATIENT)
Age: 66
End: 2021-10-24

## 2021-10-25 ENCOUNTER — PATIENT OUTREACH (OUTPATIENT)
Dept: FAMILY MEDICINE | Facility: CLINIC | Age: 66
End: 2021-10-25

## 2021-10-25 NOTE — TELEPHONE ENCOUNTER
Patient Quality Outreach      Summary:    Patient has the following on her problem list/HM: None    Patient is due/failing the following:   Physical     Type of outreach:    Sent letter.    Questions for provider review:    None                                                                                                                                     Rimma Fox MA      Chart routed to Care Team .

## 2021-10-25 NOTE — LETTER
October 25, 2021      Idalia Howe  [address] [place] [address]        Dear Idalia,    I care about your health and have reviewed your health plan. I have reviewed your medical conditions, medication list, and lab results and am making recommendations based on this review, to better manage your health.    You are in particular need of attention regarding:  -Wellness (Physical) Visit     I am recommending that you:  -schedule a WELLNESS (Physical) APPOINTMENT with me.   I will check fasting labs the same day - nothing to eat except water and meds for 8-10 hours prior.    Here is a list of Health Maintenance topics that are due now or due soon:  Health Maintenance Due   Topic Date Due     Breathing Capacity Test  Never done     COPD Action Plan  Never done     Hepatitis C Screening  Never done     Annual Wellness Visit  02/03/2021     Mammogram  02/04/2021     Discuss Advance Care Planning  05/25/2021     FALL RISK ASSESSMENT  08/04/2021     Flu Vaccine (1) 09/01/2021       Please call us at [phone] (or use [employer]) to address the above recommendations.     Thank you for trusting [facility] and we appreciate the opportunity to serve you.  We look forward to supporting your healthcare needs in the future.    Healthy Regards,    Akhil Almodovar MD

## 2021-11-10 ENCOUNTER — PATIENT OUTREACH (OUTPATIENT)
Dept: FAMILY MEDICINE | Facility: CLINIC | Age: 66
End: 2021-11-10
Payer: COMMERCIAL

## 2021-11-10 NOTE — TELEPHONE ENCOUNTER
Patient Quality Outreach    Patient is due for the following:   Physical     NEXT STEPS:   No follow up needed at this time.    Type of outreach:    Sent letter.      Questions for provider review:         Rimma Fox CMA

## 2021-11-10 NOTE — LETTER
November 10, 2021      Idalia Howe  [address] [place] [address]        Dear Idalia,    I care about your health and have reviewed your health plan. I have reviewed your medical conditions, medication list, and lab results and am making recommendations based on this review, to better manage your health.    You are in particular need of attention regarding:  -Wellness (Physical) Visit     I am recommending that you:  -schedule a WELLNESS (Physical) APPOINTMENT with me.   I will check fasting labs the same day - nothing to eat except water and meds for 8-10 hours prior.    Here is a list of Health Maintenance topics that are due now or due soon:  Health Maintenance Due   Topic Date Due     Breathing Capacity Test  Never done     COPD Action Plan  Never done     Hepatitis C Screening  Never done     Annual Wellness Visit  02/03/2021     Mammogram  02/04/2021     Discuss Advance Care Planning  05/25/2021     FALL RISK ASSESSMENT  08/04/2021     Flu Vaccine (1) 09/01/2021     COVID-19 Vaccine (3 - Booster for Pfizer series) 11/15/2021       Please call us at [phone] (or use [employer]) to address the above recommendations.     Thank you for trusting [facility] and we appreciate the opportunity to serve you.  We look forward to supporting your healthcare needs in the future.    Healthy Regards,    Akhil Almodovar MD

## 2021-12-06 ENCOUNTER — PATIENT OUTREACH (OUTPATIENT)
Dept: FAMILY MEDICINE | Facility: CLINIC | Age: 66
End: 2021-12-06
Payer: COMMERCIAL

## 2021-12-06 NOTE — LETTER
December 6, 2021      Idalia Howe  [address] [place] [address]        Dear Idalia,    I care about your health and have reviewed your health plan. I have reviewed your medical conditions, medication list, and lab results and am making recommendations based on this review, to better manage your health.    You are in particular need of attention regarding:  -Breast Cancer Screening    I am recommending that you:  -schedule a MAMMOGRAM which is due. We have a mobile mammogram unit that comes to [facility].To schedule please call the clinic at [phone]. Or, you can call [facility] at [phone] to schedule this.  Please disregard this reminder if you have had this exam elsewhere within the last year.  It would be helpful for us to have a copy of your mammogram report in our file so that we can best coordinate your care.    Here is a list of Health Maintenance topics that are due now or due soon:  Health Maintenance Due   Topic Date Due     Breathing Capacity Test  Never done     COPD Action Plan  Never done     Hepatitis C Screening  Never done     Annual Wellness Visit  02/03/2021     Mammogram  02/04/2021     Discuss Advance Care Planning  05/25/2021     FALL RISK ASSESSMENT  08/04/2021     Flu Vaccine (1) 09/01/2021     COVID-19 Vaccine (3 - Booster for Pfizer series) 11/15/2021     Colorectal Cancer Screening  01/01/2022       Please call us at [phone] (or use [employer]) to address the above recommendations.     Thank you for trusting [facility] and we appreciate the opportunity to serve you.  We look forward to supporting your healthcare needs in the future.    Healthy Regards,    Akhil Almodovar MD

## 2021-12-06 NOTE — TELEPHONE ENCOUNTER
Patient Quality Outreach    Patient is due for the following:   Breast Cancer Screening - Mammogram  Type of outreach:    Sent letter.      Questions for provider review:    None     Rimma Fox CMA

## 2021-12-07 ENCOUNTER — OFFICE VISIT (OUTPATIENT)
Dept: FAMILY MEDICINE | Facility: CLINIC | Age: 66
End: 2021-12-07
Payer: COMMERCIAL

## 2021-12-07 VITALS
TEMPERATURE: 98.9 F | DIASTOLIC BLOOD PRESSURE: 98 MMHG | OXYGEN SATURATION: 97 % | RESPIRATION RATE: 20 BRPM | SYSTOLIC BLOOD PRESSURE: 136 MMHG | HEART RATE: 96 BPM

## 2021-12-07 DIAGNOSIS — J06.9 VIRAL URI WITH COUGH: Primary | ICD-10-CM

## 2021-12-07 DIAGNOSIS — J20.9 ACUTE BRONCHITIS, UNSPECIFIED ORGANISM: ICD-10-CM

## 2021-12-07 PROCEDURE — U0005 INFEC AGEN DETEC AMPLI PROBE: HCPCS | Performed by: INTERNAL MEDICINE

## 2021-12-07 PROCEDURE — U0003 INFECTIOUS AGENT DETECTION BY NUCLEIC ACID (DNA OR RNA); SEVERE ACUTE RESPIRATORY SYNDROME CORONAVIRUS 2 (SARS-COV-2) (CORONAVIRUS DISEASE [COVID-19]), AMPLIFIED PROBE TECHNIQUE, MAKING USE OF HIGH THROUGHPUT TECHNOLOGIES AS DESCRIBED BY CMS-2020-01-R: HCPCS | Performed by: INTERNAL MEDICINE

## 2021-12-07 PROCEDURE — 99213 OFFICE O/P EST LOW 20 MIN: CPT | Performed by: INTERNAL MEDICINE

## 2021-12-07 RX ORDER — CODEINE PHOSPHATE AND GUAIFENESIN 10; 100 MG/5ML; MG/5ML
1-2 SOLUTION ORAL EVERY 4 HOURS PRN
Qty: 236 ML | Refills: 0 | Status: SHIPPED | OUTPATIENT
Start: 2021-12-07 | End: 2022-01-11

## 2021-12-07 RX ORDER — DOXYCYCLINE HYCLATE 100 MG
100 TABLET ORAL 2 TIMES DAILY
Qty: 14 TABLET | Refills: 0 | Status: SHIPPED | OUTPATIENT
Start: 2021-12-07 | End: 2021-12-14

## 2021-12-07 NOTE — PROGRESS NOTES
Assessment & Plan     1.  Viral URI with cough.  Rule out COVID-19 infection.  PCR test ordered.  2.  Acute bronchitis most likely has had symptoms for more than 10 days so we will treat with doxycycline 100 mg twice a day for 7 days also prescribed Robitussin with codeine for cough suppression.                 No follow-ups on file.    Anton Mane MD  [facility] COREY Friedman is a 65 year old who presents for the following health issues     URI    History of Present Illness       Hyperlipidemia:  She presents for follow up of hyperlipidemia.  She is taking medication to lower cholesterol. She is having myalgia or other side effects to statin medications.    Hypertension: She presents for follow up of hypertension.  She does not check blood pressure  regularly outside of the clinic. Outpatient blood pressures have not been over 140/90. She follows a low salt diet.     She eats 2-3 servings of fruits and vegetables daily.She consumes 1 sweetened beverage(s) daily. She exercises with enough effort to increase her heart rate 3 or less days per week. She is missing 1 dose(s) of medications per week.       Acute Illness  Acute illness concerns:   Onset/Duration: 3-4 days  Symptoms:  Fever: no  Chills/Sweats: YES  Headache (location?): YES  Sinus Pressure: no  Conjunctivitis:  no  Ear Pain: no  Rhinorrhea: YES  Congestion: YES  Sore Throat: no  Cough: YES  Wheeze: no  Decreased Appetite: no  Nausea: no  Vomiting: no  Diarrhea: no  Dysuria/Freq.: no  Dysuria or Hematuria: no  Fatigue/Achiness: YES  Sick/Strep Exposure: no  Therapies tried and outcome: tylenol nighttime cold and flu, chest some sore throat medication    Patient complains of head and chest congestion.  Cold type symptoms.  No loss of sense of smell or taste.  No fever or chills.  Symptoms more than 10 days but last 4 days the cough has been very persistent and is starting speaking of her ribs and inability to sleep at  night she is not wheezing.  She gets infection like this off and on particularly fall in the spring.  She is a  who has been fully vaccinated for COVID-19.        Review of Systems   Constitutional, HEENT, cardiovascular, pulmonary, GI, , musculoskeletal, neuro, skin, endocrine and psych systems are negative, except as otherwise noted.      Objective    LMP  (LMP Unknown)   There is no height or weight on file to calculate BMI.  Physical Exam   GENERAL: healthy, alert and no distress  NECK: no adenopathy, no asymmetry, masses, or scars and thyroid normal to palpation  RESP: lungs clear to auscultation - no rales, rhonchi or wheezes, no rales , no rhonchi and no wheezes  CV: regular rate and rhythm, normal S1 S2, no S3 or S4, no murmur, click or rub, no peripheral edema and peripheral pulses strong  ABDOMEN: soft, nontender, no hepatosplenomegaly, no masses and bowel sounds normal  MS: no gross musculoskeletal defects noted, no edema

## 2021-12-08 LAB — SARS-COV-2 RNA RESP QL NAA+PROBE: POSITIVE

## 2021-12-09 ENCOUNTER — TELEPHONE (OUTPATIENT)
Dept: FAMILY MEDICINE | Facility: CLINIC | Age: 66
End: 2021-12-09
Payer: COMMERCIAL

## 2021-12-09 NOTE — TELEPHONE ENCOUNTER
Coronavirus (COVID-19) Notification    Reason for call  Notify of POSITIVE  COVID-19 lab result, assess symptoms,  review [facility] recommendations    Lab Result   Lab test for 2019-nCoV rRt-PCR or SARS-COV-2 PCR  Oropharyngeal AND/OR nasopharyngeal swabs were POSITIVE for 2019-nCoV RNA [OR] SARS-COV-2 RNA (COVID-19) RNA     We have been unable to reach Patient by phone at this time to notify of their Positive COVID-19 result.  Left voicemail message requesting a call back to [phone] [facility] for results.        Kinyarwanda  Phong, #[phone]    POSITIVE COVID-19 Letter sent.    Joelle Matias LPN

## 2021-12-09 NOTE — TELEPHONE ENCOUNTER
FMLA form completed and at the  for . Copy sent to [employer]. Pt notified.  Rosibel Benavides,

## 2021-12-17 DIAGNOSIS — R05.9 COUGH: Primary | ICD-10-CM

## 2021-12-17 RX ORDER — CODEINE PHOSPHATE/GUAIFENESIN 10-100MG/5
5 LIQUID (ML) ORAL EVERY 6 HOURS PRN
Qty: 300 ML | Refills: 0 | Status: SHIPPED | OUTPATIENT
Start: 2021-12-17 | End: 2022-01-11

## 2021-12-19 DIAGNOSIS — J45.40 MODERATE PERSISTENT ASTHMA WITHOUT COMPLICATION: ICD-10-CM

## 2021-12-19 DIAGNOSIS — R05.9 COUGH: ICD-10-CM

## 2021-12-21 ENCOUNTER — PATIENT OUTREACH (OUTPATIENT)
Dept: FAMILY MEDICINE | Facility: CLINIC | Age: 66
End: 2021-12-21
Payer: COMMERCIAL

## 2022-01-10 ENCOUNTER — OFFICE VISIT (OUTPATIENT)
Dept: FAMILY MEDICINE | Facility: CLINIC | Age: 67
End: 2022-01-10
Payer: COMMERCIAL

## 2022-01-10 VITALS
HEART RATE: 62 BPM | OXYGEN SATURATION: 96 % | HEIGHT: 63 IN | DIASTOLIC BLOOD PRESSURE: 88 MMHG | RESPIRATION RATE: 18 BRPM | TEMPERATURE: 97.8 F | SYSTOLIC BLOOD PRESSURE: 134 MMHG | BODY MASS INDEX: 26.58 KG/M2 | WEIGHT: 150 LBS

## 2022-01-10 DIAGNOSIS — Z20.822 EXPOSURE TO 2019 NOVEL CORONAVIRUS: ICD-10-CM

## 2022-01-10 DIAGNOSIS — Z12.11 SPECIAL SCREENING FOR MALIGNANT NEOPLASMS, COLON: ICD-10-CM

## 2022-01-10 DIAGNOSIS — J20.9 ACUTE BRONCHITIS WITH SYMPTOMS > 10 DAYS: ICD-10-CM

## 2022-01-10 DIAGNOSIS — C73 MALIGNANT NEOPLASM OF THYROID GLAND (H): ICD-10-CM

## 2022-01-10 DIAGNOSIS — I10 BENIGN ESSENTIAL HYPERTENSION: ICD-10-CM

## 2022-01-10 DIAGNOSIS — E03.9 HYPOTHYROIDISM, UNSPECIFIED TYPE: ICD-10-CM

## 2022-01-10 DIAGNOSIS — J32.9 PURULENT POSTNASAL DRAINAGE: ICD-10-CM

## 2022-01-10 DIAGNOSIS — Z12.11 SCREEN FOR COLON CANCER: ICD-10-CM

## 2022-01-10 DIAGNOSIS — J42 CHRONIC BRONCHITIS, UNSPECIFIED CHRONIC BRONCHITIS TYPE (H): ICD-10-CM

## 2022-01-10 DIAGNOSIS — Z23 NEED FOR PROPHYLACTIC VACCINATION AND INOCULATION AGAINST INFLUENZA: ICD-10-CM

## 2022-01-10 DIAGNOSIS — Z00.00 HEALTH MAINTENANCE EXAMINATION: Primary | ICD-10-CM

## 2022-01-10 DIAGNOSIS — E78.5 HYPERLIPIDEMIA LDL GOAL <160: ICD-10-CM

## 2022-01-10 DIAGNOSIS — Z12.31 ENCOUNTER FOR SCREENING MAMMOGRAM FOR BREAST CANCER: ICD-10-CM

## 2022-01-10 LAB
ERYTHROCYTE [DISTWIDTH] IN BLOOD BY AUTOMATED COUNT: 14.1 % (ref 10–15)
HCT VFR BLD AUTO: 45.2 % (ref 35–47)
HGB BLD-MCNC: 15 G/DL (ref 11.7–15.7)
MCH RBC QN AUTO: 28.9 PG (ref 26.5–33)
MCHC RBC AUTO-ENTMCNC: 33.2 G/DL (ref 31.5–36.5)
MCV RBC AUTO: 87 FL (ref 78–100)
PLATELET # BLD AUTO: 399 10E3/UL (ref 150–450)
RBC # BLD AUTO: 5.19 10E6/UL (ref 3.8–5.2)
WBC # BLD AUTO: 9.5 10E3/UL (ref 4–11)

## 2022-01-10 PROCEDURE — 90471 IMMUNIZATION ADMIN: CPT | Performed by: FAMILY MEDICINE

## 2022-01-10 PROCEDURE — 80053 COMPREHEN METABOLIC PANEL: CPT | Performed by: FAMILY MEDICINE

## 2022-01-10 PROCEDURE — 90662 IIV NO PRSV INCREASED AG IM: CPT | Performed by: FAMILY MEDICINE

## 2022-01-10 PROCEDURE — 99214 OFFICE O/P EST MOD 30 MIN: CPT | Mod: 25 | Performed by: FAMILY MEDICINE

## 2022-01-10 PROCEDURE — G0438 PPPS, INITIAL VISIT: HCPCS | Mod: 25 | Performed by: FAMILY MEDICINE

## 2022-01-10 PROCEDURE — 84443 ASSAY THYROID STIM HORMONE: CPT | Performed by: FAMILY MEDICINE

## 2022-01-10 PROCEDURE — 36415 COLL VENOUS BLD VENIPUNCTURE: CPT | Performed by: FAMILY MEDICINE

## 2022-01-10 PROCEDURE — 84439 ASSAY OF FREE THYROXINE: CPT | Performed by: FAMILY MEDICINE

## 2022-01-10 PROCEDURE — 80061 LIPID PANEL: CPT | Performed by: FAMILY MEDICINE

## 2022-01-10 PROCEDURE — 85027 COMPLETE CBC AUTOMATED: CPT | Performed by: FAMILY MEDICINE

## 2022-01-10 RX ORDER — LEVOTHYROXINE SODIUM 100 UG/1
TABLET ORAL
Qty: 90 TABLET | Refills: 3 | Status: SHIPPED | OUTPATIENT
Start: 2022-01-10 | End: 2022-01-11

## 2022-01-10 RX ORDER — CHLORTHALIDONE 25 MG/1
TABLET ORAL
Qty: 45 TABLET | Refills: 3 | Status: SHIPPED | OUTPATIENT
Start: 2022-01-10 | End: 2022-10-11

## 2022-01-10 RX ORDER — BENZONATATE 100 MG/1
100 CAPSULE ORAL 3 TIMES DAILY PRN
Qty: 21 CAPSULE | Refills: 1 | Status: SHIPPED | OUTPATIENT
Start: 2022-01-10 | End: 2023-03-23

## 2022-01-10 RX ORDER — MONTELUKAST SODIUM 10 MG/1
TABLET ORAL
Qty: 90 TABLET | Refills: 1 | Status: SHIPPED | OUTPATIENT
Start: 2022-01-10 | End: 2022-12-08

## 2022-01-10 RX ORDER — SIMVASTATIN 20 MG
20 TABLET ORAL AT BEDTIME
Qty: 90 TABLET | Refills: 1 | Status: SHIPPED | OUTPATIENT
Start: 2022-01-10 | End: 2022-01-11

## 2022-01-10 RX ORDER — CODEINE PHOSPHATE/GUAIFENESIN 10-100MG/5
5 LIQUID (ML) ORAL EVERY 6 HOURS PRN
Qty: 300 ML | Refills: 1 | Status: SHIPPED | OUTPATIENT
Start: 2022-01-10 | End: 2022-12-08

## 2022-01-10 RX ORDER — LOSARTAN POTASSIUM 100 MG/1
100 TABLET ORAL DAILY
Qty: 90 TABLET | Refills: 1 | Status: SHIPPED | OUTPATIENT
Start: 2022-01-10 | End: 2022-10-11

## 2022-01-10 ASSESSMENT — ENCOUNTER SYMPTOMS
COUGH: 1
EYE PAIN: 1
ARTHRALGIAS: 0
PARESTHESIAS: 0
SHORTNESS OF BREATH: 1
DIZZINESS: 1
HEMATURIA: 0
ABDOMINAL PAIN: 0
DIARRHEA: 0
PALPITATIONS: 0
CONSTIPATION: 0
DYSURIA: 0
SORE THROAT: 1
WEAKNESS: 0
FEVER: 0
HEMATOCHEZIA: 0
MYALGIAS: 1
HEADACHES: 0
NERVOUS/ANXIOUS: 0
CHILLS: 0
HEARTBURN: 0
BREAST MASS: 0
FREQUENCY: 1
JOINT SWELLING: 0
NAUSEA: 0

## 2022-01-10 ASSESSMENT — PAIN SCALES - GENERAL: PAINLEVEL: NO PAIN (0)

## 2022-01-10 ASSESSMENT — PATIENT HEALTH QUESTIONNAIRE - PHQ9
SUM OF ALL RESPONSES TO PHQ QUESTIONS 1-9: 7
10. IF YOU CHECKED OFF ANY PROBLEMS, HOW DIFFICULT HAVE THESE PROBLEMS MADE IT FOR YOU TO DO YOUR WORK, TAKE CARE OF THINGS AT HOME, OR GET ALONG WITH OTHER PEOPLE: NOT DIFFICULT AT ALL
SUM OF ALL RESPONSES TO PHQ QUESTIONS 1-9: 7

## 2022-01-10 ASSESSMENT — MIFFLIN-ST. JEOR: SCORE: 1175.56

## 2022-01-10 ASSESSMENT — ACTIVITIES OF DAILY LIVING (ADL): CURRENT_FUNCTION: NO ASSISTANCE NEEDED

## 2022-01-10 NOTE — PROGRESS NOTES
"SUBJECTIVE:   Idalia Howe is a 66 year old female who presents for Preventive Visit.      Patient has been advised of split billing requirements and indicates understanding: Yes   Are you in the first 12 months of your Medicare coverage?  No    Healthy Habits:     In general, how would you rate your overall health?  Good    Frequency of exercise:  2-3 days/week    Duration of exercise:  30-45 minutes    Do you usually eat at least 4 servings of fruit and vegetables a day, include whole grains    & fiber and avoid regularly eating high fat or \"junk\" foods?  Yes    Taking medications regularly:  Yes    Medication side effects:  None    Ability to successfully perform activities of daily living:  No assistance needed    Home Safety:  Lack of grab bars in the bathroom and lighting is poor    Hearing Impairment:  No hearing concerns    In the past 6 months, have you been bothered by leaking of urine? Yes    In general, how would you rate your overall mental or emotional health?  Good      PHQ-2 Total Score: 3    Additional concerns today:  No         Patient would like to discuss:  - lungs and throat concern: coughing a lot. Chronic. Seasonal type.   - dizziness every once in a while; wondering if there is some blood work to assist to see what is going on  Concerned due to her father passing away with that type of symptom.   - left sided collar bone pain  Sleeping on that side hurts as well    Do you feel safe in your environment? Yes    Have you ever done Advance Care Planning? (For example, a Health Directive, POLST, or a discussion with a medical provider or your loved ones about your wishes): No, advance care planning information given to patient to review.  Patient declined advance care planning discussion at this time.    Fall risk  Fallen 2 or more times in the past year?: No  Any fall with injury in the past year?: No    Cognitive Screening   1) Repeat 3 items (Leader, Season, Table)    2) Clock draw: " NORMAL  3) 3 item recall: Recalls 2 objects   Results: NORMAL clock, 1-2 items recalled: COGNITIVE IMPAIRMENT LESS LIKELY    Mini-CogTM Copyright S Cyndi. Licensed by the author for use in [facility]; reprinted with permission (rodger@[facility]). All rights reserved.      Do you have sleep apnea, excessive snoring or daytime drowsiness?: no    Reviewed and updated as needed this visit by clinical staff  Tobacco  Allergies  Meds   Med Hx  Surg Hx  Fam Hx  Soc Hx       Reviewed and updated as needed this visit by Provider               Social History     Tobacco Use     Smoking status: Never Smoker     Smokeless tobacco: Never Used   Substance Use Topics     Alcohol use: Yes     Alcohol/week: 0.0 standard drinks     Comment: once-twice a month      If you drink alcohol do you typically have >3 drinks per day or >7 drinks per week? No    Alcohol Use 1/10/2022   Prescreen: >3 drinks/day or >7 drinks/week? No   Prescreen: >3 drinks/day or >7 drinks/week? -   No flowsheet data found.        Current providers sharing in care for this patient include:   Patient Care Team:  Akhil Almodovar MD as PCP - General (Family Practice)  Akhil Almodovar MD as Assigned PCP    The following health maintenance items are reviewed in Epic and correct as of today:  Health Maintenance Due   Topic Date Due     SPIROMETRY  Never done     COPD ACTION PLAN  Never done     HEPATITIS C SCREENING  Never done     MAMMO SCREENING  02/04/2021     FALL RISK ASSESSMENT  08/04/2021     INFLUENZA VACCINE (1) 09/01/2021     COVID-19 Vaccine (3 - Booster for Pfizer series) 11/15/2021     COLORECTAL CANCER SCREENING  01/01/2022     BP Readings from Last 3 Encounters:   01/10/22 134/88   12/07/21 (!) 136/98   07/15/21 126/80    Wt Readings from Last 3 Encounters:   01/10/22 68 kg (150 lb)   07/15/21 68.2 kg (150 lb 6.4 oz)   07/14/21 67.6 kg (149 lb)                  Patient Active Problem List   Diagnosis     Hyperlipidemia LDL goal <160     Lumbar  spondylosis     Lumbar disc disease with radiculopathy     DJD (degenerative joint disease) of cervical spine     Malignant neoplasm of thyroid gland (H)     Nonspecific reaction to tuberculin test     Cyst of ovary     History of malignant neoplasm of thyroid     Postoperative hypothyroidism     Acute bronchitis     Chronic bronchitis (H)     Chronic rhinitis     Mixed incontinence     Urgency-frequency syndrome     Hypothyroidism, unspecified type     Myalgia of pelvic floor     Pelvic floor dysfunction     Fecal smearing     Personal history of urinary tract infection     History reviewed. No pertinent surgical history.    Social History     Tobacco Use     Smoking status: Never Smoker     Smokeless tobacco: Never Used   Substance Use Topics     Alcohol use: Yes     Alcohol/week: 0.0 standard drinks     Comment: once-twice a month      History reviewed. No pertinent family history.      Current Outpatient Medications   Medication Sig Dispense Refill     Acetaminophen (TYLENOL PO) Take 325 mg by mouth every 4 hours as needed for mild pain or fever       benzonatate (TESSALON) 100 MG capsule Take 1 capsule (100 mg) by mouth 3 times daily as needed for cough 21 capsule 1     benzonatate (TESSALON) 100 MG capsule Take 1 capsule (100 mg) by mouth 2 times daily as needed for cough 20 capsule 3     Calcium Citrate-Vitamin D (CALCIUM + D PO) Take by mouth daily       celecoxib (CELEBREX) 100 MG capsule TAKE 1 CAPSULE(100 MG) BY MOUTH TWICE DAILY 180 capsule 1     chlorthalidone (HYGROTON) 25 MG tablet TAKE 1/2 TABLET(12.5 MG) BY MOUTH DAILY 45 tablet 3     FLOVENT DISKUS 100 MCG/BLIST inhaler INHALE 1 PUFF INTO THE LUNGS EVERY 12 HOURS 1 each 0     guaiFENesin-codeine (GUAIFENESIN AC) 100-10 MG/5ML syrup Take 5 mLs by mouth every 6 hours as needed for congestion 300 mL 1     guaiFENesin-codeine (GUAIFENESIN AC) 100-10 MG/5ML syrup Take 5 mLs by mouth every 6 hours as needed for congestion 300 mL 0     guaiFENesin-codeine  (ROBITUSSIN AC) 100-10 MG/5ML solution Take 5-10 mLs by mouth every 4 hours as needed for cough 236 mL 0     levothyroxine (SYNTHROID/LEVOTHROID) 100 MCG tablet TAKE 1 TABLET(100 MCG) BY MOUTH DAILY 90 tablet 3     losartan (COZAAR) 100 MG tablet TAKE 1 TABLET(100 MG) BY MOUTH DAILY 90 tablet 1     montelukast (SINGULAIR) 10 MG tablet TAKE 1 TABLET(10 MG) BY MOUTH AT BEDTIME 90 tablet 1     Multiple Vitamins-Minerals (PRESERVISION AREDS PO) Take by mouth daily       omeprazole (PRILOSEC) 20 MG DR capsule Take 1 capsule (20 mg) by mouth daily 90 capsule 0     PROAIR RESPICLICK 108 (90 Base) MCG/ACT inhaler INHALE 1 PUFF INTO THE LUNGS EVERY 6 HOURS AS NEEDED FOR SHORTNESS OF BREATH OR DIFFICULT BREATHING OR WHEEZING 1 Inhaler 3     simvastatin (ZOCOR) 20 MG tablet TAKE 1 TABLET(20 MG) BY MOUTH AT BEDTIME 90 tablet 1     No Known Allergies  Recent Labs   Lab Test 08/17/20  1139 02/03/20  1209 07/22/19  1230 01/09/19  1043 01/11/18  1405 01/23/17  1021   A1C  --   --  5.7*  --   --   --    LDL  --  102*  --  107*  --  95   HDL  --  58  --  65  --  66   TRIG  --  227* 159* 214*  --  143   ALT  --  23 36 36   < > 22   CR  --  0.64 0.63 0.68   < > 0.75   GFRESTIMATED  --  >90 >90 >90   < > 78   GFRESTBLACK  --  >90 >90 >90   < > >90  African American GFR Calc     POTASSIUM  --  3.6 3.5 3.5   < > 3.7   TSH 0.22* <0.01*  --  0.01*   < > 0.02*    < > = values in this interval not displayed.      Pneumonia Vaccine:Adults age 65+ who received Pneumovax (PPSV23) at 65 years or older: Should be given PCV13 > 1 year after their most recent PPSV23  Any new diagnosis of family breast, ovarian, or bowel cancer? No    FHS-7: No flowsheet data found.  click delete button to remove this line now  Mammogram Screening: Recommended mammography every 1-2 years with patient discussion and risk factor consideration  Pertinent mammograms are reviewed under the imaging tab.    Review of Systems   Constitutional: Negative for chills and fever.  "  HENT: Positive for sore throat. Negative for congestion, ear pain and hearing loss.    Eyes: Positive for pain and visual disturbance.   Respiratory: Positive for cough and shortness of breath.    Cardiovascular: Positive for chest pain. Negative for palpitations and peripheral edema.   Gastrointestinal: Negative for abdominal pain, constipation, diarrhea, heartburn, hematochezia and nausea.   Breasts:  Negative for tenderness, breast mass and discharge.   Genitourinary: Positive for frequency and urgency. Negative for dysuria, genital sores, hematuria, pelvic pain, vaginal bleeding and vaginal discharge.   Musculoskeletal: Positive for myalgias. Negative for arthralgias and joint swelling.   Skin: Negative for rash.   Neurological: Positive for dizziness. Negative for weakness, headaches and paresthesias.   Psychiatric/Behavioral: Negative for mood changes. The patient is not nervous/anxious.      Constitutional, HEENT, cardiovascular, pulmonary, gi and gu systems are negative, except as otherwise noted.    OBJECTIVE:   /88   Pulse 62   Temp 97.8  F (36.6  C) (Tympanic)   Resp 18   Ht 1.594 m (5' 2.75\")   Wt 68 kg (150 lb)   LMP  (LMP Unknown)   SpO2 96%   BMI 26.78 kg/m   Estimated body mass index is 26.78 kg/m  as calculated from the following:    Height as of this encounter: 1.594 m (5' 2.75\").    Weight as of this encounter: 68 kg (150 lb).  Physical Exam  GENERAL: healthy, alert and no distress  NECK: no adenopathy, no asymmetry, masses, or scars and thyroid normal to palpation  RESP: lungs clear to auscultation - no rales, rhonchi or wheezes  CV: regular rate and rhythm, normal S1 S2, no S3 or S4, no murmur, click or rub, no peripheral edema and peripheral pulses strong  ABDOMEN: soft, nontender, no hepatosplenomegaly, no masses and bowel sounds normal  MS: no gross musculoskeletal defects noted, no edema        ASSESSMENT / PLAN:   (Z00.00) Health maintenance examination  (primary encounter " diagnosis)  Comment:   Plan: MA SCREENING DIGITAL BILAT - Future  (s+30),         CBC with platelets, Comprehensive metabolic         panel (BMP + Alb, Alk Phos, ALT, AST, Total.         Bili, TP), Lipid panel reflex to direct LDL         Fasting, TSH with free T4 reflex, Adult Gastro         Ref - Procedure Only            (Z12.11) Screen for colon cancer  Comment:   Plan:     (Z12.11) Special screening for malignant neoplasms, colon  Comment:   Plan: Adult Gastro Ref - Procedure Only            (Z12.31) Encounter for screening mammogram for breast cancer  Comment:   Plan: MA SCREENING DIGITAL BILAT - Future  (s+30)            (E78.5) Hyperlipidemia LDL goal <160  Comment:   Plan: Comprehensive metabolic panel (BMP + Alb, Alk         Phos, ALT, AST, Total. Bili, TP), Lipid panel         reflex to direct LDL Fasting            (I10) Benign essential hypertension  Comment: has been controlled stable, will continue monitoring and consider double the dose of thiazide to full tablet.   Plan: Comprehensive metabolic panel (BMP + Alb, Alk         Phos, ALT, AST, Total. Bili, TP), Lipid panel         reflex to direct LDL Fasting            (E03.9) Hypothyroidism, unspecified type  Comment:   Plan: TSH with free T4 reflex            (J42) Chronic bronchitis, unspecified chronic bronchitis type (H)  Comment: has been worsening SOB with cough, will have her to see pulmonology as recommended   Plan: Adult Pulmonary Medicine Referral            (C73) Malignant neoplasm of thyroid gland (H)  Comment:   Plan:     (J20.9) Acute bronchitis with symptoms > 10 days  Comment:   Plan: benzonatate (TESSALON) 100 MG capsule,         guaiFENesin-codeine (GUAIFENESIN AC) 100-10         MG/5ML syrup            (Z20.822) Exposure to 2019 novel coronavirus  Comment: will have her to do COVID PCR for f/u   Plan: Symptomatic; Unknown COVID-19 Virus         (Coronavirus) by PCR              Patient has been advised of split billing requirements  "and indicates understanding: Yes  COUNSELING:  Reviewed preventive health counseling, as reflected in patient instructions    Estimated body mass index is 27.51 kg/m  as calculated from the following:    Height as of 8/4/20: 1.575 m (5' 2\").    Weight as of 7/15/21: 68.2 kg (150 lb 6.4 oz).        She reports that she has never smoked. She has never used smokeless tobacco.      Appropriate preventive services were discussed with this patient, including applicable screening as appropriate for cardiovascular disease, diabetes, osteopenia/osteoporosis, and glaucoma.  As appropriate for age/gender, discussed screening for colorectal cancer, prostate cancer, breast cancer, and cervical cancer. Checklist reviewing preventive services available has been given to the patient.    Reviewed patients plan of care and provided an AVS. The Basic Care Plan (routine screening as documented in Health Maintenance) for Idalia meets the Care Plan requirement. This Care Plan has been established and reviewed with the Patient.    Counseling Resources:  ATP IV Guidelines  Pooled Cohorts Equation Calculator  Breast Cancer Risk Calculator  Breast Cancer: Medication to Reduce Risk  FRAX Risk Assessment  ICSI Preventive Guidelines  Dietary Guidelines for Americans, 2010  [employer]'s MyPlate  ASA Prophylaxis  Lung CA Screening    Akhil Almodovar MD  [facility]    Identified Health Risks:  "

## 2022-01-11 LAB
ALBUMIN SERPL-MCNC: 4.4 G/DL (ref 3.4–5)
ALP SERPL-CCNC: 66 U/L (ref 40–150)
ALT SERPL W P-5'-P-CCNC: 25 U/L (ref 0–50)
ANION GAP SERPL CALCULATED.3IONS-SCNC: 7 MMOL/L (ref 3–14)
AST SERPL W P-5'-P-CCNC: 18 U/L (ref 0–45)
BILIRUB SERPL-MCNC: 0.6 MG/DL (ref 0.2–1.3)
BUN SERPL-MCNC: 11 MG/DL (ref 7–30)
CALCIUM SERPL-MCNC: 9.4 MG/DL (ref 8.5–10.1)
CHLORIDE BLD-SCNC: 101 MMOL/L (ref 94–109)
CHOLEST SERPL-MCNC: 229 MG/DL
CO2 SERPL-SCNC: 27 MMOL/L (ref 20–32)
CREAT SERPL-MCNC: 0.63 MG/DL (ref 0.52–1.04)
FASTING STATUS PATIENT QL REPORTED: YES
GFR SERPL CREATININE-BSD FRML MDRD: >90 ML/MIN/1.73M2
GLUCOSE BLD-MCNC: 114 MG/DL (ref 70–99)
HDLC SERPL-MCNC: 64 MG/DL
LDLC SERPL CALC-MCNC: 122 MG/DL
NONHDLC SERPL-MCNC: 165 MG/DL
POTASSIUM BLD-SCNC: 3.4 MMOL/L (ref 3.4–5.3)
PROT SERPL-MCNC: 8.4 G/DL (ref 6.8–8.8)
SODIUM SERPL-SCNC: 135 MMOL/L (ref 133–144)
T4 FREE SERPL-MCNC: 1.09 NG/DL (ref 0.76–1.46)
TRIGL SERPL-MCNC: 213 MG/DL
TSH SERPL DL<=0.005 MIU/L-ACNC: 25.57 MU/L (ref 0.4–4)

## 2022-01-11 RX ORDER — LEVOTHYROXINE SODIUM 112 UG/1
112 TABLET ORAL DAILY
Qty: 90 TABLET | Refills: 1 | Status: SHIPPED | OUTPATIENT
Start: 2022-01-11 | End: 2022-07-16

## 2022-01-11 RX ORDER — SIMVASTATIN 40 MG
40 TABLET ORAL AT BEDTIME
Qty: 90 TABLET | Refills: 1 | Status: SHIPPED | OUTPATIENT
Start: 2022-01-11 | End: 2022-07-15

## 2022-01-11 ASSESSMENT — PATIENT HEALTH QUESTIONNAIRE - PHQ9: SUM OF ALL RESPONSES TO PHQ QUESTIONS 1-9: 7

## 2022-01-15 ENCOUNTER — LAB (OUTPATIENT)
Dept: URGENT CARE | Facility: URGENT CARE | Age: 67
End: 2022-01-15
Attending: FAMILY MEDICINE
Payer: COMMERCIAL

## 2022-01-15 DIAGNOSIS — Z20.822 EXPOSURE TO 2019 NOVEL CORONAVIRUS: ICD-10-CM

## 2022-01-15 PROCEDURE — U0003 INFECTIOUS AGENT DETECTION BY NUCLEIC ACID (DNA OR RNA); SEVERE ACUTE RESPIRATORY SYNDROME CORONAVIRUS 2 (SARS-COV-2) (CORONAVIRUS DISEASE [COVID-19]), AMPLIFIED PROBE TECHNIQUE, MAKING USE OF HIGH THROUGHPUT TECHNOLOGIES AS DESCRIBED BY CMS-2020-01-R: HCPCS

## 2022-01-15 PROCEDURE — U0005 INFEC AGEN DETEC AMPLI PROBE: HCPCS

## 2022-01-16 LAB — SARS-COV-2 RNA RESP QL NAA+PROBE: NEGATIVE

## 2022-01-18 DIAGNOSIS — J45.40 MODERATE PERSISTENT ASTHMA WITHOUT COMPLICATION: ICD-10-CM

## 2022-01-18 DIAGNOSIS — R05.9 COUGH: ICD-10-CM

## 2022-01-18 NOTE — TELEPHONE ENCOUNTER
Failed protocol.  please route to  team if patient needs an appointment     Halima AGUIRRERN BSN  [facility]  [phone]

## 2022-03-17 DIAGNOSIS — R05.9 COUGH: ICD-10-CM

## 2022-03-17 DIAGNOSIS — J45.40 MODERATE PERSISTENT ASTHMA WITHOUT COMPLICATION: ICD-10-CM

## 2022-04-20 ENCOUNTER — TELEPHONE (OUTPATIENT)
Dept: FAMILY MEDICINE | Facility: CLINIC | Age: 67
End: 2022-04-20

## 2022-04-20 NOTE — LETTER
April 20, 2022      Idalia Howe  [address] [place] [address]        Dear Idalia,    I care about your health and have reviewed your health plan. I have reviewed your medical conditions, medication list, and lab results and am making recommendations based on this review, to better manage your health.    You are in particular need of attention regarding:  -Breast Cancer Screening    I am recommending that you:  -schedule a MAMMOGRAM which is due. We have a mobile mammogram unit that comes to [facility].To schedule please call the clinic at [phone]. Or, you can call [facility] at [phone] to schedule this.  Please disregard this reminder if you have had this exam elsewhere within the last year.  It would be helpful for us to have a copy of your mammogram report in our file so that we can best coordinate your care.    Here is a list of Health Maintenance topics that are due now or due soon:  Health Maintenance Due   Topic Date Due     Breathing Capacity Test  Never done     COPD Action Plan  Never done     Mammogram  02/04/2021     COVID-19 Vaccine (3 - Booster for Pfizer series) 10/15/2021     Colorectal Cancer Screening  01/01/2022       Please call us at [phone] (or use [employer]) to address the above recommendations.     Thank you for trusting [facility] and we appreciate the opportunity to serve you.  We look forward to supporting your healthcare needs in the future.    Healthy Regards,    Akhil Almodovar MD

## 2022-04-20 NOTE — TELEPHONE ENCOUNTER
Patient Quality Outreach    Patient is due for the following:   Breast Cancer Screening - Mammogram    NEXT STEPS:       Type of outreach:    Sent letter.      Questions for provider review:         Rimma Fox CMA

## 2022-07-14 DIAGNOSIS — E78.5 HYPERLIPIDEMIA LDL GOAL <160: ICD-10-CM

## 2022-07-14 DIAGNOSIS — E03.9 HYPOTHYROIDISM, UNSPECIFIED TYPE: ICD-10-CM

## 2022-07-15 RX ORDER — SIMVASTATIN 40 MG
TABLET ORAL
Qty: 90 TABLET | Refills: 1 | Status: SHIPPED | OUTPATIENT
Start: 2022-07-15 | End: 2023-03-23

## 2022-07-15 NOTE — TELEPHONE ENCOUNTER
Routing refill request to provider for review/approval because:  TSH   Date Value Ref Range Status   01/10/2022 25.57 (H) 0.40 - 4.00 mU/L Final   08/17/2020 0.22 (L) 0.40 - 4.00 mU/L Final         Troy Karimi RN  [facility] Triage Nurse

## 2022-07-16 RX ORDER — LEVOTHYROXINE SODIUM 112 UG/1
TABLET ORAL
Qty: 90 TABLET | Refills: 1 | Status: SHIPPED | OUTPATIENT
Start: 2022-07-16 | End: 2023-03-24

## 2022-10-08 DIAGNOSIS — E78.5 HYPERLIPIDEMIA LDL GOAL <160: ICD-10-CM

## 2022-10-08 DIAGNOSIS — E03.9 HYPOTHYROIDISM, UNSPECIFIED TYPE: ICD-10-CM

## 2022-10-08 DIAGNOSIS — I10 BENIGN ESSENTIAL HYPERTENSION: ICD-10-CM

## 2022-10-09 DIAGNOSIS — E78.5 HYPERLIPIDEMIA LDL GOAL <160: ICD-10-CM

## 2022-10-09 DIAGNOSIS — E03.9 HYPOTHYROIDISM, UNSPECIFIED TYPE: ICD-10-CM

## 2022-10-09 DIAGNOSIS — I10 BENIGN ESSENTIAL HYPERTENSION: ICD-10-CM

## 2022-10-11 RX ORDER — CHLORTHALIDONE 25 MG/1
TABLET ORAL
Qty: 45 TABLET | Refills: 0 | Status: SHIPPED | OUTPATIENT
Start: 2022-10-11 | End: 2022-12-14

## 2022-10-11 RX ORDER — LOSARTAN POTASSIUM 100 MG/1
TABLET ORAL
Qty: 90 TABLET | Refills: 1 | Status: SHIPPED | OUTPATIENT
Start: 2022-10-11 | End: 2023-03-17

## 2022-10-11 NOTE — TELEPHONE ENCOUNTER
Left message with pharmacy to Please advise patient to schedule an appointment with PCP for further refills.    Last seen by PCP 1/10/22.    Refilled per [facility] protocol.  Kera Butt RN

## 2022-10-15 ENCOUNTER — HEALTH MAINTENANCE LETTER (OUTPATIENT)
Age: 67
End: 2022-10-15

## 2022-12-07 ENCOUNTER — TELEPHONE (OUTPATIENT)
Dept: FAMILY MEDICINE | Facility: CLINIC | Age: 67
End: 2022-12-07

## 2022-12-07 NOTE — TELEPHONE ENCOUNTER
Called pt with assistance of a Tajik  to assist with scheduling an appt for cough. 12/9 7:40am slot has been taken.    Telephone visit scheduled for Thursday 12/8/22 at 1:10pm (check-in) to speak with Dr. Almodovar.    Niya Oneal,  [facility]

## 2022-12-07 NOTE — TELEPHONE ENCOUNTER
Reason for Call:  Other appointment    Detailed comments: Per patient: She has a cough for over a month, also she has mucus, she will like to get an appointment with the provider. Please call back.     Phone Number Patient can be reached at: Home number on file [phone] (home)    Best Time: Any time     Can we leave a detailed message on this number? YES    Call taken on 12/7/2022 at 12:47 PM by Eloise Olea

## 2022-12-08 ENCOUNTER — VIRTUAL VISIT (OUTPATIENT)
Dept: FAMILY MEDICINE | Facility: CLINIC | Age: 67
End: 2022-12-08
Payer: COMMERCIAL

## 2022-12-08 DIAGNOSIS — J45.40 MODERATE PERSISTENT ASTHMA WITHOUT COMPLICATION: ICD-10-CM

## 2022-12-08 DIAGNOSIS — R06.02 SOB (SHORTNESS OF BREATH): ICD-10-CM

## 2022-12-08 DIAGNOSIS — J20.9 ACUTE BRONCHITIS WITH SYMPTOMS > 10 DAYS: Primary | ICD-10-CM

## 2022-12-08 DIAGNOSIS — J32.9 PURULENT POSTNASAL DRAINAGE: ICD-10-CM

## 2022-12-08 DIAGNOSIS — Z12.11 SCREEN FOR COLON CANCER: ICD-10-CM

## 2022-12-08 PROCEDURE — 99214 OFFICE O/P EST MOD 30 MIN: CPT | Mod: 95 | Performed by: FAMILY MEDICINE

## 2022-12-08 RX ORDER — FLUTICASONE PROPIONATE AND SALMETEROL 250; 50 UG/1; UG/1
1 POWDER RESPIRATORY (INHALATION) EVERY 12 HOURS
OUTPATIENT
Start: 2022-12-08

## 2022-12-08 RX ORDER — MONTELUKAST SODIUM 10 MG/1
TABLET ORAL
Qty: 90 TABLET | Refills: 1 | Status: SHIPPED | OUTPATIENT
Start: 2022-12-08 | End: 2022-12-10

## 2022-12-08 RX ORDER — CODEINE PHOSPHATE/GUAIFENESIN 10-100MG/5
5 LIQUID (ML) ORAL EVERY 6 HOURS PRN
Qty: 300 ML | Refills: 1 | Status: SHIPPED | OUTPATIENT
Start: 2022-12-08 | End: 2022-12-10

## 2022-12-08 RX ORDER — DOXYCYCLINE HYCLATE 100 MG
100 TABLET ORAL 2 TIMES DAILY
Qty: 20 TABLET | Refills: 0 | Status: SHIPPED | OUTPATIENT
Start: 2022-12-08 | End: 2023-03-23

## 2022-12-08 RX ORDER — FLUTICASONE PROPIONATE AND SALMETEROL 250; 50 UG/1; UG/1
1 POWDER RESPIRATORY (INHALATION) EVERY 12 HOURS
Qty: 60 EACH | Refills: 1 | Status: SHIPPED | OUTPATIENT
Start: 2022-12-08 | End: 2023-02-09

## 2022-12-08 NOTE — TELEPHONE ENCOUNTER
This is a duplicate refill request, that has been refused to the pharmacy.   Troy Karimi RN  [facility] Triage Nurse

## 2022-12-08 NOTE — PROGRESS NOTES
"Idalia Howe is a 66 year old who is being evaluated via a billable telephone visit.      What phone number would you like to be contacted at? [phone]  How would you like to obtain your AVS? MyChart    Assessment & Plan     Moderate persistent asthma without complication    - fluticasone-salmeterol (ADVAIR) 250-50 MCG/ACT inhaler; Inhale 1 puff into the lungs every 12 hours    Screen for colon cancer      Purulent postnasal drainage  Worsening again with SOB and cough  Will have her to resume singulair,   Will review xray for further evaluation   - montelukast (SINGULAIR) 10 MG tablet; TAKE 1 TABLET(10 MG) BY MOUTH AT BEDTIME  - XR Chest 2 Views; Future    Acute bronchitis with symptoms > 10 days  Will have her to start abx   - doxycycline hyclate (VIBRA-TABS) 100 MG tablet; Take 1 tablet (100 mg) by mouth 2 times daily  - XR Chest 2 Views; Future    SOB (shortness of breath)    - XR Chest 2 Views; Future         BMI:   Estimated body mass index is 26.78 kg/m  as calculated from the following:    Height as of 1/10/22: 1.594 m (5' 2.75\").    Weight as of 1/10/22: 68 kg (150 lb).   Weight management plan: Discussed healthy diet and exercise guidelines    FUTURE APPOINTMENTS:       - Follow-up visit in 4-6 weeks     No follow-ups on file.    Akhil Almodovar MD  [facility]   Idalia Howe is a 66 year old presenting for the following health issues:  No chief complaint on file.      HPI     Acute Illness  Acute illness concerns: really bad cough, mucus  Onset/Duration: 1 month  Symptoms:  Fever: No  Chills/Sweats: No  Headache (location?): YES  Sinus Pressure: YES  Conjunctivitis:  No  Ear Pain: no  Rhinorrhea: No  Congestion: YES  Sore Throat: No  Cough: YES - mucus  Wheeze: No  Decreased Appetite: No  Nausea: No  Vomiting: No  Diarrhea: No  Dysuria/Freq.: YES  Dysuria or Hematuria: No  Fatigue/Achiness: YES  Sick/Strep Exposure: No  Therapies tried and outcome: tylenol, mucus " relief/chest      Review of Systems   Constitutional, HEENT, cardiovascular, pulmonary, gi and gu systems are negative, except as otherwise noted.      Objective           Vitals:  No vitals were obtained today due to virtual visit.    Physical Exam   healthy, alert and no distress  PSYCH: Alert and oriented times 3; coherent speech, normal   rate and volume, able to articulate logical thoughts, able   to abstract reason, no tangential thoughts, no hallucinations   or delusions  Her affect is normal  RESP: No cough, no audible wheezing, able to talk in full sentences  Remainder of exam unable to be completed due to telephone visits          Phone call duration: 27 minutes

## 2022-12-09 ENCOUNTER — ANCILLARY PROCEDURE (OUTPATIENT)
Dept: GENERAL RADIOLOGY | Facility: CLINIC | Age: 67
End: 2022-12-09
Attending: FAMILY MEDICINE
Payer: COMMERCIAL

## 2022-12-09 PROCEDURE — 71046 X-RAY EXAM CHEST 2 VIEWS: CPT | Mod: TC | Performed by: RADIOLOGY

## 2022-12-10 RX ORDER — CODEINE PHOSPHATE/GUAIFENESIN 10-100MG/5
5 LIQUID (ML) ORAL EVERY 6 HOURS PRN
Qty: 300 ML | Refills: 1 | Status: SHIPPED | OUTPATIENT
Start: 2022-12-10 | End: 2023-03-23

## 2022-12-10 RX ORDER — MONTELUKAST SODIUM 10 MG/1
TABLET ORAL
Qty: 90 TABLET | Refills: 1 | Status: SHIPPED | OUTPATIENT
Start: 2022-12-10 | End: 2023-03-23

## 2022-12-13 DIAGNOSIS — E78.5 HYPERLIPIDEMIA LDL GOAL <160: ICD-10-CM

## 2022-12-13 DIAGNOSIS — E03.9 HYPOTHYROIDISM, UNSPECIFIED TYPE: ICD-10-CM

## 2022-12-13 DIAGNOSIS — I10 BENIGN ESSENTIAL HYPERTENSION: ICD-10-CM

## 2022-12-14 RX ORDER — CHLORTHALIDONE 25 MG/1
TABLET ORAL
Qty: 45 TABLET | Refills: 2 | Status: SHIPPED | OUTPATIENT
Start: 2022-12-14 | End: 2023-03-17

## 2023-02-09 DIAGNOSIS — J45.40 MODERATE PERSISTENT ASTHMA WITHOUT COMPLICATION: ICD-10-CM

## 2023-02-09 RX ORDER — FLUTICASONE PROPIONATE AND SALMETEROL 250; 50 UG/1; UG/1
POWDER RESPIRATORY (INHALATION)
Qty: 60 EACH | Refills: 1 | Status: SHIPPED | OUTPATIENT
Start: 2023-02-09 | End: 2023-03-08

## 2023-02-20 ENCOUNTER — TELEPHONE (OUTPATIENT)
Dept: FAMILY MEDICINE | Facility: CLINIC | Age: 68
End: 2023-02-20

## 2023-02-20 NOTE — TELEPHONE ENCOUNTER
Needs of attention regarding:  -Depression  -Breast Cancer Screening  -Colon Cancer Screening  -Wellness (Physical) Visit     Health Maintenance Topics with due status: Overdue       Topic Date Due    SPIROMETRY Never done    COPD ACTION PLAN Never done    Pneumococcal Vaccine: 65+ Years 08/28/2019    MAMMO SCREENING 02/04/2021    COLORECTAL CANCER SCREENING 01/01/2022    DTAP/TDAP/TD IMMUNIZATION 06/06/2022    ANNUAL REVIEW OF HM ORDERS 07/14/2022    PHQ-2 (once per calendar year) 01/01/2023    MEDICARE ANNUAL WELLNESS VISIT 01/10/2023    FALL RISK ASSESSMENT 01/10/2023       Communication:  Patient called - message left

## 2023-03-08 DIAGNOSIS — J45.40 MODERATE PERSISTENT ASTHMA WITHOUT COMPLICATION: ICD-10-CM

## 2023-03-08 RX ORDER — FLUTICASONE PROPIONATE AND SALMETEROL 250; 50 UG/1; UG/1
POWDER RESPIRATORY (INHALATION)
Qty: 180 EACH | Refills: 0 | Status: SHIPPED | OUTPATIENT
Start: 2023-03-08 | End: 2023-06-12

## 2023-03-17 DIAGNOSIS — E78.5 HYPERLIPIDEMIA LDL GOAL <160: ICD-10-CM

## 2023-03-17 DIAGNOSIS — E03.9 HYPOTHYROIDISM, UNSPECIFIED TYPE: ICD-10-CM

## 2023-03-17 DIAGNOSIS — I10 BENIGN ESSENTIAL HYPERTENSION: ICD-10-CM

## 2023-03-17 RX ORDER — LOSARTAN POTASSIUM 100 MG/1
TABLET ORAL
Qty: 90 TABLET | OUTPATIENT
Start: 2023-03-17

## 2023-03-17 RX ORDER — CHLORTHALIDONE 25 MG/1
TABLET ORAL
Qty: 45 TABLET | OUTPATIENT
Start: 2023-03-17

## 2023-03-20 ENCOUNTER — TELEPHONE (OUTPATIENT)
Dept: FAMILY MEDICINE | Facility: CLINIC | Age: 68
End: 2023-03-20

## 2023-03-20 NOTE — TELEPHONE ENCOUNTER
General Call    Contacts       Type Contact Phone/Fax    03/20/2023 01:47 PM CDT Phone (Incoming) Idalia Howe (Self) [phone] (M)        Reason for Call:  Pt wanted to see only Dr. Almodovar for coughing, uti and med for going out of country on 3/28. There was nothing avail in person or virtual visit for the pt.    What are your questions or concerns:  Need to see Dr. Almodovar before going out of the country    Date of last appointment with provider: 12/8/22    Could we send this information to you in [employer] or would you prefer to receive a phone call?:   Patient would prefer a phone call   Okay to leave a detailed message?: Yes at Cell number on file:    Telephone Information:   Mobile [phone]

## 2023-03-23 ENCOUNTER — OFFICE VISIT (OUTPATIENT)
Dept: FAMILY MEDICINE | Facility: CLINIC | Age: 68
End: 2023-03-23
Payer: MEDICARE

## 2023-03-23 VITALS
HEIGHT: 62 IN | RESPIRATION RATE: 12 BRPM | DIASTOLIC BLOOD PRESSURE: 68 MMHG | BODY MASS INDEX: 26.94 KG/M2 | OXYGEN SATURATION: 97 % | SYSTOLIC BLOOD PRESSURE: 128 MMHG | HEART RATE: 60 BPM | TEMPERATURE: 97.4 F | WEIGHT: 146.4 LBS

## 2023-03-23 DIAGNOSIS — E78.5 HYPERLIPIDEMIA LDL GOAL <160: ICD-10-CM

## 2023-03-23 DIAGNOSIS — C73 MALIGNANT NEOPLASM OF THYROID GLAND (H): ICD-10-CM

## 2023-03-23 DIAGNOSIS — J32.9 PURULENT POSTNASAL DRAINAGE: ICD-10-CM

## 2023-03-23 DIAGNOSIS — R39.9 UTI SYMPTOMS: Primary | ICD-10-CM

## 2023-03-23 DIAGNOSIS — E89.0 POSTOPERATIVE HYPOTHYROIDISM: ICD-10-CM

## 2023-03-23 DIAGNOSIS — J45.41 MODERATE PERSISTENT ASTHMA WITH ACUTE EXACERBATION: ICD-10-CM

## 2023-03-23 DIAGNOSIS — I10 BENIGN ESSENTIAL HYPERTENSION: ICD-10-CM

## 2023-03-23 LAB
ALBUMIN UR-MCNC: NEGATIVE MG/DL
APPEARANCE UR: CLEAR
BACTERIA #/AREA URNS HPF: ABNORMAL /HPF
BILIRUB UR QL STRIP: NEGATIVE
COLOR UR AUTO: YELLOW
GLUCOSE UR STRIP-MCNC: NEGATIVE MG/DL
HGB UR QL STRIP: NEGATIVE
KETONES UR STRIP-MCNC: NEGATIVE MG/DL
LEUKOCYTE ESTERASE UR QL STRIP: ABNORMAL
MUCOUS THREADS #/AREA URNS LPF: PRESENT /LPF
NITRATE UR QL: NEGATIVE
PH UR STRIP: 6.5 [PH] (ref 5–7)
RBC #/AREA URNS AUTO: ABNORMAL /HPF
SP GR UR STRIP: 1.02 (ref 1–1.03)
SQUAMOUS #/AREA URNS AUTO: ABNORMAL /LPF
UROBILINOGEN UR STRIP-ACNC: 0.2 E.U./DL
WBC #/AREA URNS AUTO: ABNORMAL /HPF
WBC CLUMPS #/AREA URNS HPF: PRESENT /HPF

## 2023-03-23 PROCEDURE — 81001 URINALYSIS AUTO W/SCOPE: CPT | Performed by: PHYSICIAN ASSISTANT

## 2023-03-23 PROCEDURE — 84439 ASSAY OF FREE THYROXINE: CPT | Performed by: PHYSICIAN ASSISTANT

## 2023-03-23 PROCEDURE — 36415 COLL VENOUS BLD VENIPUNCTURE: CPT | Performed by: PHYSICIAN ASSISTANT

## 2023-03-23 PROCEDURE — 80053 COMPREHEN METABOLIC PANEL: CPT | Performed by: PHYSICIAN ASSISTANT

## 2023-03-23 PROCEDURE — 84443 ASSAY THYROID STIM HORMONE: CPT | Performed by: PHYSICIAN ASSISTANT

## 2023-03-23 PROCEDURE — 80061 LIPID PANEL: CPT | Performed by: PHYSICIAN ASSISTANT

## 2023-03-23 PROCEDURE — 87086 URINE CULTURE/COLONY COUNT: CPT | Performed by: PHYSICIAN ASSISTANT

## 2023-03-23 PROCEDURE — 99214 OFFICE O/P EST MOD 30 MIN: CPT | Performed by: PHYSICIAN ASSISTANT

## 2023-03-23 RX ORDER — SIMVASTATIN 40 MG
40 TABLET ORAL AT BEDTIME
Qty: 90 TABLET | Refills: 1 | Status: SHIPPED | OUTPATIENT
Start: 2023-03-23 | End: 2023-06-13

## 2023-03-23 RX ORDER — ALBUTEROL SULFATE 90 UG/1
POWDER, METERED RESPIRATORY (INHALATION)
Qty: 1 EACH | Refills: 3 | Status: SHIPPED | OUTPATIENT
Start: 2023-03-23

## 2023-03-23 RX ORDER — FLUTICASONE PROPIONATE 50 MCG
1 SPRAY, SUSPENSION (ML) NASAL AT BEDTIME
Qty: 16 ML | Refills: 3 | Status: SHIPPED | OUTPATIENT
Start: 2023-03-23

## 2023-03-23 RX ORDER — MONTELUKAST SODIUM 10 MG/1
TABLET ORAL
Qty: 90 TABLET | Refills: 1 | Status: SHIPPED | OUTPATIENT
Start: 2023-03-23 | End: 2023-09-18

## 2023-03-23 RX ORDER — LOSARTAN POTASSIUM 100 MG/1
100 TABLET ORAL DAILY
Qty: 90 TABLET | Refills: 1 | Status: SHIPPED | OUTPATIENT
Start: 2023-03-23 | End: 2023-05-31

## 2023-03-23 RX ORDER — LEVOTHYROXINE SODIUM 112 UG/1
112 TABLET ORAL DAILY
Qty: 90 TABLET | Status: CANCELLED | OUTPATIENT
Start: 2023-03-23

## 2023-03-23 RX ORDER — CHLORTHALIDONE 25 MG/1
12.5 TABLET ORAL DAILY
Qty: 45 TABLET | Refills: 1 | Status: SHIPPED | OUTPATIENT
Start: 2023-03-23 | End: 2023-05-31

## 2023-03-23 RX ORDER — BENZONATATE 100 MG/1
100 CAPSULE ORAL 3 TIMES DAILY PRN
Qty: 60 CAPSULE | Refills: 0 | Status: SHIPPED | OUTPATIENT
Start: 2023-03-23 | End: 2023-12-06

## 2023-03-23 ASSESSMENT — PAIN SCALES - GENERAL: PAINLEVEL: MILD PAIN (2)

## 2023-03-23 NOTE — PROGRESS NOTES
Assessment & Plan     UTI symptoms  Patient with dysuria, frequency and urgency for the last 4 days, UA suspicious for possible UTI, UC is pending. Will start augmentin (chosen in an effort to also cover for possible bronchitis/sinusitis/asthma exacerbation symptoms). Push fluids.   - UA with Microscopic reflex to Culture - lab collect  - UA Microscopic with Reflex to Culture  - Urine Culture Aerobic Bacterial - lab collect  - add amoxicillin-clavulanate (AUGMENTIN) 875-125 MG tablet  Dispense: 14 tablet; Refill: 0    Moderate persistent asthma with acute exacerbation  Has had issues with chronic bronchitis and cough for 30+ years per patient with an increase in cough over the last 2 months. She declines CXR today. Vitals are stable and lungs are clear on exam. We reviewed her medications and she has been using her wixela twice daily regularly, but does not have an albuterol inhaler on hand and therefore has not been using this - I have given her a refill and instructed her on appropriate use. She is also out of singulair and requests a refill, this was done. I have started her on augmentin for above possible UTI in hopes that it would also cover for a bronchitis/asthma exacerbation and/or sinusitis given her post nasal drainage and congestion. I have also recommended starting flonase at bedtime to help with post nasal drainage. Tessalon prescribed for cough.  - refill montelukast (SINGULAIR) 10 MG tablet  Dispense: 90 tablet; Refill: 1  - restart/refill albuterol (PROAIR RESPICLICK) 108 (90 Base) MCG/ACT inhaler  Dispense: 1 each; Refill: 3  - add amoxicillin-clavulanate (AUGMENTIN) 875-125 MG tablet  Dispense: 14 tablet; Refill: 0  - add benzonatate (TESSALON) 100 MG capsule  Dispense: 60 capsule; Refill: 0    Purulent postnasal drainage  Likely contributing to cough. Add flonase, continue singulair   - refill montelukast (SINGULAIR) 10 MG tablet  Dispense: 90 tablet; Refill: 1  - add fluticasone (FLONASE) 50  MCG/ACT nasal spray  Dispense: 16 mL; Refill: 3    Postoperative hypothyroidism  Malignant neoplasm of thyroid gland (H)  S/P thyroidectomy for thyroid cancer in 2004.   Synthroid was increased from 100 to 112 mcg in 1/2022 given downtrending T4 and persistently elevated TSH. Now with low TSH and high T4 - will decrease synthyroid back to 100 mcg and recheck thyroid labs in 6-8 weeks.   - TSH WITH FREE T4 REFLEX  - T4 free  - decrease from 112 mcg to levothyroxine (SYNTHROID/LEVOTHROID) 100 MCG tablet  Dispense: 90 tablet; Refill: 1    Benign essential hypertension  Well controlled with current management - no changes made. Refills given.  - Comprehensive metabolic panel (BMP + Alb, Alk Phos, ALT, AST, Total. Bili, TP)  - refill chlorthalidone (HYGROTON) 25 MG tablet  Dispense: 45 tablet; Refill: 1  - refill losartan (COZAAR) 100 MG tablet  Dispense: 90 tablet; Refill: 1    Hyperlipidemia LDL goal <160  Simvastatin was increased in 1/2022 to 40 mg daily. Fasting lipids rechecked today, improved from last year. Continue same dose of simvastatin.  - Comprehensive metabolic panel (BMP + Alb, Alk Phos, ALT, AST, Total. Bili, TP)  - Lipid panel reflex to direct LDL Fasting  - refill simvastatin (ZOCOR) 40 MG tablet  Dispense: 90 tablet; Refill: 1    35 minutes spent on the date of the encounter doing chart review, history and exam, documentation and further activities per the note     Follow up with Dr. Almodovar upon return from vacation for physical exam and recheck thyroid labs after synthroid dose change    OSVALDO Reddy [facility] is a 67 year old, presenting for the following health issues:  Cough (Cough with mucus.  ), UTI, and Refill Request  No flowsheet data found.  History of Present Illness       Reason for visit:  Cough  Symptom onset:  More than a month  Symptoms include:  Cough  Symptom intensity:  Mild  Symptom progression:  Staying the same  Had these  "symptoms before:  Yes  Has tried/received treatment for these symptoms:  Yes  Previous treatment was successful:  No  What makes it worse:  Yes  What makes it better:  Maybe    She eats 2-3 servings of fruits and vegetables daily.She consumes 1 sweetened beverage(s) daily.She exercises with enough effort to increase her heart rate 10 to 19 minutes per day.  She exercises with enough effort to increase her heart rate 3 or less days per week.   She is taking medications regularly.     UTI symptoms  A few days of burning, urgency and frequency. History of UTIs, last 3 years ago. Symptoms feel similar.  No flank pain, fevers or hematuria.    Cough/Asthma/Chronic bronchitis - 30 years of cough issues that wax and wane. She is on wixela inhaler only. Does not have albuterol inhaler at home. Needs refill of singulair. She notes lots of mucous and post nasal drainage. Allergy symptoms.    Hyperlipidemia - on simvastatin 40 mg daily, this was increased 1/2022    Hypothyroidism s/p thyroidectomy 2004 for thyroid cancer - on levothyroxine 112 mcg daily     Hypertension - on chlorthalidone and losartan, tolerating well. Needs refills    Going to [place] next week, will be gone for 1 month     Review of Systems   Constitutional, HEENT, cardiovascular, pulmonary, gi and gu systems are negative, except as otherwise noted.      Objective    /68   Pulse 60   Temp 97.4  F (36.3  C) (Tympanic)   Resp 12   Ht 1.563 m (5' 1.54\")   Wt 66.4 kg (146 lb 6.4 oz)   LMP  (LMP Unknown)   SpO2 97%   BMI 27.18 kg/m    Body mass index is 27.18 kg/m .  Physical Exam   GENERAL: healthy, alert and no distress  EYES: Eyes grossly normal to inspection, PERRL and conjunctivae and sclerae normal  HENT: ear canals and TM's normal, nose and mouth without ulcers or lesions. Post nasal drainage noted in posterior pharynx  NECK: no adenopathy, no asymmetry, masses  RESP: lungs clear to auscultation - no rales, rhonchi or wheezes  CV: regular " rate and rhythm, normal S1 S2, no S3 or S4, no murmur, click or rub, no peripheral edema  MS: no gross musculoskeletal defects noted, no edema  SKIN: no suspicious lesions or rashes on exposed skin  NEURO: Normal strength and tone, mentation intact and speech normal  PSYCH: mentation appears normal, affect normal/bright    No results found for this or any previous visit (from the past 24 hour(s)).

## 2023-03-24 LAB
ALBUMIN SERPL BCG-MCNC: 4.3 G/DL (ref 3.5–5.2)
ALP SERPL-CCNC: 53 U/L (ref 35–104)
ALT SERPL W P-5'-P-CCNC: 22 U/L (ref 10–35)
ANION GAP SERPL CALCULATED.3IONS-SCNC: 20 MMOL/L (ref 7–15)
AST SERPL W P-5'-P-CCNC: 33 U/L (ref 10–35)
BILIRUB SERPL-MCNC: 0.4 MG/DL
BUN SERPL-MCNC: 17 MG/DL (ref 8–23)
CALCIUM SERPL-MCNC: 9.6 MG/DL (ref 8.8–10.2)
CHLORIDE SERPL-SCNC: 103 MMOL/L (ref 98–107)
CHOLEST SERPL-MCNC: 194 MG/DL
CREAT SERPL-MCNC: 0.63 MG/DL (ref 0.51–0.95)
DEPRECATED HCO3 PLAS-SCNC: 20 MMOL/L (ref 22–29)
GFR SERPL CREATININE-BSD FRML MDRD: >90 ML/MIN/1.73M2
GLUCOSE SERPL-MCNC: 88 MG/DL (ref 70–99)
HDLC SERPL-MCNC: 61 MG/DL
LDLC SERPL CALC-MCNC: 104 MG/DL
NONHDLC SERPL-MCNC: 133 MG/DL
POTASSIUM SERPL-SCNC: 4.2 MMOL/L (ref 3.4–5.3)
PROT SERPL-MCNC: 7.9 G/DL (ref 6.4–8.3)
SODIUM SERPL-SCNC: 143 MMOL/L (ref 136–145)
T4 FREE SERPL-MCNC: 2.15 NG/DL (ref 0.9–1.7)
TRIGL SERPL-MCNC: 144 MG/DL
TSH SERPL DL<=0.005 MIU/L-ACNC: 0.01 UIU/ML (ref 0.3–4.2)

## 2023-03-24 RX ORDER — LEVOTHYROXINE SODIUM 100 UG/1
100 TABLET ORAL DAILY
Qty: 90 TABLET | Refills: 1 | Status: SHIPPED | OUTPATIENT
Start: 2023-03-24 | End: 2023-09-18

## 2023-03-25 LAB — BACTERIA UR CULT: NO GROWTH

## 2023-03-26 ENCOUNTER — HEALTH MAINTENANCE LETTER (OUTPATIENT)
Age: 68
End: 2023-03-26

## 2023-05-30 DIAGNOSIS — I10 BENIGN ESSENTIAL HYPERTENSION: ICD-10-CM

## 2023-05-31 DIAGNOSIS — I10 BENIGN ESSENTIAL HYPERTENSION: ICD-10-CM

## 2023-05-31 RX ORDER — CHLORTHALIDONE 25 MG/1
TABLET ORAL
Qty: 45 TABLET | Refills: 1 | Status: SHIPPED | OUTPATIENT
Start: 2023-05-31 | End: 2023-12-04

## 2023-05-31 RX ORDER — LOSARTAN POTASSIUM 100 MG/1
TABLET ORAL
Qty: 90 TABLET | Refills: 1 | Status: SHIPPED | OUTPATIENT
Start: 2023-05-31 | End: 2023-12-04

## 2023-05-31 NOTE — TELEPHONE ENCOUNTER
Prescription approved per [facility] Refill Protocol.  Za Waters, RN  [facility] Triage Nurse

## 2023-05-31 NOTE — TELEPHONE ENCOUNTER
Prescription approved per [facility] Refill Protocol.  Za Waters, RN  [facility] Triage Nurse

## 2023-06-01 ENCOUNTER — HEALTH MAINTENANCE LETTER (OUTPATIENT)
Age: 68
End: 2023-06-01

## 2023-06-09 ENCOUNTER — OFFICE VISIT (OUTPATIENT)
Dept: FAMILY MEDICINE | Facility: CLINIC | Age: 68
End: 2023-06-09
Payer: MEDICARE

## 2023-06-09 VITALS
WEIGHT: 145 LBS | DIASTOLIC BLOOD PRESSURE: 74 MMHG | TEMPERATURE: 97 F | HEART RATE: 64 BPM | RESPIRATION RATE: 15 BRPM | OXYGEN SATURATION: 98 % | BODY MASS INDEX: 27.38 KG/M2 | HEIGHT: 61 IN | SYSTOLIC BLOOD PRESSURE: 126 MMHG

## 2023-06-09 DIAGNOSIS — N95.2 ATROPHIC VAGINITIS: ICD-10-CM

## 2023-06-09 DIAGNOSIS — R05.3 CHRONIC COUGH: ICD-10-CM

## 2023-06-09 DIAGNOSIS — J32.9 PURULENT POSTNASAL DRAINAGE: ICD-10-CM

## 2023-06-09 DIAGNOSIS — J45.41 MODERATE PERSISTENT ASTHMA WITH ACUTE EXACERBATION: ICD-10-CM

## 2023-06-09 DIAGNOSIS — N30.00 ACUTE CYSTITIS WITHOUT HEMATURIA: ICD-10-CM

## 2023-06-09 DIAGNOSIS — K21.00 GASTROESOPHAGEAL REFLUX DISEASE WITH ESOPHAGITIS, UNSPECIFIED WHETHER HEMORRHAGE: Primary | ICD-10-CM

## 2023-06-09 DIAGNOSIS — E78.5 HYPERLIPIDEMIA LDL GOAL <160: ICD-10-CM

## 2023-06-09 DIAGNOSIS — R30.0 DYSURIA: ICD-10-CM

## 2023-06-09 DIAGNOSIS — Z28.39 HEPATITIS B VACCINATION NOT UP TO DATE: ICD-10-CM

## 2023-06-09 DIAGNOSIS — J42 CHRONIC BRONCHITIS, UNSPECIFIED CHRONIC BRONCHITIS TYPE (H): ICD-10-CM

## 2023-06-09 DIAGNOSIS — C73 MALIGNANT NEOPLASM OF THYROID GLAND (H): ICD-10-CM

## 2023-06-09 LAB
ALBUMIN UR-MCNC: NEGATIVE MG/DL
APPEARANCE UR: CLEAR
BACTERIA #/AREA URNS HPF: ABNORMAL /HPF
BILIRUB UR QL STRIP: NEGATIVE
COLOR UR AUTO: YELLOW
GLUCOSE UR STRIP-MCNC: NEGATIVE MG/DL
HGB UR QL STRIP: NEGATIVE
KETONES UR STRIP-MCNC: NEGATIVE MG/DL
LEUKOCYTE ESTERASE UR QL STRIP: ABNORMAL
MUCOUS THREADS #/AREA URNS LPF: PRESENT /LPF
NITRATE UR QL: POSITIVE
PH UR STRIP: 6.5 [PH] (ref 5–7)
RBC #/AREA URNS AUTO: ABNORMAL /HPF
SP GR UR STRIP: 1.02 (ref 1–1.03)
SQUAMOUS #/AREA URNS AUTO: ABNORMAL /LPF
UROBILINOGEN UR STRIP-ACNC: 0.2 E.U./DL
WBC #/AREA URNS AUTO: ABNORMAL /HPF

## 2023-06-09 PROCEDURE — 90677 PCV20 VACCINE IM: CPT | Performed by: FAMILY MEDICINE

## 2023-06-09 PROCEDURE — G0009 ADMIN PNEUMOCOCCAL VACCINE: HCPCS | Performed by: FAMILY MEDICINE

## 2023-06-09 PROCEDURE — 99214 OFFICE O/P EST MOD 30 MIN: CPT | Mod: 25 | Performed by: FAMILY MEDICINE

## 2023-06-09 PROCEDURE — 87086 URINE CULTURE/COLONY COUNT: CPT | Performed by: FAMILY MEDICINE

## 2023-06-09 PROCEDURE — G0010 ADMIN HEPATITIS B VACCINE: HCPCS | Performed by: FAMILY MEDICINE

## 2023-06-09 PROCEDURE — 81001 URINALYSIS AUTO W/SCOPE: CPT | Performed by: FAMILY MEDICINE

## 2023-06-09 PROCEDURE — 90746 HEPB VACCINE 3 DOSE ADULT IM: CPT | Performed by: FAMILY MEDICINE

## 2023-06-09 PROCEDURE — 87186 SC STD MICRODIL/AGAR DIL: CPT | Performed by: FAMILY MEDICINE

## 2023-06-09 RX ORDER — NITROFURANTOIN 25; 75 MG/1; MG/1
100 CAPSULE ORAL 2 TIMES DAILY
Qty: 14 CAPSULE | Refills: 0 | Status: SHIPPED | OUTPATIENT
Start: 2023-06-09 | End: 2023-12-06

## 2023-06-09 RX ORDER — AZELASTINE 1 MG/ML
1 SPRAY, METERED NASAL 2 TIMES DAILY
Qty: 30 ML | Refills: 1 | Status: SHIPPED | OUTPATIENT
Start: 2023-06-09 | End: 2024-04-01

## 2023-06-09 ASSESSMENT — ENCOUNTER SYMPTOMS: COUGH: 1

## 2023-06-09 ASSESSMENT — PAIN SCALES - GENERAL: PAINLEVEL: MODERATE PAIN (4)

## 2023-06-09 NOTE — PROGRESS NOTES
"  Assessment & Plan     Moderate persistent asthma with acute exacerbation  Has been stable, keep monitoring     Gastroesophageal reflux disease with esophagitis, unspecified whether hemorrhage  Worsening with cough and SOB for over past 3-4 months, will have her to check on EGD    Chronic cough  Mentioned above   - Adult GI  Referral - Procedure Only; Future    Hyperlipidemia LDL goal <160  Stable  Keep monitoring     Dysuria  Worsening with cough for over past 3-4 months, her UA showed sign of infection ,will treat with abx  - UA with Microscopic reflex to Culture - lab collect; Future    Atrophic vaginitis  Worsening frequent UTI, will have her to try topical premarin   - conjugated estrogens (PREMARIN) 0.625 MG/GM vaginal cream; Place 0.5 g vaginally once a week    Hepatitis B vaccination not up to date      Chronic bronchitis, unspecified chronic bronchitis type (H)  Worsening as mentioned above over past 3-4months,   Will have her to check on EGD as mentioned above     Malignant neoplasm of thyroid gland (H)  Stable     Purulent postnasal drainage  Will have her to try for cough associated with postnasal drainage  - azelastine (ASTELIN) 0.1 % nasal spray; Spray 1 spray into both nostrils 2 times daily           BMI:   Estimated body mass index is 27.4 kg/m  as calculated from the following:    Height as of this encounter: 1.549 m (5' 1\").    Weight as of this encounter: 65.8 kg (145 lb).   Weight management plan: Patient referred to endocrine and/or weight management specialty    FUTURE APPOINTMENTS:       - Follow-up visit in 6 months TYSHAWN Almodovar MD  [facility]    Syeda Friedman is a 67 year old, presenting for the following health issues:  Urinary Problem (Frequent urination ) and Chronic Cough (Follow up)        6/9/2023     8:53 AM   Additional Questions   Roomed by Sherron Urbano    History of Present Illness       Hyperlipidemia:  She presents for " "follow up of hyperlipidemia.  She is taking medication to lower cholesterol. She is not having myalgia or other side effects to statin medications.    Hypertension: She presents for follow up of hypertension.  She does check blood pressure  regularly outside of the clinic. Outside blood pressures have been over 140/90. She follows a low salt diet.     She eats 0-1 servings of fruits and vegetables daily.She consumes 1 sweetened beverage(s) daily.She exercises with enough effort to increase her heart rate 20 to 29 minutes per day.  She exercises with enough effort to increase her heart rate 4 days per week.   She is taking medications regularly.         Review of Systems   Respiratory: Positive for cough.             Objective    /74   Pulse 64   Temp 97  F (36.1  C) (Tympanic)   Resp 15   Ht 1.549 m (5' 1\")   Wt 65.8 kg (145 lb)   LMP  (LMP Unknown)   SpO2 98%   BMI 27.40 kg/m    Body mass index is 27.4 kg/m .  Physical Exam   GENERAL: healthy, alert and no distress  EYES: Eyes grossly normal to inspection, PERRL and conjunctivae and sclerae normal  HENT: ear canals and TM's normal, nose and mouth without ulcers or lesions  NECK: no adenopathy, no asymmetry, masses, or scars and thyroid normal to palpation  RESP: lungs clear to auscultation - no rales, rhonchi or wheezes  CV: regular rate and rhythm, normal S1 S2, no S3 or S4, no murmur, click or rub, no peripheral edema and peripheral pulses strong  ABDOMEN: soft, nontender, no hepatosplenomegaly, no masses and bowel sounds normal  MS: no gross musculoskeletal defects noted, no edema  SKIN: no suspicious lesions or rashes  NEURO: Normal strength and tone, mentation intact and speech normal  BACK: no CVA tenderness, no paralumbar tenderness  PSYCH: mentation appears normal, affect normal/bright  LYMPH: no cervical, supraclavicular, axillary, or inguinal adenopathy                    "

## 2023-06-11 LAB — BACTERIA UR CULT: ABNORMAL

## 2023-06-12 DIAGNOSIS — J45.40 MODERATE PERSISTENT ASTHMA WITHOUT COMPLICATION: ICD-10-CM

## 2023-06-12 RX ORDER — FLUTICASONE PROPIONATE AND SALMETEROL 250; 50 UG/1; UG/1
POWDER RESPIRATORY (INHALATION)
Qty: 180 EACH | Refills: 1 | Status: SHIPPED | OUTPATIENT
Start: 2023-06-12 | End: 2023-12-06

## 2023-06-13 ENCOUNTER — TELEPHONE (OUTPATIENT)
Dept: FAMILY MEDICINE | Facility: CLINIC | Age: 68
End: 2023-06-13
Payer: MEDICARE

## 2023-06-13 DIAGNOSIS — E78.5 HYPERLIPIDEMIA LDL GOAL <160: ICD-10-CM

## 2023-06-13 RX ORDER — SIMVASTATIN 40 MG
TABLET ORAL
Qty: 90 TABLET | Refills: 1 | Status: SHIPPED | OUTPATIENT
Start: 2023-06-13 | End: 2024-03-12

## 2023-06-13 NOTE — TELEPHONE ENCOUNTER
Needs of attention regarding:  -Breast Cancer Screening  -Colon Cancer Screening  -Wellness (Physical) Visit     Health Maintenance Topics with due status: Overdue       Topic Date Due    SPIROMETRY Never done    COPD ACTION PLAN Never done    COLORECTAL CANCER SCREENING 01/01/2015    MAMMO SCREENING 02/04/2021    DTAP/TDAP/TD IMMUNIZATION 06/06/2022    ANNUAL REVIEW OF HM ORDERS 07/14/2022    MEDICARE ANNUAL WELLNESS VISIT 01/10/2023    COVID-19 Vaccine 03/06/2023       Communication:  Patient called - message left

## 2023-06-13 NOTE — LETTER
June 19, 2023      Idalia Howe  [address] [place] [address]        Dear Idalia,    I care about your health and have reviewed your health plan. I have reviewed your medical conditions, medication list, and lab results and am making recommendations based on this review, to better manage your health.    You are in particular need of attention regarding:  -Breast Cancer Screening  -Colon Cancer Screening  -Wellness (Physical) Visit     I am recommending that you:  -schedule a WELLNESS (Physical) APPOINTMENT with me.   I will check fasting labs the same day - nothing to eat except water and meds for 8-10 hours prior. (If you go elsewhere for Wellness visits then please disregard this reminder.)    -schedule a MAMMOGRAM which is due.    1 in 8 women will develop invasive breast cancer during her lifetime and it is the most common non-skin cancer in American women.  EARLY detection, new treatments, and a better understanding of the disease have increased survival rates - the 5 year survival rate in the 1960s was 63% and today it is close to 90%.    If you are under/uninsured, we recommend you contact the [facility]. They offer mammograms at no charge or on a sliding fee charge. You can schedule with them at [phone]. Please have them send us the results.      Please disregard this reminder if you have had this exam elsewhere within the last year.  It would be helpful for us to have a copy of your mammogram report in your file so that we can best coordinate your care - please contact us with when your test was done so we can update your record.             -schedule a COLONOSCOPY to look for colon cancer (due every 10 years or 5 years in higher risk situations.)        Colon cancer is now the second leading cause of cancer-related deaths in the [place] for both men and women and there are over 130,000 new cases and 50,000 deaths per year from colon cancer.  Colonoscopies can prevent 90-95% of these  deaths.  Problem lesions can be removed before they ever become cancer.  This test is not only looking for cancer, but also getting rid of precancerious lesions.    If you are under/uninsured, we recommend you contact the [facility] program. [facility] is a free colorectal cancer screening program that provides colonoscopies for eligible under/uninsured [place] men and women. If you are interested in receiving a free colonoscopy, please call [facility] at [phone] (mention code [employer]) to see if you re eligible.      If you do not wish to do a colonoscopy or cannot afford to do one, at this time, there is another option. It is called a FIT test or Fecal Immunochemical Occult Blood Test (take home stool sample kit).  It does not replace the colonoscopy for colorectal cancer screening, but it can detect hidden bleeding in the lower colon.  It does need to be repeated every year and if a positive result is obtained, you would be referred for a colonoscopy.          If you have completed either one of these tests at another facility, please call with the details of when and where the tests were done and if they were normal or not. Or have the records sent to our clinic so that we can best coordinate your care.    Here is a list of Health Maintenance topics that are due now or due soon:  Health Maintenance Due   Topic Date Due    SPIROMETRY  Never done    COPD ACTION PLAN  Never done    COLORECTAL CANCER SCREENING  01/01/2015    MAMMO SCREENING  02/04/2021    DTAP/TDAP/TD IMMUNIZATION (2 - Td or Tdap) 06/06/2022    ANNUAL REVIEW OF HM ORDERS  07/14/2022    MEDICARE ANNUAL WELLNESS VISIT  01/10/2023    COVID-19 Vaccine (4 - Pfizer series) 03/06/2023        Please call us (or use [employer]) to address the above recommendations.     Thank you for trusting [facility] and we appreciate the opportunity to serve you.  We look forward to supporting your healthcare needs in the future.    Healthy  Regards,      Akhil Almodovar MD.

## 2023-06-26 ENCOUNTER — TELEPHONE (OUTPATIENT)
Dept: GASTROENTEROLOGY | Facility: CLINIC | Age: 68
End: 2023-06-26
Payer: MEDICARE

## 2023-06-26 ENCOUNTER — TELEPHONE (OUTPATIENT)
Dept: FAMILY MEDICINE | Facility: CLINIC | Age: 68
End: 2023-06-26

## 2023-06-26 NOTE — TELEPHONE ENCOUNTER
Needs of attention regarding:  -Breast Cancer Screening  -Colon Cancer Screening  -Wellness (Physical) Visit     Health Maintenance Topics with due status: Overdue       Topic Date Due    SPIROMETRY Never done    COPD ACTION PLAN Never done    COLORECTAL CANCER SCREENING 01/01/2015    MAMMO SCREENING 02/04/2021    DTAP/TDAP/TD IMMUNIZATION 06/06/2022    ANNUAL REVIEW OF HM ORDERS 07/14/2022    MEDICARE ANNUAL WELLNESS VISIT 01/10/2023    COVID-19 Vaccine 03/06/2023       Communication:  See MyChart message

## 2023-06-26 NOTE — TELEPHONE ENCOUNTER
"Endoscopy Scheduling Screen    Have you had a positive Covid test in the last 14 days?  No    Are you active on MyChart?   Yes    What insurance is in the chart?  Other:  BCBS/MEDICARE    Ordering/Referring Provider: Akhil Almodovar MD   (If ordering provider performs procedure, schedule with ordering provider unless otherwise instructed. )    BMI: Estimated body mass index is 27.4 kg/m  as calculated from the following:    Height as of 6/9/23: 1.549 m (5' 1\").    Weight as of 6/9/23: 65.8 kg (145 lb).     Sedation Ordered  moderate sedation.   If patient BMI > 50 do not schedule in [facility].    Are you taking any prescription medications for pain?   No    Are you taking methadone or Suboxone?  No    Do you have a history of malignant hyperthermia or adverse reaction to anesthesia?  No    (Females) Are you currently pregnant?   No     Have you been diagnosed or told you have pulmonary hypertension?   No    Do you have an LVAD?  No    Have you been told you have moderate to severe sleep apnea?  No    Have you been told you have COPD, asthma, or any other lung disease?  No    Do you have any heart conditions?  No     Have you ever had or are you awaiting a heart or lung transplant?   No    Have you had a stroke or transient ischemic attack (TIA aka \"mini stroke\" in the last 6 months?   No    Have you been diagnosed with or been told you have cirrhosis of the liver?   No    Are you currently on dialysis?   No    Do you need assistance transferring?   No    BMI: Estimated body mass index is 27.4 kg/m  as calculated from the following:    Height as of 6/9/23: 1.549 m (5' 1\").    Weight as of 6/9/23: 65.8 kg (145 lb).     Is patients BMI > 40 and scheduling location [facility]?  No    Do you take the medication Phentermine, Ozempic or Wegovy?  No    Do you take the medication Naltrexone?  No    Do you take blood thinners?  No      Prep   Are you currently on dialysis or do you have chronic kidney disease?  No    Do you have a diagnosis " of diabetes?  No    Do you have a diagnosis of cystic fibrosis (CF)?  No    On a regular basis do you go 3 -5 days between bowel movements?  No    BMI > 40?  No    Preferred Pharmacy:        [id number] - [place] - [address] AT [facility] [address]  [address]  [address]  [place] [address]  Phone: [phone] Fax: [phone]          Final Scheduling Details   Colonoscopy prep sent?  MiraLAX (No Mag Citrate)    Procedure scheduled  Upper endoscopy (EGD)    Surgeon:   KARELY    Date of procedure:  7/5/23     Schedule PAC:   No    Location  [facility]    Sedation   Moderate Sedation    Patient Reminders:    You will receive a call from a Nurse to review instructions and health history.  This assessment must be completed prior to your procedure.  Failure to complete the Nurse assessment may result in the procedure being cancelled.       On the day of your procedure, please designate an adult(s) who can drive you home stay with you for the next 24 hours. The medicines used in the exam will make you sleepy. You will not be able to drive.       You cannot take public transportation, ride share services, or non-medical taxi service without a responsible caregiver.  Medical transport services are allowed with the requirement that a responsible caregiver will receive you at your destination.  We require that drivers and caregivers are confirmed prior to your procedure.

## 2023-07-05 ENCOUNTER — HOSPITAL ENCOUNTER (OUTPATIENT)
Facility: CLINIC | Age: 68
Discharge: HOME | End: 2023-07-05
Attending: SPECIALIST | Admitting: SPECIALIST
Payer: MEDICARE

## 2023-07-05 VITALS
DIASTOLIC BLOOD PRESSURE: 71 MMHG | SYSTOLIC BLOOD PRESSURE: 111 MMHG | RESPIRATION RATE: 17 BRPM | OXYGEN SATURATION: 94 % | HEART RATE: 52 BPM

## 2023-07-05 LAB — UPPER GI ENDOSCOPY: NORMAL

## 2023-07-05 PROCEDURE — 88305 TISSUE EXAM BY PATHOLOGIST: CPT | Mod: TC | Performed by: SPECIALIST

## 2023-07-05 PROCEDURE — G0500 MOD SEDAT ENDO SERVICE >5YRS: HCPCS | Performed by: SPECIALIST

## 2023-07-05 PROCEDURE — 43239 EGD BIOPSY SINGLE/MULTIPLE: CPT | Performed by: SPECIALIST

## 2023-07-05 PROCEDURE — 250N000009 HC RX 250: Performed by: SPECIALIST

## 2023-07-05 PROCEDURE — 250N000011 HC RX IP 250 OP 636: Performed by: SPECIALIST

## 2023-07-05 PROCEDURE — 88305 TISSUE EXAM BY PATHOLOGIST: CPT | Mod: 26 | Performed by: PATHOLOGY

## 2023-07-05 RX ORDER — ONDANSETRON 2 MG/ML
4 INJECTION INTRAMUSCULAR; INTRAVENOUS
Status: DISCONTINUED | OUTPATIENT
Start: 2023-07-05 | End: 2023-07-05 | Stop reason: HOSPADM

## 2023-07-05 RX ORDER — FENTANYL CITRATE 50 UG/ML
INJECTION, SOLUTION INTRAMUSCULAR; INTRAVENOUS PRN
Status: DISCONTINUED | OUTPATIENT
Start: 2023-07-05 | End: 2023-07-05 | Stop reason: HOSPADM

## 2023-07-05 RX ORDER — LIDOCAINE 40 MG/G
CREAM TOPICAL
Status: DISCONTINUED | OUTPATIENT
Start: 2023-07-05 | End: 2023-07-05 | Stop reason: HOSPADM

## 2023-07-05 ASSESSMENT — ACTIVITIES OF DAILY LIVING (ADL): ADLS_ACUITY_SCORE: 35

## 2023-07-05 NOTE — H&P
Pre-Endoscopy History and Physical     Idalia Howe MRN# [id number]   YOB: 1955 Age: 67 year old     Date of Procedure: 7/5/2023  Primary care provider: Akhil Almodovar  Type of Endoscopy: esophagogastroduodenoscopy (upper GI endoscopy)  Reason for Procedure: GERD, increased cough and shortness of breath for 4 months  Type of Anesthesia Anticipated: Moderate sedation    HPI:    Idalia is a 67 year old female who will be undergoing the above procedure.      A history and physical has been performed. The patient's medications and allergies have been reviewed. The risks and benefits of the procedure and the sedation options and risks were discussed with the patient.  All questions were answered and informed consent was obtained.      She denies a personal or family history of anesthesia complications or bleeding disorders.     No Known Allergies     Current Facility-Administered Medications   Medication     lidocaine (LMX4) cream     lidocaine 1 % 0.1-1 mL     ondansetron (ZOFRAN) injection 4 mg     sodium chloride (PF) 0.9% PF flush 3 mL     sodium chloride (PF) 0.9% PF flush 3 mL       Patient Active Problem List   Diagnosis     Hyperlipidemia LDL goal <160     Lumbar spondylosis     Lumbar disc disease with radiculopathy     DJD (degenerative joint disease) of cervical spine     Malignant neoplasm of thyroid gland (H)     Nonspecific reaction to tuberculin test     Cyst of ovary     History of malignant neoplasm of thyroid     Postoperative hypothyroidism     Acute bronchitis     Chronic bronchitis (H)     Chronic rhinitis     Mixed incontinence     Urgency-frequency syndrome     Hypothyroidism, unspecified type     Myalgia of pelvic floor     Pelvic floor dysfunction     Fecal smearing     Personal history of urinary tract infection        Past Medical History:   Diagnosis Date     Back pain      Hip pain      Neuropathic pain         Past Surgical History:   Procedure Laterality Date      "THYROIDECTOMY   2004    thyroid cancer       Social History     Tobacco Use     Smoking status: Never     Smokeless tobacco: Never   Substance Use Topics     Alcohol use: Not Currently     Comment: once-twice a month        History reviewed. No pertinent family history.    REVIEW OF SYSTEMS:   5 point ROS negative except as noted above in HPI, including Gen., Resp., CV, GI &  system review.    PHYSICAL EXAM:   LMP  (LMP Unknown)  Estimated body mass index is 27.4 kg/m  as calculated from the following:    Height as of 6/9/23: 1.549 m (5' 1\").    Weight as of 6/9/23: 65.8 kg (145 lb).   GENERAL APPEARANCE: healthy  MENTAL STATUS: alert  AIRWAY EXAM: Mallampatti Class II (visualization of the soft palate, fauces, and uvula)  RESP: lungs clear to auscultation - no rales, rhonchi or wheezes  CV: regular rates and rhythm, normal S1 S2, no S3 or S4 and no murmur, click or rub    DIAGNOSTICS:    Not indicated    IMPRESSION   ASA Class 2 - Mild systemic disease    PLAN:   Esophagogastroduodenoscopy      The above has been forwarded to the consulting provider.      Signed Electronically by: David Garcia MD  July 5, 2023        "

## 2023-07-06 LAB
PATH REPORT.COMMENTS IMP SPEC: NORMAL
PATH REPORT.COMMENTS IMP SPEC: NORMAL
PATH REPORT.FINAL DX SPEC: NORMAL
PATH REPORT.GROSS SPEC: NORMAL
PATH REPORT.MICROSCOPIC SPEC OTHER STN: NORMAL
PATH REPORT.RELEVANT HX SPEC: NORMAL
PHOTO IMAGE: NORMAL

## 2023-07-19 ENCOUNTER — MYC MEDICAL ADVICE (OUTPATIENT)
Dept: FAMILY MEDICINE | Facility: CLINIC | Age: 68
End: 2023-07-19
Payer: MEDICARE

## 2023-07-19 DIAGNOSIS — K25.3 GASTRIC ULCER DUE TO HELICOBACTER PYLORI, ACUTE: Primary | ICD-10-CM

## 2023-07-19 DIAGNOSIS — B96.81 GASTRIC ULCER DUE TO HELICOBACTER PYLORI, ACUTE: Primary | ICD-10-CM

## 2023-08-17 ENCOUNTER — LAB (OUTPATIENT)
Dept: LAB | Facility: CLINIC | Age: 68
End: 2023-08-17
Payer: MEDICARE

## 2023-08-17 DIAGNOSIS — B96.81 GASTRIC ULCER DUE TO HELICOBACTER PYLORI, ACUTE: ICD-10-CM

## 2023-08-17 DIAGNOSIS — K25.3 GASTRIC ULCER DUE TO HELICOBACTER PYLORI, ACUTE: ICD-10-CM

## 2023-08-18 ENCOUNTER — APPOINTMENT (OUTPATIENT)
Dept: LAB | Facility: CLINIC | Age: 68
End: 2023-08-18
Payer: MEDICARE

## 2023-08-18 PROCEDURE — 87338 HPYLORI STOOL AG IA: CPT

## 2023-08-22 LAB — H PYLORI AG STL QL IA: POSITIVE

## 2023-08-24 ENCOUNTER — VIRTUAL VISIT (OUTPATIENT)
Dept: FAMILY MEDICINE | Facility: CLINIC | Age: 68
End: 2023-08-24
Payer: MEDICARE

## 2023-08-24 DIAGNOSIS — K29.30 SUPERFICIAL GASTRITIS WITHOUT HEMORRHAGE, UNSPECIFIED CHRONICITY: ICD-10-CM

## 2023-08-24 DIAGNOSIS — Z12.31 VISIT FOR SCREENING MAMMOGRAM: ICD-10-CM

## 2023-08-24 DIAGNOSIS — A04.8 H. PYLORI INFECTION: ICD-10-CM

## 2023-08-24 DIAGNOSIS — R05.3 CHRONIC COUGH: ICD-10-CM

## 2023-08-24 DIAGNOSIS — Z12.11 SCREEN FOR COLON CANCER: ICD-10-CM

## 2023-08-24 DIAGNOSIS — J45.40 MODERATE PERSISTENT ASTHMA WITHOUT COMPLICATION: Primary | ICD-10-CM

## 2023-08-24 PROCEDURE — 99214 OFFICE O/P EST MOD 30 MIN: CPT | Mod: VID | Performed by: FAMILY MEDICINE

## 2023-08-24 NOTE — PROGRESS NOTES
Idalia is a 67 year old who is being evaluated via a billable video visit.      How would you like to obtain your AVS? MyChart  If the video visit is dropped, the invitation should be resent by: Text to cell phone: [phone]  Will anyone else be joining your video visit? No        Assessment & Plan     Moderate persistent asthma without complication  stable    Screen for colon cancer  Will discuss about it at CPE    Visit for screening mammogram  Will discuss about it at CPE    Superficial gastritis without hemorrhage, unspecified chronicity  Had H pylori and treated with triple regimen, and retested with stool antigen and it was positive, will have her to retry PPI for next 6 weeks, and recheck sx. If not improving, will have her to try different triple regiment with Flagyl instead of amoxicillin   - omeprazole (PRILOSEC) 20 MG DR capsule; Take 1 capsule (20 mg) by mouth 2 times daily    H. pylori infection  Mentioned above   - omeprazole (PRILOSEC) 20 MG DR capsule; Take 1 capsule (20 mg) by mouth 2 times daily    Chronic cough  Has been worsening with SOB, will have her to see pulmonology   - Adult Pulmonary Medicine  Referral; Future             FUTURE APPOINTMENTS:       - Follow-up visit in 6 weeks     MD RONALD Lorenz [facility]   Idalia is a 67 year old, presenting for the following health issues:  Results        8/24/2023     8:59 AM   Additional Questions   Roomed by Tamera CARDENAS       History of Present Illness       Reason for visit:  To discuss results from lab test    She eats 2-3 servings of fruits and vegetables daily.She consumes 0 sweetened beverage(s) daily.She exercises with enough effort to increase her heart rate 10 to 19 minutes per day.  She exercises with enough effort to increase her heart rate 3 or less days per week.   She is taking medications regularly.       GERD/Heartburn  Onset/Duration: intermittent  Description: burning    Intensity: mild  Progression of Symptoms: waxing and waning  Accompanying Signs & Symptoms:  Does it feel like food gets stuck or trouble swallowing: No  Nausea: No  Vomiting (bloody?): No  Abdominal Pain: YES  Black-Tarry stools: No  Bloody stools: No  History:  Previous similar episodes: No  Previous ulcers: No  Precipitating factors:   Caffeine use: YES  Alcohol use: No  NSAID/Aspirin use: No  Tobacco use: No  Worse with no particular food or drink.  Alleviating factors: None  Therapies tried and outcome:             Lifestyle changes: None            Medications: none        Review of Systems   Constitutional, HEENT, cardiovascular, pulmonary, gi and gu systems are negative, except as otherwise noted.      Objective           Vitals:  No vitals were obtained today due to virtual visit.    Physical Exam   GENERAL: Healthy, alert and no distress  EYES: Eyes grossly normal to inspection.  No discharge or erythema, or obvious scleral/conjunctival abnormalities.  RESP: No audible wheeze, cough, or visible cyanosis.  No visible retractions or increased work of breathing.    SKIN: Visible skin clear. No significant rash, abnormal pigmentation or lesions.  NEURO: Cranial nerves grossly intact.  Mentation and speech appropriate for age.  PSYCH: Mentation appears normal, affect normal/bright, judgement and insight intact, normal speech and appearance well-groomed.                Video-Visit Details    Type of service:  Video Visit   Video Start Time:  9:30  Video End Time:10:03 AM    Originating Location (pt. Location): Home    Distant Location (provider location):  On-site  Platform used for Video Visit: [email or web address]

## 2023-09-06 ENCOUNTER — ANCILLARY PROCEDURE (OUTPATIENT)
Dept: GENERAL RADIOLOGY | Facility: CLINIC | Age: 68
End: 2023-09-06
Attending: FAMILY MEDICINE
Payer: MEDICARE

## 2023-09-06 ENCOUNTER — OFFICE VISIT (OUTPATIENT)
Dept: FAMILY MEDICINE | Facility: CLINIC | Age: 68
End: 2023-09-06
Payer: MEDICARE

## 2023-09-06 VITALS
RESPIRATION RATE: 16 BRPM | SYSTOLIC BLOOD PRESSURE: 120 MMHG | DIASTOLIC BLOOD PRESSURE: 74 MMHG | OXYGEN SATURATION: 99 % | HEART RATE: 83 BPM | BODY MASS INDEX: 28.02 KG/M2 | TEMPERATURE: 97.7 F | HEIGHT: 61 IN | WEIGHT: 148.4 LBS

## 2023-09-06 DIAGNOSIS — R05.3 CHRONIC COUGH: ICD-10-CM

## 2023-09-06 DIAGNOSIS — S69.92XA INJURY OF LEFT WRIST, INITIAL ENCOUNTER: ICD-10-CM

## 2023-09-06 DIAGNOSIS — J42 CHRONIC BRONCHITIS, UNSPECIFIED CHRONIC BRONCHITIS TYPE (H): Primary | ICD-10-CM

## 2023-09-06 DIAGNOSIS — J42 CHRONIC BRONCHITIS, UNSPECIFIED CHRONIC BRONCHITIS TYPE (H): ICD-10-CM

## 2023-09-06 PROCEDURE — 71046 X-RAY EXAM CHEST 2 VIEWS: CPT | Mod: TC | Performed by: RADIOLOGY

## 2023-09-06 PROCEDURE — 99214 OFFICE O/P EST MOD 30 MIN: CPT | Performed by: FAMILY MEDICINE

## 2023-09-06 PROCEDURE — 73110 X-RAY EXAM OF WRIST: CPT | Mod: TC | Performed by: RADIOLOGY

## 2023-09-06 RX ORDER — GABAPENTIN 100 MG/1
100 CAPSULE ORAL 2 TIMES DAILY
Qty: 180 CAPSULE | Refills: 0 | Status: SHIPPED | OUTPATIENT
Start: 2023-09-06 | End: 2023-12-03

## 2023-09-06 RX ORDER — CODEINE PHOSPHATE AND GUAIFENESIN 10; 100 MG/5ML; MG/5ML
1 SOLUTION ORAL EVERY 8 HOURS PRN
Qty: 350 ML | Refills: 0 | Status: SHIPPED | OUTPATIENT
Start: 2023-09-06 | End: 2024-06-03

## 2023-09-06 NOTE — PROGRESS NOTES
"  Assessment & Plan     Chronic bronchitis, unspecified chronic bronchitis type (H)  Has been worsening with SOB due to postnasal drainage,   Xr showed normal results   Pt did not check on with pulmonology yet, encouraged her to schedule ASAP  Will have her to try gabapentin for chronic cough   - XR Chest 2 Views; Future    Injury of left wrist, initial encounter  Had fall with extended left hand, has been having pain, xr showed no fracture   Will have her to continue conservative management   - XR Wrist Left G/E 3 Views; Future    Chronic cough  Mentioned above   - guaiFENesin-codeine (ROBITUSSIN AC) 100-10 MG/5ML solution; Take 5 mLs by mouth every 8 hours as needed for cough  - gabapentin (NEURONTIN) 100 MG capsule; Take 1 capsule (100 mg) by mouth 2 times daily             FUTURE APPOINTMENTS:       - Follow-up visit in 2 months for CPE    Akhil Almodovar MD  [facility] 67 [facility], presenting for the following health issues:  No chief complaint on file.      HPI     Follow up    Review of Systems   Constitutional, HEENT, cardiovascular, pulmonary, GI, , musculoskeletal, neuro, skin, endocrine and psych systems are negative, except as otherwise noted.      Objective    /74 (BP Location: Right arm, Patient Position: Sitting, Cuff Size: Adult Regular)   Pulse 83   Temp 97.7  F (36.5  C)   Resp 16   Ht 1.549 m (5' 1\")   Wt 67.3 kg (148 lb 6.4 oz)   LMP  (LMP Unknown)   SpO2 99%   BMI 28.04 kg/m    Body mass index is 28.04 kg/m .  Physical Exam   GENERAL: healthy, alert and no distress  NECK: no adenopathy, no asymmetry, masses, or scars and thyroid normal to palpation  RESP: lungs clear to auscultation - no rales, rhonchi or wheezes  CV: regular rate and rhythm, normal S1 S2, no S3 or S4, no murmur, click or rub, no peripheral edema and peripheral pulses strong  ABDOMEN: soft, nontender, no hepatosplenomegaly, no masses and bowel sounds " normal  MS: no gross musculoskeletal defects noted, no edema

## 2023-09-18 DIAGNOSIS — E89.0 POSTOPERATIVE HYPOTHYROIDISM: ICD-10-CM

## 2023-09-18 DIAGNOSIS — J45.41 MODERATE PERSISTENT ASTHMA WITH ACUTE EXACERBATION: ICD-10-CM

## 2023-09-18 DIAGNOSIS — J32.9 PURULENT POSTNASAL DRAINAGE: ICD-10-CM

## 2023-09-18 RX ORDER — MONTELUKAST SODIUM 10 MG/1
TABLET ORAL
Qty: 90 TABLET | Refills: 1 | Status: SHIPPED | OUTPATIENT
Start: 2023-09-18 | End: 2024-03-15

## 2023-09-18 RX ORDER — LEVOTHYROXINE SODIUM 100 UG/1
100 TABLET ORAL DAILY
Qty: 90 TABLET | Refills: 1 | Status: SHIPPED | OUTPATIENT
Start: 2023-09-18 | End: 2023-12-07

## 2023-09-26 NOTE — TELEPHONE ENCOUNTER
Patient is scheduled with PCP on 10/23 for Annual Exam. Medicare Visit - Welcome to.     Tran WAGNER [facility] [place]

## 2023-10-03 DIAGNOSIS — A04.8 H. PYLORI INFECTION: ICD-10-CM

## 2023-10-03 DIAGNOSIS — K29.30 SUPERFICIAL GASTRITIS WITHOUT HEMORRHAGE, UNSPECIFIED CHRONICITY: ICD-10-CM

## 2023-11-23 DIAGNOSIS — K29.30 SUPERFICIAL GASTRITIS WITHOUT HEMORRHAGE, UNSPECIFIED CHRONICITY: ICD-10-CM

## 2023-11-23 DIAGNOSIS — A04.8 H. PYLORI INFECTION: ICD-10-CM

## 2023-12-03 DIAGNOSIS — R05.3 CHRONIC COUGH: ICD-10-CM

## 2023-12-03 RX ORDER — GABAPENTIN 100 MG/1
100 CAPSULE ORAL 2 TIMES DAILY
Qty: 180 CAPSULE | Refills: 0 | Status: SHIPPED | OUTPATIENT
Start: 2023-12-03 | End: 2024-02-28

## 2023-12-04 DIAGNOSIS — I10 BENIGN ESSENTIAL HYPERTENSION: ICD-10-CM

## 2023-12-04 RX ORDER — LOSARTAN POTASSIUM 100 MG/1
100 TABLET ORAL DAILY
Qty: 90 TABLET | Refills: 2 | Status: SHIPPED | OUTPATIENT
Start: 2023-12-04 | End: 2024-06-03

## 2023-12-04 RX ORDER — CHLORTHALIDONE 25 MG/1
12.5 TABLET ORAL DAILY
Qty: 45 TABLET | Refills: 2 | Status: SHIPPED | OUTPATIENT
Start: 2023-12-04

## 2023-12-06 ENCOUNTER — OFFICE VISIT (OUTPATIENT)
Dept: FAMILY MEDICINE | Facility: CLINIC | Age: 68
End: 2023-12-06
Payer: MEDICARE

## 2023-12-06 VITALS
RESPIRATION RATE: 16 BRPM | HEIGHT: 62 IN | OXYGEN SATURATION: 95 % | SYSTOLIC BLOOD PRESSURE: 132 MMHG | TEMPERATURE: 97.6 F | DIASTOLIC BLOOD PRESSURE: 83 MMHG | BODY MASS INDEX: 27.6 KG/M2 | HEART RATE: 55 BPM | WEIGHT: 150 LBS

## 2023-12-06 DIAGNOSIS — Z12.31 VISIT FOR SCREENING MAMMOGRAM: ICD-10-CM

## 2023-12-06 DIAGNOSIS — J32.9 PURULENT POSTNASAL DRAINAGE: ICD-10-CM

## 2023-12-06 DIAGNOSIS — R30.0 DYSURIA: ICD-10-CM

## 2023-12-06 DIAGNOSIS — Z12.11 SCREEN FOR COLON CANCER: ICD-10-CM

## 2023-12-06 DIAGNOSIS — Z00.01 ENCOUNTER FOR GENERAL ADULT MEDICAL EXAMINATION WITH ABNORMAL FINDINGS: Primary | ICD-10-CM

## 2023-12-06 DIAGNOSIS — Z00.00 ENCOUNTER FOR MEDICARE ANNUAL WELLNESS EXAM: ICD-10-CM

## 2023-12-06 DIAGNOSIS — R32 URINARY INCONTINENCE, UNSPECIFIED TYPE: ICD-10-CM

## 2023-12-06 DIAGNOSIS — E03.9 HYPOTHYROIDISM, UNSPECIFIED TYPE: ICD-10-CM

## 2023-12-06 DIAGNOSIS — I10 BENIGN ESSENTIAL HYPERTENSION: ICD-10-CM

## 2023-12-06 DIAGNOSIS — J45.40 MODERATE PERSISTENT ASTHMA WITHOUT COMPLICATION: ICD-10-CM

## 2023-12-06 DIAGNOSIS — N30.00 ACUTE CYSTITIS WITHOUT HEMATURIA: ICD-10-CM

## 2023-12-06 LAB
ALBUMIN UR-MCNC: NEGATIVE MG/DL
APPEARANCE UR: CLEAR
BACTERIA #/AREA URNS HPF: ABNORMAL /HPF
BILIRUB UR QL STRIP: NEGATIVE
COLOR UR AUTO: YELLOW
ERYTHROCYTE [DISTWIDTH] IN BLOOD BY AUTOMATED COUNT: 12.6 % (ref 10–15)
GLUCOSE UR STRIP-MCNC: NEGATIVE MG/DL
HCT VFR BLD AUTO: 43.4 % (ref 35–47)
HGB BLD-MCNC: 14.6 G/DL (ref 11.7–15.7)
HGB UR QL STRIP: NEGATIVE
KETONES UR STRIP-MCNC: NEGATIVE MG/DL
LEUKOCYTE ESTERASE UR QL STRIP: ABNORMAL
MCH RBC QN AUTO: 29.7 PG (ref 26.5–33)
MCHC RBC AUTO-ENTMCNC: 33.6 G/DL (ref 31.5–36.5)
MCV RBC AUTO: 88 FL (ref 78–100)
NITRATE UR QL: POSITIVE
PH UR STRIP: 8.5 [PH] (ref 5–7)
PLATELET # BLD AUTO: 343 10E3/UL (ref 150–450)
RBC # BLD AUTO: 4.92 10E6/UL (ref 3.8–5.2)
RBC #/AREA URNS AUTO: ABNORMAL /HPF
RENAL EPI CELLS #/AREA URNS HPF: ABNORMAL /HPF
SP GR UR STRIP: 1.01 (ref 1–1.03)
UROBILINOGEN UR STRIP-ACNC: 0.2 E.U./DL
WBC # BLD AUTO: 7.4 10E3/UL (ref 4–11)
WBC #/AREA URNS AUTO: ABNORMAL /HPF

## 2023-12-06 PROCEDURE — 87186 SC STD MICRODIL/AGAR DIL: CPT | Performed by: FAMILY MEDICINE

## 2023-12-06 PROCEDURE — 36415 COLL VENOUS BLD VENIPUNCTURE: CPT | Performed by: FAMILY MEDICINE

## 2023-12-06 PROCEDURE — 99214 OFFICE O/P EST MOD 30 MIN: CPT | Mod: 25 | Performed by: FAMILY MEDICINE

## 2023-12-06 PROCEDURE — G0008 ADMIN INFLUENZA VIRUS VAC: HCPCS | Performed by: FAMILY MEDICINE

## 2023-12-06 PROCEDURE — 87088 URINE BACTERIA CULTURE: CPT | Performed by: FAMILY MEDICINE

## 2023-12-06 PROCEDURE — 90662 IIV NO PRSV INCREASED AG IM: CPT | Performed by: FAMILY MEDICINE

## 2023-12-06 PROCEDURE — 84439 ASSAY OF FREE THYROXINE: CPT | Performed by: FAMILY MEDICINE

## 2023-12-06 PROCEDURE — G0439 PPPS, SUBSEQ VISIT: HCPCS | Performed by: FAMILY MEDICINE

## 2023-12-06 PROCEDURE — 80061 LIPID PANEL: CPT | Performed by: FAMILY MEDICINE

## 2023-12-06 PROCEDURE — 84443 ASSAY THYROID STIM HORMONE: CPT | Performed by: FAMILY MEDICINE

## 2023-12-06 PROCEDURE — 81001 URINALYSIS AUTO W/SCOPE: CPT | Performed by: FAMILY MEDICINE

## 2023-12-06 PROCEDURE — 85027 COMPLETE CBC AUTOMATED: CPT | Performed by: FAMILY MEDICINE

## 2023-12-06 PROCEDURE — 80053 COMPREHEN METABOLIC PANEL: CPT | Performed by: FAMILY MEDICINE

## 2023-12-06 PROCEDURE — 87086 URINE CULTURE/COLONY COUNT: CPT | Performed by: FAMILY MEDICINE

## 2023-12-06 RX ORDER — CETIRIZINE HYDROCHLORIDE 10 MG/1
10-20 TABLET ORAL DAILY
Qty: 180 TABLET | Refills: 1 | Status: SHIPPED | OUTPATIENT
Start: 2023-12-06 | End: 2024-07-01

## 2023-12-06 RX ORDER — NITROFURANTOIN 25; 75 MG/1; MG/1
100 CAPSULE ORAL 2 TIMES DAILY
Qty: 14 CAPSULE | Refills: 0 | Status: SHIPPED | OUTPATIENT
Start: 2023-12-06 | End: 2024-06-03

## 2023-12-06 RX ORDER — RESPIRATORY SYNCYTIAL VIRUS VACCINE 120MCG/0.5
0.5 KIT INTRAMUSCULAR ONCE
Qty: 1 EACH | Refills: 0 | Status: CANCELLED | OUTPATIENT
Start: 2023-12-06 | End: 2023-12-06

## 2023-12-06 ASSESSMENT — ENCOUNTER SYMPTOMS
DIZZINESS: 0
CHILLS: 0
DYSURIA: 1
HEMATOCHEZIA: 0
DIARRHEA: 0
FREQUENCY: 1
SORE THROAT: 1
NERVOUS/ANXIOUS: 0
HEADACHES: 0
CONSTIPATION: 0
SHORTNESS OF BREATH: 1
WEAKNESS: 0
PARESTHESIAS: 0
HEMATURIA: 0
ARTHRALGIAS: 1
PALPITATIONS: 1
FEVER: 0
COUGH: 1
JOINT SWELLING: 0
MYALGIAS: 1
HEARTBURN: 0
ABDOMINAL PAIN: 0
EYE PAIN: 1
BREAST MASS: 0
NAUSEA: 0

## 2023-12-06 ASSESSMENT — PAIN SCALES - GENERAL: PAINLEVEL: NO PAIN (0)

## 2023-12-06 ASSESSMENT — ACTIVITIES OF DAILY LIVING (ADL): CURRENT_FUNCTION: NO ASSISTANCE NEEDED

## 2023-12-06 NOTE — PATIENT INSTRUCTIONS
Patient Education   Personalized Prevention Plan  You are due for the preventive services outlined below.  Your care team is available to assist you in scheduling these services.  If you have already completed any of these items, please share that information with your care team to update in your medical record.  Health Maintenance Due   Topic Date Due     Breathing Capacity Test  Never done     COPD Action Plan  Never done     RSV VACCINE (Pregnancy & 60+) (1 - 1-dose 60+ series) Never done     Colorectal Cancer Screening  01/01/2015     Mammogram  02/04/2021     Diptheria Tetanus Pertussis (DTAP/TDAP/TD) Vaccine (2 - Td or Tdap) 06/06/2022     Flu Vaccine (1) 09/01/2023     COVID-19 Vaccine (4 - 2023-24 season) 09/01/2023     Bladder Training: Care Instructions  Your Care Instructions     Bladder training is used to treat urge incontinence and stress incontinence. Urge incontinence means that the need to urinate comes on so fast that you can't get to a toilet in time. Stress incontinence means that you leak urine because of pressure on your bladder. For example, it may happen when you laugh, cough, or lift something heavy.  Bladder training can increase how long you can wait before you have to urinate. It can also help your bladder hold more urine. And it can give you better control over the urge to urinate.  It is important to remember that bladder training takes a few weeks to a few months to make a difference. You may not see results right away, but don't give up.  Follow-up care is a key part of your treatment and safety. Be sure to make and go to all appointments, and call your doctor if you are having problems. It's also a good idea to know your test results and keep a list of the medicines you take.  How can you care for yourself at home?  Work with your doctor to come up with a bladder training program that is right for you. You may use one or more of the following methods.  Delayed urination  In the  "beginning, try to keep from urinating for 5 minutes after you first feel the need to go.  While you wait, take deep, slow breaths to relax. Kegel exercises can also help you delay the need to go to the bathroom.  After some practice, when you can easily wait 5 minutes to urinate, try to wait 10 minutes before you urinate.  Slowly increase the waiting period until you are able to control when you have to urinate.  Scheduled urination  Empty your bladder when you first wake up in the morning.  Schedule times throughout the day when you will urinate.  Start by going to the bathroom every hour, even if you don't need to go.  Slowly increase the time between trips to the bathroom.  When you have found a schedule that works well for you, keep doing it.  If you wake up during the night and have to urinate, do it. Apply your schedule to waking hours only.  Kegel exercises  These tighten and strengthen pelvic muscles, which can help you control the flow of urine. (If doing these exercises causes pain, stop doing them and talk with your doctor.) To do Kegel exercises:  Squeeze your muscles as if you were trying not to pass gas. Or squeeze your muscles as if you were stopping the flow of urine. Your belly, legs, and buttocks shouldn't move.  Hold the squeeze for 3 seconds, then relax for 5 to 10 seconds.  Start with 3 seconds, then add 1 second each week until you are able to squeeze for 10 seconds.  Repeat the exercise 10 times a session. Do 3 to 8 sessions a day.  When should you call for help?  Watch closely for changes in your health, and be sure to contact your doctor if:    Your incontinence is getting worse.     You do not get better as expected.   Where can you learn more?  Go to https://www.SeeSpace.net/patiented  Enter V684 in the search box to learn more about \"Bladder Training: Care Instructions.\"  Current as of: February 28, 2023               Content Version: 13.8    6115-3723 [employer].   Care " instructions adapted under license by your healthcare professional. If you have questions about a medical condition or this instruction, always ask your healthcare professional. [employer] disclaims any warranty or liability for your use of this information.         Patient Education   Personalized Prevention Plan  You are due for the preventive services outlined below.  Your care team is available to assist you in scheduling these services.  If you have already completed any of these items, please share that information with your care team to update in your medical record.  Health Maintenance Due   Topic Date Due     Breathing Capacity Test  Never done     COPD Action Plan  Never done     RSV VACCINE (Pregnancy & 60+) (1 - 1-dose 60+ series) Never done     Colorectal Cancer Screening  01/01/2015     Mammogram  02/04/2021     Diptheria Tetanus Pertussis (DTAP/TDAP/TD) Vaccine (2 - Td or Tdap) 06/06/2022     Flu Vaccine (1) 09/01/2023     COVID-19 Vaccine (4 - 2023-24 season) 09/01/2023     Bladder Training: Care Instructions  Your Care Instructions     Bladder training is used to treat urge incontinence and stress incontinence. Urge incontinence means that the need to urinate comes on so fast that you can't get to a toilet in time. Stress incontinence means that you leak urine because of pressure on your bladder. For example, it may happen when you laugh, cough, or lift something heavy.  Bladder training can increase how long you can wait before you have to urinate. It can also help your bladder hold more urine. And it can give you better control over the urge to urinate.  It is important to remember that bladder training takes a few weeks to a few months to make a difference. You may not see results right away, but don't give up.  Follow-up care is a key part of your treatment and safety. Be sure to make and go to all appointments, and call your doctor if you are having problems. It's also a good idea  to know your test results and keep a list of the medicines you take.  How can you care for yourself at home?  Work with your doctor to come up with a bladder training program that is right for you. You may use one or more of the following methods.  Delayed urination  In the beginning, try to keep from urinating for 5 minutes after you first feel the need to go.  While you wait, take deep, slow breaths to relax. Kegel exercises can also help you delay the need to go to the bathroom.  After some practice, when you can easily wait 5 minutes to urinate, try to wait 10 minutes before you urinate.  Slowly increase the waiting period until you are able to control when you have to urinate.  Scheduled urination  Empty your bladder when you first wake up in the morning.  Schedule times throughout the day when you will urinate.  Start by going to the bathroom every hour, even if you don't need to go.  Slowly increase the time between trips to the bathroom.  When you have found a schedule that works well for you, keep doing it.  If you wake up during the night and have to urinate, do it. Apply your schedule to waking hours only.  Kegel exercises  These tighten and strengthen pelvic muscles, which can help you control the flow of urine. (If doing these exercises causes pain, stop doing them and talk with your doctor.) To do Kegel exercises:  Squeeze your muscles as if you were trying not to pass gas. Or squeeze your muscles as if you were stopping the flow of urine. Your belly, legs, and buttocks shouldn't move.  Hold the squeeze for 3 seconds, then relax for 5 to 10 seconds.  Start with 3 seconds, then add 1 second each week until you are able to squeeze for 10 seconds.  Repeat the exercise 10 times a session. Do 3 to 8 sessions a day.  When should you call for help?  Watch closely for changes in your health, and be sure to contact your doctor if:    Your incontinence is getting worse.     You do not get better as expected.  "  Where can you learn more?  Go to https://www.healthOndore.net/patiented  Enter V684 in the search box to learn more about \"Bladder Training: Care Instructions.\"  Current as of: February 28, 2023               Content Version: 13.8    5872-5088 [employer].   Care instructions adapted under license by your healthcare professional. If you have questions about a medical condition or this instruction, always ask your healthcare professional. [employer] disclaims any warranty or liability for your use of this information.         "

## 2023-12-06 NOTE — PROGRESS NOTES
"Information on urinary incontinence and treatment options given to patient.  Answers submitted by the patient for this visit:  Annual Preventive Visit (Submitted on 12/6/2023)  Chief Complaint: Annual Exam:  In general, how would you rate your overall physical health?: good  Frequency of exercise:: 2-3 days/week  Do you usually eat at least 4 servings of fruit and vegetables a day, include whole grains & fiber, and avoid regularly eating high fat or \"junk\" foods? : Yes  Taking medications regularly:: Yes  Medication side effects:: Not applicable  Activities of Daily Living: no assistance needed  Home safety: no safety concerns identified  Hearing Impairment:: no hearing concerns  In the past 6 months, have you been bothered by leaking of urine?: Yes  abdominal pain: No  Blood in stool: No  Blood in urine: No  chest pain: No  chills: No  congestion: No  constipation: No  cough: Yes  diarrhea: No  dizziness: No  ear pain: No  eye pain: Yes  nervous/anxious: No  fever: No  frequency: Yes  genital sores: No  headaches: No  hearing loss: No  heartburn: No  arthralgias: Yes  joint swelling: No  peripheral edema: No  mood changes: No  myalgias: Yes  nausea: No  dysuria: Yes  palpitations: Yes  Skin sensation changes: No  sore throat: Yes  urgency: Yes  rash: No  shortness of breath: Yes  visual disturbance: No  weakness: No  pelvic pain: No  vaginal bleeding: No  vaginal discharge: No  tenderness: No  breast mass: No  breast discharge: No  In general, how would you rate your overall mental or emotional health?: excellent  Additional concerns today:: Yes  Exercise outside of work (Submitted on 12/6/2023)  Chief Complaint: Annual Exam:  Duration of exercise:: 15-30 minutes    "

## 2023-12-07 LAB
ALBUMIN SERPL BCG-MCNC: 4.4 G/DL (ref 3.5–5.2)
ALP SERPL-CCNC: 74 U/L (ref 40–150)
ALT SERPL W P-5'-P-CCNC: 33 U/L (ref 0–50)
ANION GAP SERPL CALCULATED.3IONS-SCNC: 12 MMOL/L (ref 7–15)
AST SERPL W P-5'-P-CCNC: 28 U/L (ref 0–45)
BACTERIA UR CULT: ABNORMAL
BILIRUB SERPL-MCNC: 0.5 MG/DL
BUN SERPL-MCNC: 11.1 MG/DL (ref 8–23)
CALCIUM SERPL-MCNC: 9.5 MG/DL (ref 8.8–10.2)
CHLORIDE SERPL-SCNC: 102 MMOL/L (ref 98–107)
CHOLEST SERPL-MCNC: 201 MG/DL
CREAT SERPL-MCNC: 0.56 MG/DL (ref 0.51–0.95)
DEPRECATED HCO3 PLAS-SCNC: 26 MMOL/L (ref 22–29)
EGFRCR SERPLBLD CKD-EPI 2021: >90 ML/MIN/1.73M2
FASTING STATUS PATIENT QL REPORTED: YES
GLUCOSE SERPL-MCNC: 97 MG/DL (ref 70–99)
HDLC SERPL-MCNC: 72 MG/DL
LDLC SERPL CALC-MCNC: 100 MG/DL
NONHDLC SERPL-MCNC: 129 MG/DL
POTASSIUM SERPL-SCNC: 3.7 MMOL/L (ref 3.4–5.3)
PROT SERPL-MCNC: 7.8 G/DL (ref 6.4–8.3)
SODIUM SERPL-SCNC: 140 MMOL/L (ref 135–145)
T4 FREE SERPL-MCNC: 2.09 NG/DL (ref 0.9–1.7)
TRIGL SERPL-MCNC: 147 MG/DL
TSH SERPL DL<=0.005 MIU/L-ACNC: 0.02 UIU/ML (ref 0.3–4.2)

## 2023-12-07 RX ORDER — LEVOTHYROXINE SODIUM 88 UG/1
88 TABLET ORAL DAILY
Qty: 90 TABLET | Refills: 1 | Status: SHIPPED | OUTPATIENT
Start: 2023-12-07 | End: 2024-06-03

## 2023-12-29 ENCOUNTER — TELEPHONE (OUTPATIENT)
Dept: GASTROENTEROLOGY | Facility: CLINIC | Age: 68
End: 2023-12-29
Payer: MEDICARE

## 2023-12-29 NOTE — TELEPHONE ENCOUNTER
"Endoscopy Scheduling Screen    Have you had a positive Covid test in the last 14 days?  No    Are you active on MyChart?   Yes    What insurance is in the chart?  Other:  MEDICARE    Ordering/Referring Provider: Akhil Almodovar MD in [facility] FP/IM/PEDS    (If ordering provider performs procedure, schedule with ordering provider unless otherwise instructed. )    BMI: Estimated body mass index is 27.74 kg/m  as calculated from the following:    Height as of 12/6/23: 1.566 m (5' 1.65\").    Weight as of 12/6/23: 68 kg (150 lb).     Sedation Ordered  moderate sedation.   If patient BMI > 50 do not schedule in [facility].    If patient BMI > 45 do not schedule at [facility].    Are you taking methadone or Suboxone?  No    Are you taking any prescription medications for pain 3 or more times per week?   NO - No RN review required.    Do you have a history of malignant hyperthermia or adverse reaction to anesthesia?  No    (Females) Are you currently pregnant?   No     Have you been diagnosed or told you have pulmonary hypertension?   No    Do you have an LVAD?  No    Have you been told you have moderate to severe sleep apnea?  No    Have you been told you have COPD, asthma, or any other lung disease?  No    Do you have any heart conditions?  No     Have you ever had an organ transplant?   No    Have you ever had or are you awaiting a heart or lung transplant?   No    Have you had a stroke or transient ischemic attack (TIA aka \"mini stroke\" in the last 6 months?   No    Have you been diagnosed with or been told you have cirrhosis of the liver?   No    Are you currently on dialysis?   No    Do you need assistance transferring?   No    BMI: Estimated body mass index is 27.74 kg/m  as calculated from the following:    Height as of 12/6/23: 1.566 m (5' 1.65\").    Weight as of 12/6/23: 68 kg (150 lb).     Is patients BMI > 40 and scheduling location [facility]?  No    Do you take an injectable medication for weight loss or diabetes (excluding " insulin)?  No    Do you take the medication Naltrexone?  No    Do you take blood thinners?  No       Prep   Are you currently on dialysis or do you have chronic kidney disease?  No    Do you have a diagnosis of diabetes?  No    Do you have a diagnosis of cystic fibrosis (CF)?  No    On a regular basis do you go 3 -5 days between bowel movements?  No    BMI > 40?  No    Preferred Pharmacy:    [id number] - [place], [place] - [address] [place] [address] AT [facility] [address] [facility]  [address]  [place] [address]  Phone: [phone] Fax: [phone]        Final Scheduling Details   Colonoscopy prep sent?  Standard MiraLAX    Procedure scheduled  Colonoscopy    Surgeon:  Ab     Date of procedure:  01/11/2024     Pre-OP / PAC:   No - Not required for this site.    Location  [facility] - Patient preference.    Sedation   Moderate Sedation - Per order.      Patient Reminders:   You will receive a call from a Nurse to review instructions and health history.  This assessment must be completed prior to your procedure.  Failure to complete the Nurse assessment may result in the procedure being cancelled.      On the day of your procedure, please designate an adult(s) who can drive you home stay with you for the next 24 hours. The medicines used in the exam will make you sleepy. You will not be able to drive.      You cannot take public transportation, ride share services, or non-medical taxi service without a responsible caregiver.  Medical transport services are allowed with the requirement that a responsible caregiver will receive you at your destination.  We require that drivers and caregivers are confirmed prior to your procedure.

## 2023-12-29 NOTE — TELEPHONE ENCOUNTER
Pre assessment completed for upcoming procedure.   (Please see previous telephone encounter notes for complete details)    Patient  returned call. Writer connected to [employer] .    Procedure details:    Arrival time and facility location reviewed.    Pre op exam needed? N/A    Designated  policy reviewed. Instructed to have someone stay 6 hours post procedure.     COVID policy reviewed.      Medication review:    Medications reviewed. Please see supporting documentation below. Holding recommendations discussed (if applicable).       Prep for procedure:     Procedure prep instructions reviewed.        Additional information needed?  N/A      Patient  verbalized understanding and had no questions or concerns at this time.      Ina Song RN  Endoscopy Procedure Pre Assessment RN  [phone] option 4

## 2023-12-29 NOTE — TELEPHONE ENCOUNTER
Attempted to contact patient in order to complete pre assessment questions.     [address]  [place] [id number]    No answer. Left message to return call to [phone] option 4      Procedure details:    Patient scheduled for Colonoscopy  on 1/11/24.     Arrival time: 1015. Procedure time 1100    Pre op exam needed? N/A    Facility location: [facility]; [address] [place] [address] [place] [address]    Sedation type: Conscious sedation     Indication for procedure: Screening      Chart review:     Electronic implanted devices? No    Recent diagnosis of diverticulitis within the last 6 weeks? No    Diabetic? No    Diabetic medication HOLDING recommendations: (if applicable)  Oral diabetic medications: No  Diabetic injectables: No  Insulin: No      Medication review:    Anticoagulants? No    NSAIDS? No    Other medication HOLDING recommendations:  N/A      Prep for procedure:     Bowel prep recommendation: Standard Miralax  Due to: standard bowel prep.    Prep instructions sent via [employer].     Lynette Rand RN  Endoscopy Procedure Pre Assessment RN

## 2024-01-10 RX ORDER — ONDANSETRON 2 MG/ML
4 INJECTION INTRAMUSCULAR; INTRAVENOUS
Status: CANCELLED | OUTPATIENT
Start: 2024-01-10

## 2024-01-10 RX ORDER — LIDOCAINE 40 MG/G
CREAM TOPICAL
Status: CANCELLED | OUTPATIENT
Start: 2024-01-10

## 2024-01-11 ENCOUNTER — HOSPITAL ENCOUNTER (OUTPATIENT)
Facility: CLINIC | Age: 69
Discharge: HOME | End: 2024-01-11
Attending: COLON & RECTAL SURGERY | Admitting: COLON & RECTAL SURGERY
Payer: MEDICARE

## 2024-01-11 VITALS
HEIGHT: 62 IN | OXYGEN SATURATION: 97 % | WEIGHT: 150 LBS | BODY MASS INDEX: 27.6 KG/M2 | SYSTOLIC BLOOD PRESSURE: 125 MMHG | DIASTOLIC BLOOD PRESSURE: 75 MMHG | RESPIRATION RATE: 21 BRPM | HEART RATE: 67 BPM

## 2024-01-11 LAB — COLONOSCOPY: NORMAL

## 2024-01-11 PROCEDURE — 45385 COLONOSCOPY W/LESION REMOVAL: CPT | Mod: PT | Performed by: COLON & RECTAL SURGERY

## 2024-01-11 PROCEDURE — 88305 TISSUE EXAM BY PATHOLOGIST: CPT | Mod: TC | Performed by: COLON & RECTAL SURGERY

## 2024-01-11 PROCEDURE — 88305 TISSUE EXAM BY PATHOLOGIST: CPT | Mod: 26 | Performed by: PATHOLOGY

## 2024-01-11 PROCEDURE — G0500 MOD SEDAT ENDO SERVICE >5YRS: HCPCS | Performed by: COLON & RECTAL SURGERY

## 2024-01-11 PROCEDURE — 250N000011 HC RX IP 250 OP 636: Performed by: COLON & RECTAL SURGERY

## 2024-01-11 RX ORDER — FENTANYL CITRATE 50 UG/ML
INJECTION, SOLUTION INTRAMUSCULAR; INTRAVENOUS PRN
Status: DISCONTINUED | OUTPATIENT
Start: 2024-01-11 | End: 2024-01-11 | Stop reason: HOSPADM

## 2024-01-11 ASSESSMENT — ACTIVITIES OF DAILY LIVING (ADL): ADLS_ACUITY_SCORE: 35

## 2024-01-11 NOTE — H&P
Colon & Rectal Surgery History and Physical  Pre-Endoscopy Procedure Note    History of Present Illness   I have been asked by Dr. Almodovar to evaluate this 68 year old female for colorectal polyp surveillance. She currently denies any abdominal pain, weight loss, bleeding per rectum, or recent change in bowel habits.    Past Medical History  Diagnosis Date    Back pain     Cancer      THYROID CANCER    COPD (chronic obstructive pulmonary disease)     Hip pain     Hypertension     Neuropathic pain     Uncomplicated asthma        Past Surgical History  Procedure Laterality Date    ESOPHAGOSCOPY, GASTROSCOPY, DUODENOSCOPY (EGD), COMBINED N/A 07/05/2023    Procedure: Esophagoscopy, gastroscopy, duodenoscopy (EGD), combined;  Surgeon: David Garcia MD;  Location: [facility]    Left salpingectomy Left 2007    left fallopian tube d/t abnormal US findings    THYROIDECTOMY   2004    thyroid cancer        Medications  Medications Prior to Admission   Medication Sig    Acetaminophen (TYLENOL PO) Take 325 mg by mouth every 4 hours as needed for mild pain or fever    albuterol (PROAIR RESPICLICK) 108 (90 Base) MCG/ACT inhaler INHALE 1 PUFF INTO THE LUNGS EVERY 6 HOURS AS NEEDED FOR SHORTNESS OF BREATH OR DIFFICULT BREATHING OR WHEEZING Strength: 108 (90 Base) MCG/ACT    azelastine (ASTELIN) 0.1 % nasal spray Spray 1 spray into both nostrils 2 times daily    Calcium Citrate-Vitamin D (CALCIUM + D PO) Take by mouth daily    cetirizine (ZYRTEC) 10 MG tablet Take 1-2 tablets (10-20 mg) by mouth daily    gabapentin (NEURONTIN) 100 MG capsule TAKE 1 CAPSULE(100 MG) BY MOUTH TWICE DAILY    guaiFENesin-codeine (ROBITUSSIN AC) 100-10 MG/5ML solution Take 5 mLs by mouth every 8 hours as needed for cough    levothyroxine (SYNTHROID/LEVOTHROID) 88 MCG tablet Take 1 tablet (88 mcg) by mouth daily    losartan (COZAAR) 100 MG tablet Take 1 tablet (100 mg) by mouth daily    montelukast (SINGULAIR) 10 MG tablet TAKE 1 TABLET(10 MG) BY MOUTH AT BEDTIME     Multiple Vitamins-Minerals (PRESERVISION AREDS PO) Take by mouth daily    nitroFURantoin macrocrystal-monohydrate (MACROBID) 100 MG capsule Take 1 capsule (100 mg) by mouth 2 times daily    simvastatin (ZOCOR) 40 MG tablet TAKE 1 TABLET(40 MG) BY MOUTH AT BEDTIME    chlorthalidone (HYGROTON) 25 MG tablet Take 0.5 tablets (12.5 mg) by mouth daily (Patient not taking: Reported on 12/6/2023)    conjugated estrogens (PREMARIN) 0.625 MG/GM vaginal cream Place 0.5 g vaginally once a week    fluticasone (FLONASE) 50 MCG/ACT nasal spray Spray 1 spray into both nostrils At Bedtime       Allergies   No Known Allergies     Family History   Family history includes Colon Cancer in her paternal grandmother; Diabetes in her mother; Hyperlipidemia in her mother; Hypertension in her mother.     Social History   She reports that she has never smoked. She has never used smokeless tobacco. She reports current alcohol use. She reports that she does not use drugs.    Review of Systems   Constitutional:  No fever, weight change or fatigue.    Eyes:     No dry eyes or vision changes.   Ears/Nose/Throat/Neck:  No oral ulcers, sore throat or voice change.    Cardiovascular:   No palpitations, syncope, angina or edema.   Respiratory:    No chest pain, excessive sleepiness, shortness of breath or hemoptysis.    Gastrointestinal:   No abdominal pain, nausea, vomiting, diarrhea or heartburn.    Genitourinary:   No dysuria, hematuria, urinary retention or urinary frequency.   Musculoskeletal:  No joint swelling or arthralgias.    Dermatologic:  No skin rash or other skin changes.   Neurologic:    No focal weakness or numbness. No neuropathy.   Psychiatric:    No depression, anxiety, suicidal ideation, or paranoid ideation.   Endocrine:   No cold or heat intolerance, polydipsia, hirsutism, change in libido, or flushing.   Hematology/Lymphatic:  No bleeding or lymphadenopathy.    Allergy/Immunology:  No rhinitis or hives.     Physical  "Exam   Vitals:  Blood pressure 115/77, pulse 72, resp. rate 16, height 1.575 m (5' 2\"), weight 68 kg (150 lb), SpO2 97%, not currently breastfeeding.    General:  Alert and oriented to person, place and time   Airway: Normal oropharyngeal airway and neck mobility   Lungs:  Clear bilaterally   Heart:  Regular rate and rhythm   Abdomen: Soft, NT, ND, no masses   Extremities: Warm, good capillary refill    ASA Grade: II (mild systemic disease)    Impression: Cleared for use of conscious sedation for colorectal polyp surveillance    Plan: Proceed with colonoscopy     Rachel Berger MD  [facility]  [phone]  "

## 2024-02-28 DIAGNOSIS — R05.3 CHRONIC COUGH: ICD-10-CM

## 2024-02-28 RX ORDER — GABAPENTIN 100 MG/1
100 CAPSULE ORAL 2 TIMES DAILY
Qty: 180 CAPSULE | Refills: 0 | Status: SHIPPED | OUTPATIENT
Start: 2024-02-28 | End: 2024-06-03

## 2024-03-11 DIAGNOSIS — E78.5 HYPERLIPIDEMIA LDL GOAL <160: ICD-10-CM

## 2024-03-12 RX ORDER — SIMVASTATIN 40 MG
TABLET ORAL
Qty: 90 TABLET | Refills: 1 | Status: SHIPPED | OUTPATIENT
Start: 2024-03-12 | End: 2024-07-01

## 2024-03-13 ENCOUNTER — TRANSFERRED RECORDS (OUTPATIENT)
Dept: HEALTH INFORMATION MANAGEMENT | Facility: CLINIC | Age: 69
End: 2024-03-13

## 2024-03-15 DIAGNOSIS — J45.41 MODERATE PERSISTENT ASTHMA WITH ACUTE EXACERBATION: ICD-10-CM

## 2024-03-15 DIAGNOSIS — J32.9 PURULENT POSTNASAL DRAINAGE: ICD-10-CM

## 2024-03-15 RX ORDER — MONTELUKAST SODIUM 10 MG/1
TABLET ORAL
Qty: 90 TABLET | Refills: 1 | Status: SHIPPED | OUTPATIENT
Start: 2024-03-15 | End: 2024-09-09

## 2024-03-19 ENCOUNTER — OFFICE VISIT (OUTPATIENT)
Dept: UROLOGY | Facility: CLINIC | Age: 69
End: 2024-03-19
Attending: FAMILY MEDICINE
Payer: MEDICARE

## 2024-03-19 VITALS — SYSTOLIC BLOOD PRESSURE: 182 MMHG | DIASTOLIC BLOOD PRESSURE: 95 MMHG | HEART RATE: 56 BPM | OXYGEN SATURATION: 96 %

## 2024-03-19 DIAGNOSIS — Z87.440 HISTORY OF UTI: ICD-10-CM

## 2024-03-19 DIAGNOSIS — N95.2 ATROPHIC VAGINITIS: ICD-10-CM

## 2024-03-19 DIAGNOSIS — R39.15 URINARY URGENCY: ICD-10-CM

## 2024-03-19 DIAGNOSIS — R15.1 FECAL SMEARING: ICD-10-CM

## 2024-03-19 DIAGNOSIS — N95.2 VAGINAL ATROPHY: ICD-10-CM

## 2024-03-19 DIAGNOSIS — R15.2 RECTAL URGENCY: ICD-10-CM

## 2024-03-19 DIAGNOSIS — R35.0 URINARY FREQUENCY: ICD-10-CM

## 2024-03-19 DIAGNOSIS — Z12.4 PAP SMEAR FOR CERVICAL CANCER SCREENING: ICD-10-CM

## 2024-03-19 DIAGNOSIS — N39.46 MIXED INCONTINENCE: Primary | ICD-10-CM

## 2024-03-19 DIAGNOSIS — M62.89 PELVIC FLOOR DYSFUNCTION: ICD-10-CM

## 2024-03-19 LAB
ALBUMIN UR-MCNC: NEGATIVE MG/DL
APPEARANCE UR: CLEAR
BILIRUB UR QL STRIP: NEGATIVE
COLOR UR AUTO: YELLOW
GLUCOSE UR STRIP-MCNC: NEGATIVE MG/DL
HGB UR QL STRIP: NEGATIVE
KETONES UR STRIP-MCNC: NEGATIVE MG/DL
LEUKOCYTE ESTERASE UR QL STRIP: ABNORMAL
NITRATE UR QL: NEGATIVE
PH UR STRIP: 7.5 [PH] (ref 5–7)
RESIDUAL VOLUME (RV) (EXTERNAL): 26
SP GR UR STRIP: 1.01 (ref 1–1.03)
UROBILINOGEN UR STRIP-ACNC: 0.2 E.U./DL

## 2024-03-19 PROCEDURE — 99204 OFFICE O/P NEW MOD 45 MIN: CPT | Mod: 25 | Performed by: PHYSICIAN ASSISTANT

## 2024-03-19 PROCEDURE — 81003 URINALYSIS AUTO W/O SCOPE: CPT | Mod: QW | Performed by: PHYSICIAN ASSISTANT

## 2024-03-19 PROCEDURE — 51798 US URINE CAPACITY MEASURE: CPT | Performed by: PHYSICIAN ASSISTANT

## 2024-03-19 RX ORDER — TROSPIUM CHLORIDE 20 MG/1
20 TABLET, FILM COATED ORAL
Qty: 180 TABLET | Refills: 1 | Status: SHIPPED | OUTPATIENT
Start: 2024-03-19

## 2024-03-19 RX ORDER — TROSPIUM CHLORIDE 20 MG/1
20 TABLET, FILM COATED ORAL
Qty: 60 TABLET | Refills: 3 | Status: SHIPPED | OUTPATIENT
Start: 2024-03-19 | End: 2024-03-19

## 2024-03-19 NOTE — PATIENT INSTRUCTIONS
- Start estrogen cream 2/week at night. Apply blueberry sized amount on finger and rub along vaginal tissue like a face cream. Call clinic if medication is too expensive.     _______________________________________________     You have been prescribed Trospium 20mg twice daily.    ______________________    Recommend daily fiber supplement like citrucel or metamucil over the counter.     _________________________________    Below is a list of things that can irritate the bladder and should be eliminated:  Caffeinated soft drinks.  Coffee.  Tea.  Chocolate.  Tomato-based foods.  Acidic juices and fruits. (includes cranberry juice)  Alcohol.  Nicotine  Carbonated drinks.  Aspartame/Nutrasweet.    ________________________________     UTI Prevention:    - Recommend cranberry supplement 1gm twice-daily or Vitamin C 1 gm twice daily.   - AZO as needed; if symptoms do not improve after 24-48 hours after taking AZO as needed advise contacting clinic.   - Probiotic daily; recommend for Florajen 3 or any women's probiotic will do.  - Make sure you stay hydrated with about 60-80oz of water per day.   - Recommend good vulvar hygiene such as wearing loose cotton underwear and avoiding scented hygenic products/wipes/soaps or detergents. Wipe front to back after voiding/defecation. Avoid sitting in soiled clothing and keeping vulva dry.   - If you develop symptoms of UTI, please contact clinic. However, if you develop fevers  greater than 100.4 degrees fahrenheit, flank pain or blood in your urine, recommend going to urgent care or ER.   - Make sure to drink plenty of water each day and to really push fluids when have symptoms of a UTI.  - Estrogen cream 2x per week at night; apply blueberry-sized amount on finger and rub along vaginal tissue.   __________________________________________    You have been referred to Pelvic Floor Physical Therapy. They will call you to schedule this appointment  Locations for Pelvic Floor Physical  Therapy:  [facility] [place]   [facility] - [place]   [facility] [place]   [facility] - [place]   [facility] - [place]   [facility] - [place]   [facility] [place] [facility] - [place]   [facility] - [place]   [facility] Rehabilitation services - [facility] [place]   [facility]:   [facility]   [facility] Pediatric Therapy - [facility]   [facility] Rehabilitation services - [facility]   [facility])   [facility]   [facility]    ____________________________________________    Follow-up in 3 months

## 2024-03-19 NOTE — NURSING NOTE
Pt leaks day and nt  Pt has stress leakage  Pt denies dysuria  Pt had left fallopian tube removed.    PVR by scanner= 26mL    KRISTY Anaya CMA

## 2024-03-19 NOTE — LETTER
3/19/2024       RE: Idalia Howe  [address] [place] [address]     Dear Colleague,    Thank you for referring your patient, Idalia Howe, to the [facility] at [facility]. Please see a copy of my visit note below.    Urology Clinic    Name: Idalia Howe    MRN: [id number]   YOB: 1955  Accompanied at today's visit by:video Yakut               Assessment and Plan:   68 year old female with ALLAN, urinary urgency/frequency, myalgia of pelvic floor, pelvic floor dysfunction, fecal smearing, rectal urgency, hx of UTI.     - PVR WNL.   - UA shows WBC but otherwise negative. Denies s/s of UTI today.   - educated patient about UTI prevention and OTC supplements.   - Resume estrogen cream 2x/week; renewed today.  - contact clinic if develops s/s of UTI in the future.   - Start trospium 20mg BID. Discussed risks/benefits and potential side effects of medication. Does have dry mouth; consider myrberiq if fails trospium.   - Avoid bladder irritants  - Referral to PFPT.  - follow-up in 3 months.  - Had large amount of VIDA on exam today. If continues to have VIDA despite PFPT, consider pessary with knob vs anti-incontinence procedure.   - recommend daily fiber supplement for bowels.   - patient requesting referral to OB/GYN for pap smear; referral placed.     Orders Placed This Encounter   Procedures    MEASURE POST-VOID RESIDUAL URINE/BLADDER CAPACITY, US NON-IMAGING (31278)    UA without Microscopic [[id number]]     After discussing the assessment and plan with patient, patient verbalized understanding and agreed to the above plan. All questions answered.     55 minutes were spent today on the date of the encounter in reviewing the EMR, direct patient care, coordination of care and documentation in addition to exam, ordering medications, referral to PFPT, reviewing Dr. Lunsford's note, review labs.     Obdulia Barragan  OSVALDO  March 19, 2024    Patient Care Team:  Akhil Almodovar MD as PCP - General (Family Practice)  Akhil Almodovar MD as Assigned PCP  Obdulia Barragan PA-C as Physician Assistant  AKHIL ALMODOVAR          Chief Complaint:   ALLAN          History of Present Illness:   March 19, 2024    HISTORY: Idalia Howe is a 68 year old female as a new consultation for concerns of ALLAN; VIDA most bothersome. Going every 30minutes at times. Reports getting up at night with leakage secondary to coughing. Hx of chronic cough. Follows with pulmonology. Wears 1 large thick pad per day. Drinks 1 cup of coffee and 2 cups of green tea per day. Denies gross hematuria or history of kidney stones. Reports having some UTIs this past year secondary to not changing her pad frequently enough. Per EMR, has had 2 UTIs over the past 12 months. Denies prolapse symptoms. Not sexually active.. Reports bowels are irregular and has fecal smearing along with rectal urgency. Family hx of colon cancer. Recent colonoscopy on 1/11/24. PMH is significant for lumbar disc disease, hx of thyroid CA s/p surgery. PSH includes left salpingectomy, thyroidectomy. Patient voices no other concerns at this time.            Past Medical History:     Past Medical History:   Diagnosis Date    Back pain     Cancer (H)     HAD THYROID CANCER    COPD (chronic obstructive pulmonary disease) (H)     Hip pain     Hypertension     Neuropathic pain     Thyroid disease     TAKING MEDICATION    Uncomplicated asthma             Past Surgical History:     Past Surgical History:   Procedure Laterality Date    COLONOSCOPY N/A 1/11/2024    Procedure: Colonoscopy;  Surgeon: Rachel Berger MD;  Location:  GI    ESOPHAGOSCOPY, GASTROSCOPY, DUODENOSCOPY (EGD), COMBINED N/A 07/05/2023    Procedure: Esophagoscopy, gastroscopy, duodenoscopy (EGD), combined;  Surgeon: David Garcia MD;  Location:  GI    left salpinectomy Left 2007    left fallopian tube d/t abnormal US findings     THYROIDECTOMY   2004    thyroid cancer            Social History:     Social History     Tobacco Use    Smoking status: Never    Smokeless tobacco: Never   Substance Use Topics    Alcohol use: Yes     Comment: once-twice a month             Family History:     Family History   Problem Relation Age of Onset    Diabetes Mother     Hypertension Mother     Hyperlipidemia Mother     Colon Cancer Paternal Grandmother               Allergies:   No Known Allergies         Medications:     Current Outpatient Medications   Medication Sig    Acetaminophen (TYLENOL PO) Take 325 mg by mouth every 4 hours as needed for mild pain or fever    albuterol (PROAIR RESPICLICK) 108 (90 Base) MCG/ACT inhaler INHALE 1 PUFF INTO THE LUNGS EVERY 6 HOURS AS NEEDED FOR SHORTNESS OF BREATH OR DIFFICULT BREATHING OR WHEEZING Strength: 108 (90 Base) MCG/ACT    azelastine (ASTELIN) 0.1 % nasal spray Spray 1 spray into both nostrils 2 times daily    Calcium Citrate-Vitamin D (CALCIUM + D PO) Take by mouth daily    cetirizine (ZYRTEC) 10 MG tablet Take 1-2 tablets (10-20 mg) by mouth daily    chlorthalidone (HYGROTON) 25 MG tablet Take 0.5 tablets (12.5 mg) by mouth daily (Patient not taking: Reported on 12/6/2023)    conjugated estrogens (PREMARIN) 0.625 MG/GM vaginal cream Place 0.5 g vaginally once a week    fluticasone (FLONASE) 50 MCG/ACT nasal spray Spray 1 spray into both nostrils At Bedtime    gabapentin (NEURONTIN) 100 MG capsule TAKE 1 CAPSULE(100 MG) BY MOUTH TWICE DAILY    guaiFENesin-codeine (ROBITUSSIN AC) 100-10 MG/5ML solution Take 5 mLs by mouth every 8 hours as needed for cough    levothyroxine (SYNTHROID/LEVOTHROID) 88 MCG tablet Take 1 tablet (88 mcg) by mouth daily    losartan (COZAAR) 100 MG tablet Take 1 tablet (100 mg) by mouth daily    montelukast (SINGULAIR) 10 MG tablet TAKE 1 TABLET(10 MG) BY MOUTH AT BEDTIME    Multiple Vitamins-Minerals (PRESERVISION AREDS PO) Take by mouth daily    nitroFURantoin  macrocrystal-monohydrate (MACROBID) 100 MG capsule Take 1 capsule (100 mg) by mouth 2 times daily    simvastatin (ZOCOR) 40 MG tablet TAKE 1 TABLET(40 MG) BY MOUTH AT BEDTIME     No current facility-administered medications for this visit.             Review of Systems:    ROS: 14 point ROS neg other than the symptoms noted above in the HPI.          Physical Exam:   not currently breastfeeding.  Data Unavailable, There is no height or weight on file to calculate BMI., 0 lbs 0 oz  Gen appearance: Age-appropriate appearing female in NAD.   HEENT:  EOMI, conjunctiva clear/white. Normal ROM of neck for age.   Psych:  alert , In no acute distress.  Neuro:  A&Ox3  Skin:  Clear of obvious rashes, ecchymoses.  Resp:  Normal respiratory effort; not in acute respiratory distress.   Vasc:  Regular rate.  lymph:  No obvious LE edema bilaterally.     exam:  Vulva is normal in appearance. Urethra normal.. Positive for large amount of VIDA with reduction of speculum when instructed to cough. Vaginal mucosa atrophic. Myofascial tenderness on internal exam. No prolapse appreciated. Strength 1/5. No obvious masses, lesions, ulcers, bleeding noted on internal or external exam.          Data:    PVR  26mL    Labs:  UA RESULTS:  Recent Labs   Lab Test 03/19/24  0844 12/06/23  1418   COLOR Yellow Yellow   APPEARANCE Clear Clear   URINEGLC Negative Negative   URINEBILI Negative Negative   URINEKETONE Negative Negative   SG 1.015 1.015   UBLD Negative Negative   URINEPH 7.5* 8.5*   PROTEIN Negative Negative   UROBILINOGEN 0.2 0.2   NITRITE Negative Positive*   LEUKEST Trace* Small*   RBCU  --  0-2   WBCU  --  0-5       UC   6/9/23  >100,000 E. Coli (resistant to cipro and levo)  12/6/23 >100,000 Citrobacter koseri (resistant to ampicillin)     Creatinine   Date Value Ref Range Status   12/06/2023 0.56 0.51 - 0.95 mg/dL Final   02/03/2020 0.64 0.52 - 1.04 mg/dL Final     Admission on 01/11/2024, Discharged on 01/11/2024   Component Date  Value Ref Range Status    Case Report 01/11/2024    Final                    Value:Surgical Pathology Report                         Case: [id number]                                  Authorizing Provider:  Rachel Reddy MD   Collected:           01/11/2024 11:06 AM          Ordering Location:     [facility]          Received:            01/11/2024 11:33 AM                                 [facility]                                                          Pathologist:           Nubia Church MD PhD                                                      Specimen:    Large Intestine, Colon, Sigmoid                                                            Final Diagnosis 01/11/2024    Final                    Value:This result contains rich text formatting which cannot be displayed here.    Clinical Information 01/11/2024    Final                    Value:This result contains rich text formatting which cannot be displayed here.    Gross Description 01/11/2024    Final                    Value:This result contains rich text formatting which cannot be displayed here.    Microscopic Description 01/11/2024    Final                    Value:This result contains rich text formatting which cannot be displayed here.    Performing Labs 01/11/2024    Final                    Value:This result contains rich text formatting which cannot be displayed here.    COLONOSCOPY 01/11/2024    Final                    Value:[facility]  [address] [place]  [address]  _______________________________________________________________________________  Patient Name: Idalia Howe            Procedure Date: 1/11/2024 10:48 AM  MRN: [id number]                       Account Number: [id number]  YOB: 1955             Admit Type: Outpatient  Age: 68                               Room: [place] [address]  Note Status: Finalized                Attending MD: RACHEL REDDY MD,   Instrument Name: 512  PCF-EW107M Peds Colon   _______________________________________________________________________________     Procedure:                Colonoscopy  Indications:              High risk colon cancer surveillance: Personal                             history of colonic polyps  Providers:                ALEJANDRO REDDY MD, Corina Durand RN  Referring MD:               Medicines:                Midazolam 2 mg IV, Fentanyl 100 micrograms IV  Complications:            No immediate complications. Es                          timated blood loss:                             Minimal.  _______________________________________________________________________________  Procedure:                Pre-Anesthesia Assessment:                            - Prior to the procedure, a History and Physical                             was performed, and patient medications and                             allergies were reviewed. The risks and benefits of                             the procedure and the sedation options and risks                             were discussed with the patient. All questions were                             answered and informed consent was obtained. Patient                             identification and proposed procedure were verified                             by the physician in the procedure room. Mental                             Status Examination: alert and oriented. Airway                             Examination: normal oropharyngeal airway and neck                             mobility.                           Respiratory Examination: clear to                             auscultation. CV Examination: normal. ASA Grade                             Assessment: II - A patient with mild systemic                             disease. After reviewing the risks and benefits,                             the patient was deemed in satisfactory condition to                             undergo the procedure. The  anesthesia plan was to                             use moderate sedation / analgesia (conscious                             sedation). Immediately prior to administration of                             medications, the patient was re-assessed for                             adequacy to receive sedatives. The heart rate,                             respiratory rate, oxygen saturations, blood                             pressure, adequacy of pulmonary ventilation, and                             response to care were monitored throughout the                             procedure. The physical statu                          s of the patient was                             re-assessed after the procedure.                            After obtaining informed consent, the colonoscope                             was passed under direct vision. Throughout the                             procedure, the patient's blood pressure, pulse, and                             oxygen saturations were monitored continuously. The                             peds colonoscope 512 was introduced through the                             anus and advanced to the cecum, identified by                             appendiceal orifice and ileocecal valve. The                             ileocecal valve and the appendiceal orifice were                             photographed. The entire colon was well visualized.                             The colonoscopy was performed with ease. The                             patient tolerated the procedure well. The quality                             of the bowel preparation was excellent.                                                                                                             Findings:       The perianal and digital rectal examinations were normal.       A 5 mm polyp was found in the sigmoid colon. The polyp was semi-sessile.        The polyp was removed with a cold snare. Resection and  retrieval were        complete.                                                                                   Impression:               - One 5 mm polyp in the sigmoid colon, removed with                             a cold snare. Resected and retrieved.  Recommendation:           - Discharge patient to home (ambulatory).                            - Await pathology results.                            - If the pathology report reveals adenomatous                             tissue, then repeat the colonoscopy for                             surveillance in 5 years.                                                                                   Procedure Code(s):       --- Professional ---       4                          5385, Colonoscopy, flexible; with removal of tumor(s), polyp(s), or        other lesion(s) by snare technique  Diagnosis Code(s):       --- Professional ---       Z86.010, Personal history of colonic polyps       D12.5, Benign neoplasm of sigmoid colon    CPT copyright 2022 [employer]. All rights reserved.    The codes documented in this report are preliminary and upon  review may   be revised to meet current compliance requirements.    _______________________  ALEJANDRO REDDY MD  1/11/2024 11:10:40 AM  I was physically present for the entire viewing portion of the exam.  ALEJANDRO REDDY MD  Number of Addenda: 0    Note Initiated On: 1/11/2024 10:48 AM  Scope Withdrawal Time: 0 hours 7 minutes 50 seconds   Total Procedure Duration: 0 hours 12 minutes 49 seconds   Scope In: 10:53:24 AM  Scope Out: 11:06:13 AM

## 2024-03-19 NOTE — PROGRESS NOTES
Urology Clinic    Name: Idalia Howe    MRN: [id number]   YOB: 1955  Accompanied at today's visit by:video Danish               Assessment and Plan:   68 year old female with ALLAN, urinary urgency/frequency, myalgia of pelvic floor, pelvic floor dysfunction, fecal smearing, rectal urgency, hx of UTI.     - PVR WNL.   - UA shows WBC but otherwise negative. Denies s/s of UTI today.   - educated patient about UTI prevention and OTC supplements.   - Resume estrogen cream 2x/week; renewed today.  - contact clinic if develops s/s of UTI in the future.   - Start trospium 20mg BID. Discussed risks/benefits and potential side effects of medication. Does have dry mouth; consider myrberiq if fails trospium.   - Avoid bladder irritants  - Referral to PFPT.  - follow-up in 3 months.  - Had large amount of VIDA on exam today. If continues to have VIDA despite PFPT, consider pessary with knob vs anti-incontinence procedure.   - recommend daily fiber supplement for bowels.   - patient requesting referral to OB/GYN for pap smear; referral placed.     Orders Placed This Encounter   Procedures    MEASURE POST-VOID RESIDUAL URINE/BLADDER CAPACITY, US NON-IMAGING (29385)    UA without Microscopic [[id number]]     After discussing the assessment and plan with patient, patient verbalized understanding and agreed to the above plan. All questions answered.     55 minutes were spent today on the date of the encounter in reviewing the EMR, direct patient care, coordination of care and documentation in addition to exam, ordering medications, referral to PFPT, reviewing Dr. Lunsford's note, review labs.     Obdulia Barragan PA-C  March 19, 2024    Patient Care Team:  Akhil Almodovar MD as PCP - General (Family Practice)  Akhil Almodovar MD as Assigned PCP  Obdulia Barragan PA-C as Physician Assistant  AKHIL ALMODOVAR          Chief Complaint:   ALLAN          History of Present Illness:   March 19, 2024    HISTORY: Idalia Howe is a  68 year old female as a new consultation for concerns of ALLAN; VIDA most bothersome. Going every 30minutes at times. Reports getting up at night with leakage secondary to coughing. Hx of chronic cough. Follows with pulmonology. Wears 1 large thick pad per day. Drinks 1 cup of coffee and 2 cups of green tea per day. Denies gross hematuria or history of kidney stones. Reports having some UTIs this past year secondary to not changing her pad frequently enough. Per EMR, has had 2 UTIs over the past 12 months. Denies prolapse symptoms. Not sexually active.. Reports bowels are irregular and has fecal smearing along with rectal urgency. Family hx of colon cancer. Recent colonoscopy on 1/11/24. PMH is significant for lumbar disc disease, hx of thyroid CA s/p surgery. PSH includes left salpingectomy, thyroidectomy. Patient voices no other concerns at this time.            Past Medical History:     Past Medical History:   Diagnosis Date    Back pain     Cancer (H)     HAD THYROID CANCER    COPD (chronic obstructive pulmonary disease) (H)     Hip pain     Hypertension     Neuropathic pain     Thyroid disease     TAKING MEDICATION    Uncomplicated asthma             Past Surgical History:     Past Surgical History:   Procedure Laterality Date    COLONOSCOPY N/A 1/11/2024    Procedure: Colonoscopy;  Surgeon: Rachel Berger MD;  Location:  GI    ESOPHAGOSCOPY, GASTROSCOPY, DUODENOSCOPY (EGD), COMBINED N/A 07/05/2023    Procedure: Esophagoscopy, gastroscopy, duodenoscopy (EGD), combined;  Surgeon: David Garcia MD;  Location:  GI    left salpinectomy Left 2007    left fallopian tube d/t abnormal US findings    THYROIDECTOMY   2004    thyroid cancer            Social History:     Social History     Tobacco Use    Smoking status: Never    Smokeless tobacco: Never   Substance Use Topics    Alcohol use: Yes     Comment: once-twice a month             Family History:     Family History   Problem Relation Age of Onset     Diabetes Mother     Hypertension Mother     Hyperlipidemia Mother     Colon Cancer Paternal Grandmother               Allergies:   No Known Allergies         Medications:     Current Outpatient Medications   Medication Sig    Acetaminophen (TYLENOL PO) Take 325 mg by mouth every 4 hours as needed for mild pain or fever    albuterol (PROAIR RESPICLICK) 108 (90 Base) MCG/ACT inhaler INHALE 1 PUFF INTO THE LUNGS EVERY 6 HOURS AS NEEDED FOR SHORTNESS OF BREATH OR DIFFICULT BREATHING OR WHEEZING Strength: 108 (90 Base) MCG/ACT    azelastine (ASTELIN) 0.1 % nasal spray Spray 1 spray into both nostrils 2 times daily    Calcium Citrate-Vitamin D (CALCIUM + D PO) Take by mouth daily    cetirizine (ZYRTEC) 10 MG tablet Take 1-2 tablets (10-20 mg) by mouth daily    chlorthalidone (HYGROTON) 25 MG tablet Take 0.5 tablets (12.5 mg) by mouth daily (Patient not taking: Reported on 12/6/2023)    conjugated estrogens (PREMARIN) 0.625 MG/GM vaginal cream Place 0.5 g vaginally once a week    fluticasone (FLONASE) 50 MCG/ACT nasal spray Spray 1 spray into both nostrils At Bedtime    gabapentin (NEURONTIN) 100 MG capsule TAKE 1 CAPSULE(100 MG) BY MOUTH TWICE DAILY    guaiFENesin-codeine (ROBITUSSIN AC) 100-10 MG/5ML solution Take 5 mLs by mouth every 8 hours as needed for cough    levothyroxine (SYNTHROID/LEVOTHROID) 88 MCG tablet Take 1 tablet (88 mcg) by mouth daily    losartan (COZAAR) 100 MG tablet Take 1 tablet (100 mg) by mouth daily    montelukast (SINGULAIR) 10 MG tablet TAKE 1 TABLET(10 MG) BY MOUTH AT BEDTIME    Multiple Vitamins-Minerals (PRESERVISION AREDS PO) Take by mouth daily    nitroFURantoin macrocrystal-monohydrate (MACROBID) 100 MG capsule Take 1 capsule (100 mg) by mouth 2 times daily    simvastatin (ZOCOR) 40 MG tablet TAKE 1 TABLET(40 MG) BY MOUTH AT BEDTIME     No current facility-administered medications for this visit.             Review of Systems:    ROS: 14 point ROS neg other than the symptoms noted above  in the HPI.          Physical Exam:   not currently breastfeeding.  Data Unavailable, There is no height or weight on file to calculate BMI., 0 lbs 0 oz  Gen appearance: Age-appropriate appearing female in NAD.   HEENT:  EOMI, conjunctiva clear/white. Normal ROM of neck for age.   Psych:  alert , In no acute distress.  Neuro:  A&Ox3  Skin:  Clear of obvious rashes, ecchymoses.  Resp:  Normal respiratory effort; not in acute respiratory distress.   Vasc:  Regular rate.  lymph:  No obvious LE edema bilaterally.     exam:  Vulva is normal in appearance. Urethra normal.. Positive for large amount of VIDA with reduction of speculum when instructed to cough. Vaginal mucosa atrophic. Myofascial tenderness on internal exam. No prolapse appreciated. Strength 1/5. No obvious masses, lesions, ulcers, bleeding noted on internal or external exam.          Data:    PVR  26mL    Labs:  UA RESULTS:  Recent Labs   Lab Test 03/19/24  0844 12/06/23  1418   COLOR Yellow Yellow   APPEARANCE Clear Clear   URINEGLC Negative Negative   URINEBILI Negative Negative   URINEKETONE Negative Negative   SG 1.015 1.015   UBLD Negative Negative   URINEPH 7.5* 8.5*   PROTEIN Negative Negative   UROBILINOGEN 0.2 0.2   NITRITE Negative Positive*   LEUKEST Trace* Small*   RBCU  --  0-2   WBCU  --  0-5       UC   6/9/23  >100,000 E. Coli (resistant to cipro and levo)  12/6/23 >100,000 Citrobacter koseri (resistant to ampicillin)     Creatinine   Date Value Ref Range Status   12/06/2023 0.56 0.51 - 0.95 mg/dL Final   02/03/2020 0.64 0.52 - 1.04 mg/dL Final     Admission on 01/11/2024, Discharged on 01/11/2024   Component Date Value Ref Range Status    Case Report 01/11/2024    Final                    Value:Surgical Pathology Report                         Case: [id number]                                  Authorizing Provider:  Rachel Berger MD   Collected:           01/11/2024 11:06 AM          Ordering Location:     [facility]           Received:            01/11/2024 11:33 AM                                 [facility]                                                          Pathologist:           Nubia Church MD PhD                                                      Specimen:    Large Intestine, Colon, Sigmoid                                                            Final Diagnosis 01/11/2024    Final                    Value:This result contains rich text formatting which cannot be displayed here.    Clinical Information 01/11/2024    Final                    Value:This result contains rich text formatting which cannot be displayed here.    Gross Description 01/11/2024    Final                    Value:This result contains rich text formatting which cannot be displayed here.    Microscopic Description 01/11/2024    Final                    Value:This result contains rich text formatting which cannot be displayed here.    Performing Labs 01/11/2024    Final                    Value:This result contains rich text formatting which cannot be displayed here.    COLONOSCOPY 01/11/2024    Final                    Value:[facility]  [address] [place]  [address]  _______________________________________________________________________________  Patient Name: Idalia Howe            Procedure Date: 1/11/2024 10:48 AM  MRN: [id number]                       Account Number: [id number]  YOB: 1955             Admit Type: Outpatient  Age: 68                               Room: [place] [address]  Note Status: Finalized                Attending MD: ALEJANDRO REDDY MD,   Instrument Name: 512 [id number]-[id number] Peds Colon   _______________________________________________________________________________     Procedure:                Colonoscopy  Indications:              High risk colon cancer surveillance: Personal                             history of colonic polyps  Providers:                ALEJANDRO REDDY MD, Taylor  KIP Durand  Referring MD:               Medicines:                Midazolam 2 mg IV, Fentanyl 100 micrograms IV  Complications:            No immediate complications. Es                          timated blood loss:                             Minimal.  _______________________________________________________________________________  Procedure:                Pre-Anesthesia Assessment:                            - Prior to the procedure, a History and Physical                             was performed, and patient medications and                             allergies were reviewed. The risks and benefits of                             the procedure and the sedation options and risks                             were discussed with the patient. All questions were                             answered and informed consent was obtained. Patient                             identification and proposed procedure were verified                             by the physician in the procedure room. Mental                             Status Examination: alert and oriented. Airway                             Examination: normal oropharyngeal airway and neck                             mobility.                           Respiratory Examination: clear to                             auscultation. CV Examination: normal. ASA Grade                             Assessment: II - A patient with mild systemic                             disease. After reviewing the risks and benefits,                             the patient was deemed in satisfactory condition to                             undergo the procedure. The anesthesia plan was to                             use moderate sedation / analgesia (conscious                             sedation). Immediately prior to administration of                             medications, the patient was re-assessed for                             adequacy to receive sedatives. The heart rate,                              respiratory rate, oxygen saturations, blood                             pressure, adequacy of pulmonary ventilation, and                             response to care were monitored throughout the                             procedure. The physical statu                          s of the patient was                             re-assessed after the procedure.                            After obtaining informed consent, the colonoscope                             was passed under direct vision. Throughout the                             procedure, the patient's blood pressure, pulse, and                             oxygen saturations were monitored continuously. The                             peds colonoscope 512 was introduced through the                             anus and advanced to the cecum, identified by                             appendiceal orifice and ileocecal valve. The                             ileocecal valve and the appendiceal orifice were                             photographed. The entire colon was well visualized.                             The colonoscopy was performed with ease. The                             patient tolerated the procedure well. The quality                             of the bowel preparation was excellent.                                                                                                             Findings:       The perianal and digital rectal examinations were normal.       A 5 mm polyp was found in the sigmoid colon. The polyp was semi-sessile.        The polyp was removed with a cold snare. Resection and retrieval were        complete.                                                                                   Impression:               - One 5 mm polyp in the sigmoid colon, removed with                             a cold snare. Resected and retrieved.  Recommendation:           - Discharge patient to home (ambulatory).                             - Await pathology results.                            - If the pathology report reveals adenomatous                             tissue, then repeat the colonoscopy for                             surveillance in 5 years.                                                                                   Procedure Code(s):       --- Professional ---       4                          5385, Colonoscopy, flexible; with removal of tumor(s), polyp(s), or        other lesion(s) by snare technique  Diagnosis Code(s):       --- Professional ---       Z86.010, Personal history of colonic polyps       D12.5, Benign neoplasm of sigmoid colon    CPT copyright 2022 [employer]. All rights reserved.    The codes documented in this report are preliminary and upon  review may   be revised to meet current compliance requirements.    _______________________  ALEJANDRO REDDY MD  1/11/2024 11:10:40 AM  I was physically present for the entire viewing portion of the exam.  ALEJANDRO REDDY MD  Number of Addenda: 0    Note Initiated On: 1/11/2024 10:48 AM  Scope Withdrawal Time: 0 hours 7 minutes 50 seconds   Total Procedure Duration: 0 hours 12 minutes 49 seconds   Scope In: 10:53:24 AM  Scope Out: 11:06:13 AM

## 2024-03-29 NOTE — PROGRESS NOTES
"Idalia is a 68 year old No obstetric history on file. female who presents for Medicare Limited exam.     Fall risk:   Fallen 2 or more times in the past year?: No    HPI :  Patient her per request of PCP. She has not had a pelvic exam since 2012. Denies itching, burning, bleeding, discharge, other concerns.     Has not had a mammo, will set on up today.     Has seen urology for incontinence and IVET. Taking estradiol cream, has only used twice so far.     GYNECOLOGIC HISTORY:  No LMP recorded (lmp unknown). Patient is postmenopausal..   reports that she has never smoked. She has never used smokeless tobacco.    STD testing offered? Declined  Last PHQ-9 score on record=       4/1/2024    11:04 AM   PHQ-9 SCORE   PHQ-9 Total Score 2     Last GAD7 score on record=       4/1/2024    11:04 AM   ISABEL-7 SCORE   Total Score 0       HEALTH MAINTENANCE:  Cholesterol: (  Cholesterol   Date Value Ref Range Status   12/06/2023 201 (H) <200 mg/dL Final   03/23/2023 194 <200 mg/dL Final   02/03/2020 205 (H) <200 mg/dL Final     Comment:     Desirable:       <200 mg/dl   01/09/2019 215 (H) <200 mg/dL Final     Comment:     Desirable:       <200 mg/dl      Last Mammo:  02/04/2019 , Result: Normal, Next Mammo: Due   Pap: (No results found for: \"PAP\" )  DEXA:  02/04/2019  Colonoscopy:  01/11/2024, Result:  Normal, Next Colonoscopy: 5 years.    HISTORY:  OB History   No obstetric history on file.     Past Medical History:   Diagnosis Date    Back pain     Cancer (H)     HAD THYROID CANCER    COPD (chronic obstructive pulmonary disease) (H)     Hip pain     Hypertension     Neuropathic pain     Thyroid disease     TAKING MEDICATION    Uncomplicated asthma      Past Surgical History:   Procedure Laterality Date    COLONOSCOPY N/A 01/11/2024    Procedure: Colonoscopy;  Surgeon: Rachel Berger MD;  Location:  GI    ESOPHAGOSCOPY, GASTROSCOPY, DUODENOSCOPY (EGD), COMBINED N/A 07/05/2023    Procedure: Esophagoscopy, gastroscopy, " duodenoscopy (EGD), combined;  Surgeon: David Garcia MD;  Location: [facility]    left salpinectomy Left 2007    left fallopian tube d/t abnormal US findings    THYROIDECTOMY   2004    thyroid cancer    TUBAL LIGATION       Family History   Problem Relation Age of Onset    Diabetes Mother     Hypertension Mother     Hyperlipidemia Mother     Colon Cancer Paternal Grandmother      Social History     Socioeconomic History    Marital status:     Number of children: 1   Tobacco Use    Smoking status: Never    Smokeless tobacco: Never   Vaping Use    Vaping Use: Never used   Substance and Sexual Activity    Alcohol use: Yes     Comment: once-twice a month     Drug use: No    Sexual activity: Not Currently     Partners: Male     Birth control/protection: None, Post-menopausal     Social Determinants of Health     Financial Resource Strain: Low Risk  (12/6/2023)    Financial Resource Strain     Within the past 12 months, have you or your family members you live with been unable to get utilities (heat, electricity) when it was really needed?: No   Food Insecurity: Low Risk  (12/6/2023)    Food Insecurity     Within the past 12 months, did you worry that your food would run out before you got money to buy more?: No     Within the past 12 months, did the food you bought just not last and you didn t have money to get more?: No   Transportation Needs: Low Risk  (12/6/2023)    Transportation Needs     Within the past 12 months, has lack of transportation kept you from medical appointments, getting your medicines, non-medical meetings or appointments, work, or from getting things that you need?: No   Interpersonal Safety: Low Risk  (12/6/2023)    Interpersonal Safety     Do you feel physically and emotionally safe where you currently live?: Yes     Within the past 12 months, have you been hit, slapped, kicked or otherwise physically hurt by someone?: No     Within the past 12 months, have you been humiliated or emotionally  abused in other ways by your partner or ex-partner?: No   Housing Stability: Low Risk  (12/6/2023)    Housing Stability     Do you have housing? : Yes     Are you worried about losing your housing?: No     Current Outpatient Medications   Medication Sig    Acetaminophen (TYLENOL PO) Take 325 mg by mouth every 4 hours as needed for mild pain or fever    albuterol (PROAIR RESPICLICK) 108 (90 Base) MCG/ACT inhaler INHALE 1 PUFF INTO THE LUNGS EVERY 6 HOURS AS NEEDED FOR SHORTNESS OF BREATH OR DIFFICULT BREATHING OR WHEEZING Strength: 108 (90 Base) MCG/ACT    Ascorbic Acid (VITAMIN C) 500 MG CAPS     Calcium Citrate-Vitamin D (CALCIUM + D PO) Take by mouth daily    cetirizine (ZYRTEC) 10 MG tablet Take 1-2 tablets (10-20 mg) by mouth daily    chlorthalidone (HYGROTON) 25 MG tablet Take 0.5 tablets (12.5 mg) by mouth daily    conjugated estrogens (PREMARIN) 0.625 MG/GM vaginal cream Place 1 g vaginally twice a week    gabapentin (NEURONTIN) 100 MG capsule TAKE 1 CAPSULE(100 MG) BY MOUTH TWICE DAILY    levothyroxine (SYNTHROID/LEVOTHROID) 88 MCG tablet Take 1 tablet (88 mcg) by mouth daily    losartan (COZAAR) 100 MG tablet Take 1 tablet (100 mg) by mouth daily    Multiple Vitamins-Minerals (PRESERVISION AREDS PO) Take by mouth daily    simvastatin (ZOCOR) 40 MG tablet TAKE 1 TABLET(40 MG) BY MOUTH AT BEDTIME    vitamin B complex with vitamin C (VITAMIN  B COMPLEX) tablet Take 1 tablet by mouth daily    Vitamin D, Cholecalciferol, 25 MCG (1000 UT) CAPS     fluticasone (FLONASE) 50 MCG/ACT nasal spray Spray 1 spray into both nostrils At Bedtime (Patient not taking: Reported on 4/1/2024)    guaiFENesin-codeine (ROBITUSSIN AC) 100-10 MG/5ML solution Take 5 mLs by mouth every 8 hours as needed for cough (Patient not taking: Reported on 4/1/2024)    montelukast (SINGULAIR) 10 MG tablet TAKE 1 TABLET(10 MG) BY MOUTH AT BEDTIME (Patient not taking: Reported on 4/1/2024)    nitroFURantoin macrocrystal-monohydrate (MACROBID) 100  "MG capsule Take 1 capsule (100 mg) by mouth 2 times daily (Patient not taking: Reported on 4/1/2024)    trospium (SANCTURA) 20 MG tablet Take 1 tablet (20 mg) by mouth 2 times daily (before meals) (Patient not taking: Reported on 4/1/2024)     No current facility-administered medications for this visit.     Allergies   Allergen Reactions    Sulfa Antibiotics Nausea       Past medical, surgical, social and family history were reviewed and updated in EPIC.    EXAM:  /80 (BP Location: Right arm)   Ht 1.575 m (5' 2\")   Wt 71.2 kg (157 lb)   LMP  (LMP Unknown)   BMI 28.72 kg/m     BMI: Body mass index is 28.72 kg/m .    Constitutional: Appearance: Well nourished, well developed alert, in no acute distress  Breasts: Inspection of Breasts:  No lymphadenopathy present    Palpation of Breasts and Axillae:  No masses present on palpation, no  breast tenderness    Axillary Lymph Nodes:  No lymphadenopathy present  Neurologic/Psychiatric:    Mental Status:  Oriented X3     Pelvic Exam:  External Genitalia:     Normal appearance for age, no discharge present, no tenderness present, no inflammatory lesions present, color normal  Vagina:     Normal vaginal vault without central or paravaginal defects, ATROPHIC thin dry erythema   Bladder:     Nontender to palpation  Urethra:   Urethral Body:  Urethra palpation normal, urethra structural support normal   Urethral Meatus:  No erythema or lesions present  Cervix:     Appearance healthy, no lesions present, nontender to palpation, no bleeding present  Uterus:     Nontender to palpation, no masses present, position anteflexed, mobility: normal  Adnexa:     No adnexal tenderness present, no adnexal masses present  Perineum:     Perineum within normal limits, no evidence of trauma, no rashes or skin lesions present  Inguinal Lymph Nodes:     No lymphadenopathy present    Body mass index is 28.72 kg/m .     reports that she has never smoked. She has never used smokeless " tobacco.      ASSESSMENT:  68 year old female with satisfactory annual exam.    ICD-10-CM    1. Encounter for gynecological examination without abnormal finding  Z01.419 Pap thin layer screen with HPV - recommended age 30 - 65 years      2. Pap smear for cervical cancer screening  Z12.4 Ob/Gyn  Referral     Pap thin layer screen with HPV - recommended age 30 - 65 years      3. Vaginal atrophy  N95.2         Plan:  -patient here today for vaginal exam  -pap done today  -patient to continue cares with PCP  -schedule mammo on way out today  -schedule Dexa  -continue follow up with urology    Melody Antonio PA-C

## 2024-04-01 ENCOUNTER — OFFICE VISIT (OUTPATIENT)
Dept: OBGYN | Facility: CLINIC | Age: 69
End: 2024-04-01
Attending: PHYSICIAN ASSISTANT
Payer: MEDICARE

## 2024-04-01 VITALS
WEIGHT: 157 LBS | SYSTOLIC BLOOD PRESSURE: 120 MMHG | BODY MASS INDEX: 28.89 KG/M2 | DIASTOLIC BLOOD PRESSURE: 80 MMHG | HEIGHT: 62 IN

## 2024-04-01 DIAGNOSIS — Z12.4 PAP SMEAR FOR CERVICAL CANCER SCREENING: ICD-10-CM

## 2024-04-01 DIAGNOSIS — Z01.419 ENCOUNTER FOR GYNECOLOGICAL EXAMINATION WITHOUT ABNORMAL FINDING: Primary | ICD-10-CM

## 2024-04-01 DIAGNOSIS — N95.2 VAGINAL ATROPHY: ICD-10-CM

## 2024-04-01 PROCEDURE — 99207 PR ANNUAL WELLNESS VISIT, PPS, SUBSEQUENT STAT: CPT

## 2024-04-01 PROCEDURE — 87624 HPV HI-RISK TYP POOLED RSLT: CPT

## 2024-04-01 PROCEDURE — 99459 PELVIC EXAMINATION: CPT

## 2024-04-01 PROCEDURE — G0101 CA SCREEN;PELVIC/BREAST EXAM: HCPCS

## 2024-04-01 RX ORDER — MULTIVIT-MIN/IRON/FOLIC ACID/K 18-600-40
CAPSULE ORAL
COMMUNITY

## 2024-04-01 RX ORDER — FAMOTIDINE 20 MG
TABLET ORAL
COMMUNITY

## 2024-04-01 ASSESSMENT — ANXIETY QUESTIONNAIRES
IF YOU CHECKED OFF ANY PROBLEMS ON THIS QUESTIONNAIRE, HOW DIFFICULT HAVE THESE PROBLEMS MADE IT FOR YOU TO DO YOUR WORK, TAKE CARE OF THINGS AT HOME, OR GET ALONG WITH OTHER PEOPLE: SOMEWHAT DIFFICULT
GAD7 TOTAL SCORE: 0
2. NOT BEING ABLE TO STOP OR CONTROL WORRYING: NOT AT ALL
1. FEELING NERVOUS, ANXIOUS, OR ON EDGE: NOT AT ALL
GAD7 TOTAL SCORE: 0
3. WORRYING TOO MUCH ABOUT DIFFERENT THINGS: NOT AT ALL
5. BEING SO RESTLESS THAT IT IS HARD TO SIT STILL: NOT AT ALL
6. BECOMING EASILY ANNOYED OR IRRITABLE: NOT AT ALL
7. FEELING AFRAID AS IF SOMETHING AWFUL MIGHT HAPPEN: NOT AT ALL

## 2024-04-01 ASSESSMENT — PATIENT HEALTH QUESTIONNAIRE - PHQ9
5. POOR APPETITE OR OVEREATING: NOT AT ALL
SUM OF ALL RESPONSES TO PHQ QUESTIONS 1-9: 2

## 2024-04-03 LAB
BKR LAB AP GYN ADEQUACY: NORMAL
BKR LAB AP GYN INTERPRETATION: NORMAL
BKR LAB AP HPV REFLEX: NORMAL
BKR LAB AP PREVIOUS ABNORMAL: NORMAL
PATH REPORT.COMMENTS IMP SPEC: NORMAL
PATH REPORT.COMMENTS IMP SPEC: NORMAL
PATH REPORT.RELEVANT HX SPEC: NORMAL

## 2024-04-05 LAB
HUMAN PAPILLOMA VIRUS 16 DNA: NEGATIVE
HUMAN PAPILLOMA VIRUS 18 DNA: NEGATIVE
HUMAN PAPILLOMA VIRUS FINAL DIAGNOSIS: NORMAL
HUMAN PAPILLOMA VIRUS OTHER HR: NEGATIVE

## 2024-04-08 ENCOUNTER — ANCILLARY PROCEDURE (OUTPATIENT)
Dept: MAMMOGRAPHY | Facility: CLINIC | Age: 69
End: 2024-04-08
Payer: MEDICARE

## 2024-04-08 DIAGNOSIS — Z00.01 ENCOUNTER FOR GENERAL ADULT MEDICAL EXAMINATION WITH ABNORMAL FINDINGS: ICD-10-CM

## 2024-04-08 DIAGNOSIS — Z12.31 VISIT FOR SCREENING MAMMOGRAM: ICD-10-CM

## 2024-04-08 PROCEDURE — 77067 SCR MAMMO BI INCL CAD: CPT | Mod: TC | Performed by: RADIOLOGY

## 2024-06-01 DIAGNOSIS — R05.3 CHRONIC COUGH: ICD-10-CM

## 2024-06-01 DIAGNOSIS — E03.9 HYPOTHYROIDISM, UNSPECIFIED TYPE: ICD-10-CM

## 2024-06-03 ENCOUNTER — OFFICE VISIT (OUTPATIENT)
Dept: FAMILY MEDICINE | Facility: CLINIC | Age: 69
End: 2024-06-03
Payer: MEDICARE

## 2024-06-03 ENCOUNTER — ORDERS ONLY (AUTO-RELEASED) (OUTPATIENT)
Dept: FAMILY MEDICINE | Facility: CLINIC | Age: 69
End: 2024-06-03
Payer: MEDICARE

## 2024-06-03 VITALS
TEMPERATURE: 97.1 F | OXYGEN SATURATION: 96 % | BODY MASS INDEX: 28.35 KG/M2 | WEIGHT: 155 LBS | DIASTOLIC BLOOD PRESSURE: 86 MMHG | HEART RATE: 58 BPM | SYSTOLIC BLOOD PRESSURE: 138 MMHG | RESPIRATION RATE: 16 BRPM

## 2024-06-03 DIAGNOSIS — I10 BENIGN ESSENTIAL HYPERTENSION: ICD-10-CM

## 2024-06-03 DIAGNOSIS — R00.2 PALPITATIONS: ICD-10-CM

## 2024-06-03 DIAGNOSIS — E03.9 HYPOTHYROIDISM, UNSPECIFIED TYPE: Primary | ICD-10-CM

## 2024-06-03 DIAGNOSIS — G47.09 OTHER INSOMNIA: ICD-10-CM

## 2024-06-03 DIAGNOSIS — C73 MALIGNANT NEOPLASM OF THYROID GLAND (H): ICD-10-CM

## 2024-06-03 DIAGNOSIS — R05.3 CHRONIC COUGH: ICD-10-CM

## 2024-06-03 DIAGNOSIS — J42 CHRONIC BRONCHITIS, UNSPECIFIED CHRONIC BRONCHITIS TYPE (H): ICD-10-CM

## 2024-06-03 DIAGNOSIS — E78.5 HYPERLIPIDEMIA LDL GOAL <160: ICD-10-CM

## 2024-06-03 PROCEDURE — 99214 OFFICE O/P EST MOD 30 MIN: CPT | Performed by: FAMILY MEDICINE

## 2024-06-03 PROCEDURE — 84439 ASSAY OF FREE THYROXINE: CPT | Performed by: FAMILY MEDICINE

## 2024-06-03 PROCEDURE — 80048 BASIC METABOLIC PNL TOTAL CA: CPT | Performed by: FAMILY MEDICINE

## 2024-06-03 PROCEDURE — 36415 COLL VENOUS BLD VENIPUNCTURE: CPT | Performed by: FAMILY MEDICINE

## 2024-06-03 PROCEDURE — 83721 ASSAY OF BLOOD LIPOPROTEIN: CPT | Performed by: FAMILY MEDICINE

## 2024-06-03 PROCEDURE — 84443 ASSAY THYROID STIM HORMONE: CPT | Performed by: FAMILY MEDICINE

## 2024-06-03 PROCEDURE — G2211 COMPLEX E/M VISIT ADD ON: HCPCS | Performed by: FAMILY MEDICINE

## 2024-06-03 RX ORDER — ESZOPICLONE 2 MG/1
2 TABLET, FILM COATED ORAL AT BEDTIME
Qty: 30 TABLET | Refills: 0 | Status: SHIPPED | OUTPATIENT
Start: 2024-06-03

## 2024-06-03 RX ORDER — FLUTICASONE FUROATE, UMECLIDINIUM BROMIDE AND VILANTEROL TRIFENATATE 200; 62.5; 25 UG/1; UG/1; UG/1
POWDER RESPIRATORY (INHALATION)
COMMUNITY
Start: 2024-05-07

## 2024-06-03 RX ORDER — GABAPENTIN 100 MG/1
100 CAPSULE ORAL 2 TIMES DAILY
Qty: 180 CAPSULE | Refills: 1 | Status: SHIPPED | OUTPATIENT
Start: 2024-06-03 | End: 2024-07-01

## 2024-06-03 RX ORDER — GABAPENTIN 100 MG/1
100 CAPSULE ORAL 2 TIMES DAILY
Qty: 180 CAPSULE | Refills: 0 | Status: SHIPPED | OUTPATIENT
Start: 2024-06-03 | End: 2024-06-03

## 2024-06-03 RX ORDER — LOSARTAN POTASSIUM 100 MG/1
100 TABLET ORAL DAILY
Qty: 90 TABLET | Refills: 3 | Status: SHIPPED | OUTPATIENT
Start: 2024-06-03

## 2024-06-03 RX ORDER — LEVOTHYROXINE SODIUM 88 UG/1
88 TABLET ORAL DAILY
Qty: 90 TABLET | Refills: 1 | Status: SHIPPED | OUTPATIENT
Start: 2024-06-03 | End: 2024-06-05

## 2024-06-03 ASSESSMENT — PAIN SCALES - GENERAL: PAINLEVEL: NO PAIN (0)

## 2024-06-03 NOTE — PROGRESS NOTES
"  Assessment & Plan     Hypothyroidism, unspecified type  has been fluctuating with tiredness over past 3-4 months  Will review the lab and update pt   - LDL cholesterol direct; Future  - Basic metabolic panel  (Ca, Cl, CO2, Creat, Gluc, K, Na, BUN); Future  - TSH with free T4 reflex; Future    Hyperlipidemia LDL goal <160  Stable   - LDL cholesterol direct; Future  - Basic metabolic panel  (Ca, Cl, CO2, Creat, Gluc, K, Na, BUN); Future  - TSH with free T4 reflex; Future    Chronic bronchitis, unspecified chronic bronchitis type (H)  Seeing pulmonology and taking Trelegy, but still coughing chronically, will have her to resume gabapentin and update us in 1months for dose adjustment if indicated     Malignant neoplasm of thyroid gland (H)  Stable     Chronic cough  Mentioned above   - gabapentin (NEURONTIN) 100 MG capsule; Take 1 capsule (100 mg) by mouth 2 times daily    Benign essential hypertension  Has been stable with current dose of meds, will keep monitoring   - losartan (COZAAR) 100 MG tablet; Take 1 tablet (100 mg) by mouth daily    Other insomnia  Has been worsening with cough and stress, will have her to try lunesta for next 1-2 months and update us  - eszopiclone (LUNESTA) 2 MG tablet; Take 1 tablet (2 mg) by mouth at bedtime    Palpitations  Has been worsening palpitation and SOB over past 2-3 months, has 1-2 episode per week, will have her to check on zio patch for further evaluation  - ZIO PATCH MAIL OUT; Future          BMI  Estimated body mass index is 28.35 kg/m  as calculated from the following:    Height as of 4/1/24: 1.575 m (5' 2\").    Weight as of this encounter: 70.3 kg (155 lb).   Weight management plan: Discussed healthy diet and exercise guidelines      FUTURE APPOINTMENTS:       - Follow-up visit in 2 months for insomnia and cough     Subjective   Idalia is a 68 year old, presenting for the following health issues:  Medication Follow-up (Hypertension and cough. )        6/3/2024    11:21 " AM   Additional Questions   Roomed by Sanaz DIANE     History of Present Illness       Reason for visit:  Check up, and to address concerns    She eats 0-1 servings of fruits and vegetables daily.She consumes 0 sweetened beverage(s) daily.She exercises with enough effort to increase her heart rate 20 to 29 minutes per day.  She exercises with enough effort to increase her heart rate 3 or less days per week.   She is taking medications regularly.           Hypertension Follow-up    Do you check your blood pressure regularly outside of the clinic? No   Are you following a low salt diet? Yes  Are your blood pressures ever more than 140 on the top number (systolic) OR more   than 90 on the bottom number (diastolic), for example 140/90? Yes  How many servings of fruits and vegetables do you eat daily?  0-1  On average, how many sweetened beverages do you drink each day (Examples: soda, juice, sweet tea, etc.  Do NOT count diet or artificially sweetened beverages)?   0  How many days per week do you exercise enough to make your heart beat faster? 3 or less  How many minutes a day do you exercise enough to make your heart beat faster? 20 - 29  How many days per week do you miss taking your medication? 0    Medication Followup of Inhalers   Taking Medication as prescribed: yes  Side Effects:  None  Medication Helping Symptoms:  NO        Review of Systems  Constitutional, HEENT, cardiovascular, pulmonary, GI, , musculoskeletal, neuro, skin, endocrine and psych systems are negative, except as otherwise noted.      Objective    /86   Pulse 58   Temp 97.1  F (36.2  C) (Temporal)   Resp 16   Wt 70.3 kg (155 lb)   LMP  (LMP Unknown)   SpO2 96%   BMI 28.35 kg/m    Body mass index is 28.35 kg/m .  Physical Exam   GENERAL: alert and no distress  EYES: Eyes grossly normal to inspection, PERRL and conjunctivae and sclerae normal  HENT: ear canals and TM's normal, nose and mouth without ulcers or lesions  NECK: no  adenopathy, no asymmetry, masses, or scars  RESP: lungs clear to auscultation - no rales, rhonchi or wheezes  CV: regular rate and rhythm, normal S1 S2, no S3 or S4, no murmur, click or rub, no peripheral edema  ABDOMEN: soft, nontender, no hepatosplenomegaly, no masses and bowel sounds normal  MS: no gross musculoskeletal defects noted, no edema  SKIN: no suspicious lesions or rashes  NEURO: Normal strength and tone, mentation intact and speech normal  BACK: no CVA tenderness, no paralumbar tenderness            Signed Electronically by: Akhil Almodovar MD      The longitudinal plan of care for the diagnosis(es)/condition(s) as documented were addressed during this visit. Due to the added complexity in care, I will continue to support Idalia in the subsequent management and with ongoing continuity of care.

## 2024-06-04 LAB
ANION GAP SERPL CALCULATED.3IONS-SCNC: 12 MMOL/L (ref 7–15)
BUN SERPL-MCNC: 13.8 MG/DL (ref 8–23)
CALCIUM SERPL-MCNC: 9.5 MG/DL (ref 8.8–10.2)
CHLORIDE SERPL-SCNC: 104 MMOL/L (ref 98–107)
CREAT SERPL-MCNC: 0.76 MG/DL (ref 0.51–0.95)
DEPRECATED HCO3 PLAS-SCNC: 26 MMOL/L (ref 22–29)
EGFRCR SERPLBLD CKD-EPI 2021: 84 ML/MIN/1.73M2
GLUCOSE SERPL-MCNC: 92 MG/DL (ref 70–99)
LDLC SERPL DIRECT ASSAY-MCNC: 76 MG/DL
POTASSIUM SERPL-SCNC: 3.7 MMOL/L (ref 3.4–5.3)
SODIUM SERPL-SCNC: 142 MMOL/L (ref 135–145)
T4 FREE SERPL-MCNC: 1.97 NG/DL (ref 0.9–1.7)
TSH SERPL DL<=0.005 MIU/L-ACNC: 0.13 UIU/ML (ref 0.3–4.2)

## 2024-06-05 RX ORDER — LEVOTHYROXINE SODIUM 50 UG/1
50 TABLET ORAL DAILY
Qty: 90 TABLET | Refills: 1 | Status: SHIPPED | OUTPATIENT
Start: 2024-06-05

## 2024-06-21 ENCOUNTER — TRANSFERRED RECORDS (OUTPATIENT)
Dept: HEALTH INFORMATION MANAGEMENT | Facility: CLINIC | Age: 69
End: 2024-06-21
Payer: MEDICARE

## 2024-06-28 DIAGNOSIS — E78.5 HYPERLIPIDEMIA LDL GOAL <160: ICD-10-CM

## 2024-06-28 RX ORDER — SIMVASTATIN 40 MG
TABLET ORAL
Qty: 90 TABLET | Refills: 1 | OUTPATIENT
Start: 2024-06-28

## 2024-06-28 NOTE — TELEPHONE ENCOUNTER
Script sent to the pharmacy on 3/12/24 for 90 tablets with 1 additional refill on file. Patient should have a refill on file with the pharmacy.    Za Waters RN

## 2024-06-30 DIAGNOSIS — J32.9 PURULENT POSTNASAL DRAINAGE: ICD-10-CM

## 2024-07-01 DIAGNOSIS — R05.3 CHRONIC COUGH: ICD-10-CM

## 2024-07-01 DIAGNOSIS — E78.5 HYPERLIPIDEMIA LDL GOAL <160: ICD-10-CM

## 2024-07-01 RX ORDER — SIMVASTATIN 40 MG
40 TABLET ORAL AT BEDTIME
Qty: 90 TABLET | Refills: 1 | Status: SHIPPED | OUTPATIENT
Start: 2024-07-01

## 2024-07-01 RX ORDER — CETIRIZINE HYDROCHLORIDE 10 MG/1
TABLET ORAL
Qty: 180 TABLET | Refills: 1 | Status: SHIPPED | OUTPATIENT
Start: 2024-07-01

## 2024-07-01 RX ORDER — GABAPENTIN 100 MG/1
100 CAPSULE ORAL 2 TIMES DAILY
Qty: 180 CAPSULE | Refills: 1 | Status: SHIPPED | OUTPATIENT
Start: 2024-07-01

## 2024-07-16 ENCOUNTER — TELEPHONE (OUTPATIENT)
Dept: UROLOGY | Facility: CLINIC | Age: 69
End: 2024-07-16

## 2024-07-16 PROCEDURE — 93248 EXT ECG>7D<15D REV&INTERPJ: CPT | Performed by: INTERNAL MEDICINE

## 2024-09-09 DIAGNOSIS — J32.9 PURULENT POSTNASAL DRAINAGE: ICD-10-CM

## 2024-09-09 DIAGNOSIS — J45.41 MODERATE PERSISTENT ASTHMA WITH ACUTE EXACERBATION: ICD-10-CM

## 2024-09-09 RX ORDER — MONTELUKAST SODIUM 10 MG/1
TABLET ORAL
Qty: 90 TABLET | Refills: 1 | Status: SHIPPED | OUTPATIENT
Start: 2024-09-09

## 2024-11-12 ENCOUNTER — MYC MEDICAL ADVICE (OUTPATIENT)
Dept: FAMILY MEDICINE | Facility: CLINIC | Age: 69
End: 2024-11-12
Payer: MEDICARE

## 2024-11-13 NOTE — TELEPHONE ENCOUNTER
Spoke with patient though . She was given appointment with PCP to address her cough.       Shanna Diego RN  [facility]

## 2024-11-14 ENCOUNTER — ANCILLARY PROCEDURE (OUTPATIENT)
Dept: GENERAL RADIOLOGY | Facility: CLINIC | Age: 69
End: 2024-11-14
Attending: FAMILY MEDICINE
Payer: COMMERCIAL

## 2024-11-14 ENCOUNTER — OFFICE VISIT (OUTPATIENT)
Dept: FAMILY MEDICINE | Facility: CLINIC | Age: 69
End: 2024-11-14
Payer: MEDICARE

## 2024-11-14 VITALS
DIASTOLIC BLOOD PRESSURE: 74 MMHG | BODY MASS INDEX: 27.25 KG/M2 | SYSTOLIC BLOOD PRESSURE: 119 MMHG | HEART RATE: 78 BPM | TEMPERATURE: 100.1 F | WEIGHT: 149 LBS | RESPIRATION RATE: 20 BRPM | OXYGEN SATURATION: 94 %

## 2024-11-14 DIAGNOSIS — R11.2 NAUSEA AND VOMITING, UNSPECIFIED VOMITING TYPE: ICD-10-CM

## 2024-11-14 DIAGNOSIS — J20.9 ACUTE BRONCHITIS WITH SYMPTOMS > 10 DAYS: ICD-10-CM

## 2024-11-14 DIAGNOSIS — K29.00 ACUTE GASTRITIS WITHOUT HEMORRHAGE, UNSPECIFIED GASTRITIS TYPE: ICD-10-CM

## 2024-11-14 DIAGNOSIS — R50.9 FEVER, UNSPECIFIED FEVER CAUSE: ICD-10-CM

## 2024-11-14 DIAGNOSIS — R50.9 FEVER, UNSPECIFIED FEVER CAUSE: Primary | ICD-10-CM

## 2024-11-14 DIAGNOSIS — R05.1 ACUTE COUGH: ICD-10-CM

## 2024-11-14 LAB
ALBUMIN SERPL BCG-MCNC: 4 G/DL (ref 3.5–5.2)
ALP SERPL-CCNC: 65 U/L (ref 40–150)
ALT SERPL W P-5'-P-CCNC: 22 U/L (ref 0–50)
AST SERPL W P-5'-P-CCNC: 40 U/L (ref 0–45)
BILIRUB DIRECT SERPL-MCNC: <0.2 MG/DL (ref 0–0.3)
BILIRUB SERPL-MCNC: 0.7 MG/DL
FLUAV AG SPEC QL IA: NEGATIVE
FLUBV AG SPEC QL IA: NEGATIVE
LIPASE SERPL-CCNC: 20 U/L (ref 13–60)
PROT SERPL-MCNC: 7.9 G/DL (ref 6.4–8.3)

## 2024-11-14 PROCEDURE — 80076 HEPATIC FUNCTION PANEL: CPT | Performed by: FAMILY MEDICINE

## 2024-11-14 PROCEDURE — 71046 X-RAY EXAM CHEST 2 VIEWS: CPT | Mod: TC | Performed by: RADIOLOGY

## 2024-11-14 PROCEDURE — 87804 INFLUENZA ASSAY W/OPTIC: CPT | Performed by: FAMILY MEDICINE

## 2024-11-14 PROCEDURE — 83690 ASSAY OF LIPASE: CPT | Performed by: FAMILY MEDICINE

## 2024-11-14 PROCEDURE — 36415 COLL VENOUS BLD VENIPUNCTURE: CPT | Performed by: FAMILY MEDICINE

## 2024-11-14 PROCEDURE — 87635 SARS-COV-2 COVID-19 AMP PRB: CPT | Performed by: FAMILY MEDICINE

## 2024-11-14 PROCEDURE — 99215 OFFICE O/P EST HI 40 MIN: CPT | Mod: 25 | Performed by: FAMILY MEDICINE

## 2024-11-14 PROCEDURE — 96372 THER/PROPH/DIAG INJ SC/IM: CPT | Performed by: FAMILY MEDICINE

## 2024-11-14 RX ORDER — CEFTRIAXONE SODIUM 1 G
1 VIAL (EA) INJECTION ONCE
Status: COMPLETED | OUTPATIENT
Start: 2024-11-14 | End: 2024-11-14

## 2024-11-14 RX ORDER — CODEINE PHOSPHATE AND GUAIFENESIN 10; 100 MG/5ML; MG/5ML
1 SOLUTION ORAL EVERY 6 HOURS PRN
Qty: 250 ML | Refills: 1 | Status: SHIPPED | OUTPATIENT
Start: 2024-11-14

## 2024-11-14 RX ORDER — ACETAMINOPHEN 325 MG/1
975 TABLET ORAL ONCE
Status: COMPLETED | OUTPATIENT
Start: 2024-11-14 | End: 2024-11-14

## 2024-11-14 RX ORDER — CEFDINIR 300 MG/1
300 CAPSULE ORAL 2 TIMES DAILY
Qty: 20 CAPSULE | Refills: 0 | Status: SHIPPED | OUTPATIENT
Start: 2024-11-14

## 2024-11-14 RX ORDER — ACETAMINOPHEN 325 MG/1
975 TABLET ORAL ONCE
Qty: 3 TABLET | Refills: 0 | Status: SHIPPED | OUTPATIENT
Start: 2024-11-14 | End: 2024-11-14 | Stop reason: DRUGHIGH

## 2024-11-14 RX ORDER — ONDANSETRON 4 MG/1
4 TABLET, FILM COATED ORAL EVERY 12 HOURS PRN
Qty: 20 TABLET | Refills: 0 | Status: SHIPPED | OUTPATIENT
Start: 2024-11-14

## 2024-11-14 RX ORDER — CEFTRIAXONE 1 G/1
1000 INJECTION, POWDER, FOR SOLUTION INTRAMUSCULAR; INTRAVENOUS ONCE
Qty: 1 EACH | Refills: 0 | Status: SHIPPED | OUTPATIENT
Start: 2024-11-14 | End: 2024-11-14

## 2024-11-14 RX ORDER — SUCRALFATE 1 G/1
1 TABLET ORAL 2 TIMES DAILY
Qty: 20 TABLET | Refills: 1 | Status: SHIPPED | OUTPATIENT
Start: 2024-11-14

## 2024-11-14 RX ADMIN — ACETAMINOPHEN 975 MG: 325 TABLET ORAL at 14:36

## 2024-11-14 RX ADMIN — Medication 1 G: at 15:00

## 2024-11-14 ASSESSMENT — PAIN SCALES - GENERAL: PAINLEVEL_OUTOF10: MODERATE PAIN (5)

## 2024-11-14 NOTE — PROGRESS NOTES
Assessment & Plan     Fever, unspecified fever cause    - COVID-19 Virus (Coronavirus) by PCR Nose  - Influenza A & B Antigen - Clinic Collect  - XR Chest 2 Views; Future  - acetaminophen (TYLENOL) tablet 975 mg    Acute cough  Has been over past 2 weeks with worsening SOB, she has fever up to 102,   - guaiFENesin-codeine (ROBITUSSIN AC) 100-10 MG/5ML solution; Take 5 mLs by mouth every 6 hours as needed.    Acute bronchitis with symptoms > 10 days  XR showed no abnormal finding, will have her to start abx   Her influenza screening test is negative   - cefTRIAXone (ROCEPHIN) 1 GM vial; Inject 1 g (1,000 mg) over 15-30 minutes into the muscle once for 1 dose.  - cefTRIAXone (ROCEPHIN) in NS for IM administration 1 g  - cefdinir (OMNICEF) 300 MG capsule; Take 1 capsule (300 mg) by mouth 2 times daily.  - guaiFENesin-codeine (ROBITUSSIN AC) 100-10 MG/5ML solution; Take 5 mLs by mouth every 6 hours as needed.    Nausea and vomiting, unspecified vomiting type  Has been taking tylenol about 6-7 tab per day due to pain and fever, she has abd pain on midgastric area, will have her to try taking carafate and zofran  Will review LFT and Lipase for further evaluation   - ondansetron (ZOFRAN) 4 MG tablet; Take 1 tablet (4 mg) by mouth every 12 hours as needed for nausea.  - Hepatic panel (Albumin, ALT, AST, Bili, Alk Phos, TP); Future  - Lipase; Future    Acute gastritis without hemorrhage, unspecified gastritis type  Mentioned above   - sucralfate (CARAFATE) 1 GM tablet; Take 1 tablet (1 g) by mouth 2 times daily.  - Hepatic panel (Albumin, ALT, AST, Bili, Alk Phos, TP); Future  - Lipase; Future      FUTURE APPOINTMENTS:       - Follow-up visit in 2 weeks if not improving     Subjective   Idalia is a 68 year old, presenting for the following health issues:  Cough and Fever        11/14/2024     1:12 PM   Additional Questions   Roomed by Jose     History of Present Illness       Reason for visit:  Having headaches due to  coughing, coughing, stuffy sinus, feel nauseous, throwing up.  Symptom onset:  1-3 days ago  Symptoms include:  Coughing, sinus problems, headaches, nauseous  Symptom intensity:  Moderate  Symptom progression:  Worsening  Had these symptoms before:  Yes  Has tried/received treatment for these symptoms:  Yes  Previous treatment was successful:  Yes  Prior treatment description:  Antibiotics with pain medications  What makes it worse:  Not sure  What makes it better:  Not sure   She is taking medications regularly.         Acute Illness  Acute illness concerns: Cough and fever  Onset/Duration: 2-3 weeks   Symptoms:  Fever: YES  Chills/Sweats: YES  Headache (location?): YES  Sinus Pressure: No  Conjunctivitis:  No  Ear Pain: no  Rhinorrhea: YES  Congestion: YES  Sore Throat: No  Cough: YES-productive of yellow sputum  Wheeze: No  Decreased Appetite: No  Nausea: YES  Vomiting: YES  Diarrhea: No  Dysuria/Freq.: No  Dysuria or Hematuria: No  Fatigue/Achiness: YES  Sick/Strep Exposure: No  Therapies tried and outcome: Tylenol        Review of Systems  Constitutional, HEENT, cardiovascular, pulmonary, GI, , musculoskeletal, neuro, skin, endocrine and psych systems are negative, except as otherwise noted.      Objective    /74   Pulse 78   Temp (!) 102.3  F (39.1  C) (Tympanic)   Resp 20   Wt 67.6 kg (149 lb)   LMP  (LMP Unknown)   SpO2 94%   BMI 27.25 kg/m    Body mass index is 27.25 kg/m .  Physical Exam   GENERAL: alert and no distress  EYES: Eyes grossly normal to inspection, PERRL and conjunctivae and sclerae normal  HENT: ear canals and TM's normal, nose and mouth without ulcers or lesions  NECK: no adenopathy, no asymmetry, masses, or scars  RESP: lungs clear to auscultation - no rales, rhonchi or wheezes  CV: regular rate and rhythm, normal S1 S2, no S3 or S4, no murmur, click or rub, no peripheral edema  ABDOMEN: soft, nontender, no hepatosplenomegaly, no masses and bowel sounds normal  MS: no gross  musculoskeletal defects noted, no edema  SKIN: no suspicious lesions or rashes            Signed Electronically by: Akhil Almodovar MD

## 2024-11-14 NOTE — NURSING NOTE
Clinic Administered Medication Documentation    I have rechecked temperature, fever reduced, injection given.     Patient was given CEFTRIAXONE. Prior to medication administration, verified patient's identity using patient s name and date of birth. Please see MAR and medication order for additional information. Patient instructed to remain in clinic for 15 minutes and report any adverse reaction to staff immediately.    Vial/Syringe: Single dose vial. Was entire vial of medication used? Yes     Sanaz Wallace MA on 11/14/2024 at 3:02 PM

## 2024-11-14 NOTE — NURSING NOTE
Clinic Administered Medication Documentation        Patient was given Acetaminophen. Prior to medication administration, verified patient's identity using patient s name and date of birth. Please see MAR and medication order for additional information. Patient instructed to remain in clinic for 15 minutes and report any adverse reaction to staff immediately.    Oral medication was given, three doses of 325mg of Tylenol.    Sanaz Wallace MA on 11/14/2024 at 2:38 PM

## 2024-11-15 LAB — SARS-COV-2 RNA RESP QL NAA+PROBE: NEGATIVE

## 2024-12-01 ENCOUNTER — TELEPHONE (OUTPATIENT)
Dept: FAMILY MEDICINE | Facility: CLINIC | Age: 69
End: 2024-12-01
Payer: MEDICARE

## 2024-12-01 DIAGNOSIS — I10 BENIGN ESSENTIAL HYPERTENSION: ICD-10-CM

## 2024-12-01 DIAGNOSIS — E03.9 HYPOTHYROIDISM, UNSPECIFIED TYPE: ICD-10-CM

## 2024-12-01 NOTE — LETTER
December 5, 2024      Idalia Howe  [address] [place] [address]        Hi Idalia,     Our records indicate that it is time to schedule a visit with the lab.  You are due to be seen for a lab visit.   You may call [phone] to schedule or via [facility] using the appointment tab.  Schedules fill quickly, so we recommend scheduling at this time.         Thank you         [facility]

## 2024-12-02 DIAGNOSIS — K29.00 ACUTE GASTRITIS WITHOUT HEMORRHAGE, UNSPECIFIED GASTRITIS TYPE: ICD-10-CM

## 2024-12-02 RX ORDER — SUCRALFATE 1 G/1
TABLET ORAL
Qty: 20 TABLET | Refills: 1 | Status: SHIPPED | OUTPATIENT
Start: 2024-12-02

## 2024-12-02 RX ORDER — LEVOTHYROXINE SODIUM 50 UG/1
50 TABLET ORAL DAILY
Qty: 90 TABLET | Refills: 0 | Status: SHIPPED | OUTPATIENT
Start: 2024-12-02

## 2024-12-02 RX ORDER — CHLORTHALIDONE 25 MG/1
12.5 TABLET ORAL DAILY
Qty: 45 TABLET | Refills: 0 | Status: SHIPPED | OUTPATIENT
Start: 2024-12-02

## 2024-12-02 NOTE — TELEPHONE ENCOUNTER
Called pt, LVM requesting callback to schedule lab appointment. 1st attempt.     Tamera Kinsey RN on 12/2/2024 at 3:11 PM

## 2024-12-09 SDOH — HEALTH STABILITY: PHYSICAL HEALTH: ON AVERAGE, HOW MANY DAYS PER WEEK DO YOU ENGAGE IN MODERATE TO STRENUOUS EXERCISE (LIKE A BRISK WALK)?: 2 DAYS

## 2024-12-09 SDOH — HEALTH STABILITY: PHYSICAL HEALTH: ON AVERAGE, HOW MANY MINUTES DO YOU ENGAGE IN EXERCISE AT THIS LEVEL?: 30 MIN

## 2024-12-09 ASSESSMENT — SOCIAL DETERMINANTS OF HEALTH (SDOH): HOW OFTEN DO YOU GET TOGETHER WITH FRIENDS OR RELATIVES?: TWICE A WEEK

## 2024-12-12 ENCOUNTER — OFFICE VISIT (OUTPATIENT)
Dept: FAMILY MEDICINE | Facility: CLINIC | Age: 69
End: 2024-12-12
Attending: FAMILY MEDICINE
Payer: COMMERCIAL

## 2024-12-12 VITALS
SYSTOLIC BLOOD PRESSURE: 136 MMHG | RESPIRATION RATE: 16 BRPM | TEMPERATURE: 97.8 F | WEIGHT: 147 LBS | HEART RATE: 55 BPM | BODY MASS INDEX: 27.75 KG/M2 | DIASTOLIC BLOOD PRESSURE: 82 MMHG | OXYGEN SATURATION: 98 % | HEIGHT: 61 IN

## 2024-12-12 DIAGNOSIS — Z00.00 ENCOUNTER FOR MEDICARE ANNUAL WELLNESS EXAM: Primary | ICD-10-CM

## 2024-12-12 DIAGNOSIS — I10 BENIGN ESSENTIAL HYPERTENSION: ICD-10-CM

## 2024-12-12 DIAGNOSIS — J20.9 ACUTE BRONCHITIS WITH SYMPTOMS > 10 DAYS: ICD-10-CM

## 2024-12-12 DIAGNOSIS — K52.9 CHRONIC DIARRHEA: ICD-10-CM

## 2024-12-12 DIAGNOSIS — R05.1 ACUTE COUGH: ICD-10-CM

## 2024-12-12 DIAGNOSIS — E03.9 HYPOTHYROIDISM, UNSPECIFIED TYPE: ICD-10-CM

## 2024-12-12 DIAGNOSIS — J32.9 PURULENT POSTNASAL DRAINAGE: ICD-10-CM

## 2024-12-12 DIAGNOSIS — E78.5 HYPERLIPIDEMIA LDL GOAL <160: ICD-10-CM

## 2024-12-12 LAB
ERYTHROCYTE [DISTWIDTH] IN BLOOD BY AUTOMATED COUNT: 13.8 % (ref 10–15)
HCT VFR BLD AUTO: 42.9 % (ref 35–47)
HGB BLD-MCNC: 14.2 G/DL (ref 11.7–15.7)
MCH RBC QN AUTO: 29.5 PG (ref 26.5–33)
MCHC RBC AUTO-ENTMCNC: 33.1 G/DL (ref 31.5–36.5)
MCV RBC AUTO: 89 FL (ref 78–100)
PLATELET # BLD AUTO: 345 10E3/UL (ref 150–450)
RBC # BLD AUTO: 4.81 10E6/UL (ref 3.8–5.2)
WBC # BLD AUTO: 6.9 10E3/UL (ref 4–11)

## 2024-12-12 RX ORDER — CODEINE PHOSPHATE AND GUAIFENESIN 10; 100 MG/5ML; MG/5ML
1 SOLUTION ORAL EVERY 6 HOURS PRN
Qty: 250 ML | Refills: 2 | Status: SHIPPED | OUTPATIENT
Start: 2024-12-12

## 2024-12-12 RX ORDER — CHOLESTYRAMINE 4 G/9G
1 POWDER, FOR SUSPENSION ORAL 2 TIMES DAILY WITH MEALS
Qty: 60 EACH | Refills: 2 | Status: SHIPPED | OUTPATIENT
Start: 2024-12-12 | End: 2024-12-12

## 2024-12-12 RX ORDER — CHOLESTYRAMINE 4 G/9G
1 POWDER, FOR SUSPENSION ORAL 2 TIMES DAILY WITH MEALS
Qty: 180 PACKET | Refills: 1 | Status: SHIPPED | OUTPATIENT
Start: 2024-12-12

## 2024-12-12 ASSESSMENT — PAIN SCALES - GENERAL: PAINLEVEL_OUTOF10: NO PAIN (0)

## 2024-12-12 NOTE — PATIENT INSTRUCTIONS
Patient Education   Preventive Care Advice   This is general advice given by our system to help you stay healthy. However, your care team may have specific advice just for you. Please talk to your care team about your preventive care needs.  Nutrition  Eat 5 or more servings of fruits and vegetables each day.  Try wheat bread, brown rice and whole grain pasta (instead of white bread, rice, and pasta).  Get enough calcium and vitamin D. Check the label on foods and aim for 100% of the RDA (recommended daily allowance).  Lifestyle  Exercise at least 150 minutes each week  (30 minutes a day, 5 days a week).  Do muscle strengthening activities 2 days a week. These help control your weight and prevent disease.  No smoking.  Wear sunscreen to prevent skin cancer.  Have a dental exam and cleaning every 6 months.  Yearly exams  See your health care team every year to talk about:  Any changes in your health.  Any medicines your care team has prescribed.  Preventive care, family planning, and ways to prevent chronic diseases.  Shots (vaccines)   HPV shots (up to age 26), if you've never had them before.  Hepatitis B shots (up to age 59), if you've never had them before.  COVID-19 shot: Get this shot when it's due.  Flu shot: Get a flu shot every year.  Tetanus shot: Get a tetanus shot every 10 years.  Pneumococcal, hepatitis A, and RSV shots: Ask your care team if you need these based on your risk.  Shingles shot (for age 50 and up)  General health tests  Diabetes screening:  Starting at age 35, Get screened for diabetes at least every 3 years.  If you are younger than age 35, ask your care team if you should be screened for diabetes.  Cholesterol test: At age 39, start having a cholesterol test every 5 years, or more often if advised.  Bone density scan (DEXA): At age 50, ask your care team if you should have this scan for osteoporosis (brittle bones).  Hepatitis C: Get tested at least once in your life.  STIs (sexually  transmitted infections)  Before age 24: Ask your care team if you should be screened for STIs.  After age 24: Get screened for STIs if you're at risk. You are at risk for STIs (including HIV) if:  You are sexually active with more than one person.  You don't use condoms every time.  You or a partner was diagnosed with a sexually transmitted infection.  If you are at risk for HIV, ask about PrEP medicine to prevent HIV.  Get tested for HIV at least once in your life, whether you are at risk for HIV or not.  Cancer screening tests  Cervical cancer screening: If you have a cervix, begin getting regular cervical cancer screening tests starting at age 21.  Breast cancer scan (mammogram): If you've ever had breasts, begin having regular mammograms starting at age 40. This is a scan to check for breast cancer.  Colon cancer screening: It is important to start screening for colon cancer at age 45.  Have a colonoscopy test every 10 years (or more often if you're at risk) Or, ask your provider about stool tests like a FIT test every year or Cologuard test every 3 years.  To learn more about your testing options, visit:   .  For help making a decision, visit:   https://bit.ly/lv49569.  Prostate cancer screening test: If you have a prostate, ask your care team if a prostate cancer screening test (PSA) at age 55 is right for you.  Lung cancer screening: If you are a current or former smoker ages 50 to 80, ask your care team if ongoing lung cancer screenings are right for you.  For informational purposes only. Not to replace the advice of your health care provider. Copyright   2023 [facility]. All rights reserved. Clinically reviewed by the [facility] Transitions Program. [employer] 436435 - REV 01/24.  Preventing Falls: Care Instructions  Injuries and health problems such as trouble walking or poor eyesight can increase your risk of falling. So can some medicines. But there are things you can do to help  "prevent falls. You can exercise to get stronger. You can also arrange your home to make it safer.    Talk to your doctor about the medicines you take. Ask if any of them increase the risk of falls and whether they can be changed or stopped.   Try to exercise regularly. It can help improve your strength and balance. This can help lower your risk of falling.         Practice fall safety and prevention.   Wear low-heeled shoes that fit well and give your feet good support. Talk to your doctor if you have foot problems that make this hard.  Carry a cellphone or wear a medical alert device that you can use to call for help.  Use stepladders instead of chairs to reach high objects. Don't climb if you're at risk for falls. Ask for help, if needed.  Wear the correct eyeglasses, if you need them.        Make your home safer.   Remove rugs, cords, clutter, and furniture from walkways.  Keep your house well lit. Use night-lights in hallways and bathrooms.  Install and use sturdy handrails on stairways.  Wear nonskid footwear, even inside. Don't walk barefoot or in socks without shoes.        Be safe outside.   Use handrails, curb cuts, and ramps whenever possible.  Keep your hands free by using a shoulder bag or backpack.  Try to walk in well-lit areas. Watch out for uneven ground, changes in pavement, and debris.  Be careful in the winter. Walk on the grass or gravel when sidewalks are slippery. Use de-icer on steps and walkways. Add non-slip devices to shoes.    Put grab bars and nonskid mats in your shower or tub and near the toilet. Try to use a shower chair or bath bench when bathing.   Get into a tub or shower by putting in your weaker leg first. Get out with your strong side first. Have a phone or medical alert device in the bathroom with you.   Where can you learn more?  Go to https://www.[employer].net/patiented  Enter G117 in the search box to learn more about \"Preventing Falls: Care Instructions.\"  Current as of: " July 17, 2023  Content Version: 14.2 2024 [facility].   Care instructions adapted under license by your healthcare professional. If you have questions about a medical condition or this instruction, always ask your healthcare professional. [employer] disclaims any warranty or liability for your use of this information.    Learning About Sleeping Well  What does sleeping well mean?     Sleeping well means getting enough sleep to feel good and stay healthy. How much sleep is enough varies among people.  The number of hours you sleep and how you feel when you wake up are both important. If you do not feel refreshed, you probably need more sleep. Another sign of not getting enough sleep is feeling tired during the day.  Experts recommend that adults get at least 7 or more hours of sleep per day. Children and older adults need more sleep.  Why is getting enough sleep important?  Getting enough quality sleep is a basic part of good health. When your sleep suffers, your physical health, mood, and your thoughts can suffer too. You may find yourself feeling more grumpy or stressed. Not getting enough sleep also can lead to serious problems, including injury, accidents, anxiety, and depression.  What might cause poor sleeping?  Many things can cause sleep problems, including:  Changes to your sleep schedule.  Stress. Stress can be caused by fear about a single event, such as giving a speech. Or you may have ongoing stress, such as worry about work or school.  Depression, anxiety, and other mental or emotional conditions.  Changes in your sleep habits or surroundings. This includes changes that happen where you sleep, such as noise, light, or sleeping in a different bed. It also includes changes in your sleep pattern, such as having jet lag or working a late shift.  Health problems, such as pain, breathing problems, and restless legs syndrome.  Lack of regular exercise.  Using alcohol, nicotine, or  "caffeine before bed.  How can you help yourself?  Here are some tips that may help you sleep more soundly and wake up feeling more refreshed.  Your sleeping area   Use your bedroom only for sleeping and sex. A bit of light reading may help you fall asleep. But if it doesn't, do your reading elsewhere in the house. Try not to use your TV, computer, smartphone, or tablet while you are in bed.  Be sure your bed is big enough to stretch out comfortably, especially if you have a sleep partner.  Keep your bedroom quiet, dark, and cool. Use curtains, blinds, or a sleep mask to block out light. To block out noise, use earplugs, soothing music, or a \"white noise\" machine.  Your evening and bedtime routine   Create a relaxing bedtime routine. You might want to take a warm shower or bath, or listen to soothing music.  Go to bed at the same time every night. And get up at the same time every morning, even if you feel tired.  What to avoid   Limit caffeine (coffee, tea, caffeinated sodas) during the day, and don't have any for at least 6 hours before bedtime.  Avoid drinking alcohol before bedtime. Alcohol can cause you to wake up more often during the night.  Try not to smoke or use tobacco, especially in the evening. Nicotine can keep you awake.  Limit naps during the day, especially close to bedtime.  Avoid lying in bed awake for too long. If you can't fall asleep or if you wake up in the middle of the night and can't get back to sleep within about 20 minutes, get out of bed and go to another room until you feel sleepy.  Avoid taking medicine right before bed that may keep you awake or make you feel hyper or energized. Your doctor can tell you if your medicine may do this and if you can take it earlier in the day.  If you can't sleep   Imagine yourself in a peaceful, pleasant scene. Focus on the details and feelings of being in a place that is relaxing.  Get up and do a quiet or boring activity until you feel sleepy.  Avoid " "drinking any liquids before going to bed to help prevent waking up often to use the bathroom.  Where can you learn more?  Go to https://www.[employer].net/patiented  Enter J942 in the search box to learn more about \"Learning About Sleeping Well.\"  Current as of: July 10, 2023  Content Version: 14.2 2024 [employer].   Care instructions adapted under license by your healthcare professional. If you have questions about a medical condition or this instruction, always ask your healthcare professional. [employer] disclaims any warranty or liability for your use of this information.    Bladder Training: Care Instructions  Your Care Instructions     Bladder training is used to treat urge incontinence and stress incontinence. Urge incontinence means that the need to urinate comes on so fast that you can't get to a toilet in time. Stress incontinence means that you leak urine because of pressure on your bladder. For example, it may happen when you laugh, cough, or lift something heavy.  Bladder training can increase how long you can wait before you have to urinate. It can also help your bladder hold more urine. And it can give you better control over the urge to urinate.  It is important to remember that bladder training takes a few weeks to a few months to make a difference. You may not see results right away, but don't give up.  Follow-up care is a key part of your treatment and safety. Be sure to make and go to all appointments, and call your doctor if you are having problems. It's also a good idea to know your test results and keep a list of the medicines you take.  How can you care for yourself at home?  Work with your doctor to come up with a bladder training program that is right for you. You may use one or more of the following methods.  Delayed urination  In the beginning, try to keep from urinating for 5 minutes after you first feel the need to go.  While you wait, take deep, slow " "breaths to relax. Kegel exercises can also help you delay the need to go to the bathroom.  After some practice, when you can easily wait 5 minutes to urinate, try to wait 10 minutes before you urinate.  Slowly increase the waiting period until you are able to control when you have to urinate.  Scheduled urination  Empty your bladder when you first wake up in the morning.  Schedule times throughout the day when you will urinate.  Start by going to the bathroom every hour, even if you don't need to go.  Slowly increase the time between trips to the bathroom.  When you have found a schedule that works well for you, keep doing it.  If you wake up during the night and have to urinate, do it. Apply your schedule to waking hours only.  Kegel exercises  These tighten and strengthen pelvic muscles, which can help you control the flow of urine. (If doing these exercises causes pain, stop doing them and talk with your doctor.) To do Kegel exercises:  Squeeze your muscles as if you were trying not to pass gas. Or squeeze your muscles as if you were stopping the flow of urine. Your belly, legs, and buttocks shouldn't move.  Hold the squeeze for 3 seconds, then relax for 5 to 10 seconds.  Start with 3 seconds, then add 1 second each week until you are able to squeeze for 10 seconds.  Repeat the exercise 10 times a session. Do 3 to 8 sessions a day.  When should you call for help?  Watch closely for changes in your health, and be sure to contact your doctor if:    Your incontinence is getting worse.     You do not get better as expected.   Where can you learn more?  Go to https://www.[employer]Miiix.net/patiented  Enter V684 in the search box to learn more about \"Bladder Training: Care Instructions.\"  Current as of: November 15, 2023  Content Version: 14.2 2024 [employer].   Care instructions adapted under license by your healthcare professional. If you have questions about a medical condition or this instruction, always " ask your healthcare professional. [employer] disclaims any warranty or liability for your use of this information.

## 2024-12-12 NOTE — PROGRESS NOTES
Preventive Care Visit  [facility] LESLIE Almodovar MD, Family Medicine  Dec 12, 2024      Assessment & Plan     Hyperlipidemia LDL goal <160    - Lipid panel reflex to direct LDL Non-fasting; Future  - Lipid panel reflex to direct LDL Non-fasting    Encounter for Medicare annual wellness exam    - Lipid panel reflex to direct LDL Non-fasting; Future  - CBC with platelets; Future  - Comprehensive metabolic panel (BMP + Alb, Alk Phos, ALT, AST, Total. Bili, TP); Future  - TSH with free T4 reflex; Future  - Lipid panel reflex to direct LDL Non-fasting  - CBC with platelets  - Comprehensive metabolic panel (BMP + Alb, Alk Phos, ALT, AST, Total. Bili, TP)    Benign essential hypertension  Has been stable   - Lipid panel reflex to direct LDL Non-fasting; Future  - Comprehensive metabolic panel (BMP + Alb, Alk Phos, ALT, AST, Total. Bili, TP); Future  - Lipid panel reflex to direct LDL Non-fasting  - Comprehensive metabolic panel (BMP + Alb, Alk Phos, ALT, AST, Total. Bili, TP)    Hypothyroidism, unspecified type  Has no clinical sx of worsening   - Comprehensive metabolic panel (BMP + Alb, Alk Phos, ALT, AST, Total. Bili, TP); Future  - TSH with free T4 reflex; Future  - TSH with free T4 reflex  - Comprehensive metabolic panel (BMP + Alb, Alk Phos, ALT, AST, Total. Bili, TP)    Acute cough  Has no improvement, has no sign of acute infection   Will have her to try cough meds, will also refer her to ENT for further evaluation on postnasal drainage,   - guaiFENesin-codeine (ROBITUSSIN AC) 100-10 MG/5ML solution; Take 5 mLs by mouth every 6 hours as needed for cough.    Acute bronchitis with symptoms > 10 days    - guaiFENesin-codeine (ROBITUSSIN AC) 100-10 MG/5ML solution; Take 5 mLs by mouth every 6 hours as needed for cough.    Chronic diarrhea  Has been having diarrhea after meal within 1 hour, will have her to try cholestyramine and follow up in 1 month   - cholestyramine (QUESTRAN) 4 g packet;  "Take 1 packet (4 g) by mouth 2 times daily (with meals).    Purulent postnasal drainage  Will have her to check on with ENT and consider referral to allergy if not improving   - Adult ENT  Referral; Future    Patient has been advised of split billing requirements and indicates understanding: Yes        BMI  Estimated body mass index is 27.4 kg/m  as calculated from the following:    Height as of this encounter: 1.56 m (5' 1.42\").    Weight as of this encounter: 66.7 kg (147 lb).   Weight management plan: Discussed healthy diet and exercise guidelines    Counseling  Appropriate preventive services were addressed with this patient via screening, questionnaire, or discussion as appropriate for fall prevention, nutrition, physical activity, Tobacco-use cessation, social engagement, weight loss and cognition.  Checklist reviewing preventive services available has been given to the patient.  Reviewed patient's diet, addressing concerns and/or questions.   She is at risk for lack of exercise and has been provided with information to increase physical activity for the benefit of her well-being.   The patient was instructed to see the dentist every 6 months.   Discussed possible causes of fatigue. Information on urinary incontinence and treatment options given to patient.       FUTURE APPOINTMENTS:       - Follow-up visit mentioned above     Syeda Friedman is a 68 year old, presenting for the following:  Wellness Visit        12/12/2024     2:03 PM   Additional Questions   Roomed by Jose   Accompanied by Son           HPI    Health Care Directive  Patient does not have a Health Care Directive: Discussed advance care planning with patient; however, patient declined at this time.      12/9/2024   General Health   How would you rate your overall physical health? (!) FAIR   Feel stress (tense, anxious, or unable to sleep) Not at all            12/9/2024   Nutrition   Diet: Regular (no restrictions)            " 12/9/2024   Exercise   Days per week of moderate/strenous exercise 2 days   Average minutes spent exercising at this level 30 min      (!) EXERCISE CONCERN      12/9/2024   Social Factors   Frequency of gathering with friends or relatives Twice a week   Worry food won't last until get money to buy more No   Food not last or not have enough money for food? No   Do you have housing? (Housing is defined as stable permanent housing and does not include staying ouside in a car, in a tent, in an abandoned building, in an overnight shelter, or couch-surfing.) Yes   Are you worried about losing your housing? No   Lack of transportation? No   Unable to get utilities (heat,electricity)? No            12/12/2024   Fall Risk   Gait Speed Test (Document in seconds) 4.22   Gait Speed Test Interpretation Less than or equal to 5.00 seconds - PASS             12/9/2024   Activities of Daily Living- Home Safety   Needs help with the following daily activites None of the above   Safety concerns in the home None of the above            12/9/2024   Dental   Dentist two times every year? (!) NO            12/9/2024   Hearing Screening   Hearing concerns? None of the above            12/9/2024   Driving Risk Screening   Patient/family members have concerns about driving No            12/9/2024   General Alertness/Fatigue Screening   Have you been more tired than usual lately? (!) YES            12/9/2024   Urinary Incontinence Screening   Bothered by leaking urine in past 6 months Yes            12/9/2024   TB Screening   Were you born outside of the [place]? Yes            Today's PHQ-2 Score:       12/12/2024     1:42 PM   PHQ-2 ( 1999 [employer])   Q1: Little interest or pleasure in doing things 2    Q2: Feeling down, depressed or hopeless 0    PHQ-2 Score 2    Q1: Little interest or pleasure in doing things More than half the days   Q2: Feeling down, depressed or hopeless Not at all   PHQ-2 Score 2       Patient-reported           12/9/2024    Substance Use   Alcohol more than 3/day or more than 7/wk No   Do you have a current opioid prescription? No   How severe/bad is pain from 1 to 10? 6/10   Do you use any other substances recreationally? No        Social History     Tobacco Use    Smoking status: Never    Smokeless tobacco: Never   Vaping Use    Vaping status: Never Used   Substance Use Topics    Alcohol use: Not Currently     Comment: once-twice a month     Drug use: No           4/8/2024   LAST FHS-7 RESULTS   1st degree relative breast or ovarian cancer No   Any relative bilateral breast cancer No   Any male have breast cancer No   Any ONE woman have BOTH breast AND ovarian cancer No   Any woman with breast cancer before 50yrs No   2 or more relatives with breast AND/OR ovarian cancer No   2 or more relatives with breast AND/OR bowel cancer No           Mammogram Screening - Mammogram every 1-2 years updated in Health Maintenance based on mutual decision making      History of abnormal Pap smear: No - age 65 or older with adequate negative prior screening test results (3 consecutive negative cytology results, 2 consecutive negative cotesting results, or 2 consecutive negative HrHPV test results within 10 years, with the most recent test occurring within the recommended screening interval for the test used)        Latest Ref Rng & Units 4/1/2024    12:43 PM   PAP / HPV   PAP  Negative for Intraepithelial Lesion or Malignancy (NILM)    HPV 16 DNA Negative Negative    HPV 18 DNA Negative Negative    Other HR HPV Negative Negative      ASCVD Risk   The 10-year ASCVD risk score (Rama DEXTER, et al., 2019) is: 13.5%    Values used to calculate the score:      Age: 68 years      Sex: Female      Is Non- : No      Diabetic: No      Tobacco smoker: No      Systolic Blood Pressure: 136 mmHg      Is BP treated: Yes      HDL Cholesterol: 72 mg/dL      Total Cholesterol: 201 mg/dL            Reviewed and updated as needed this  visit by Provider                    Past Medical History:   Diagnosis Date    Back pain     Cancer (H)     HAD THYROID CANCER    COPD (chronic obstructive pulmonary disease) (H)     Hip pain     Hypertension     Neuropathic pain     Thyroid disease     TAKING MEDICATION    Uncomplicated asthma      Past Surgical History:   Procedure Laterality Date    APPENDECTOMY      COLONOSCOPY N/A 01/11/2024    Procedure: Colonoscopy;  Surgeon: Rachel Berger MD;  Location:  GI    ESOPHAGOSCOPY, GASTROSCOPY, DUODENOSCOPY (EGD), COMBINED N/A 07/05/2023    Procedure: Esophagoscopy, gastroscopy, duodenoscopy (EGD), combined;  Surgeon: David Garcia MD;  Location:  GI    left salpinectomy Left 2007    left fallopian tube d/t abnormal US findings    THYROIDECTOMY   2004    thyroid cancer    TUBAL LIGATION       Labs reviewed in EPIC  BP Readings from Last 3 Encounters:   12/12/24 136/82   11/14/24 119/74   06/03/24 138/86    Wt Readings from Last 3 Encounters:   12/12/24 66.7 kg (147 lb)   11/14/24 67.6 kg (149 lb)   06/03/24 70.3 kg (155 lb)                  Patient Active Problem List   Diagnosis    Hyperlipidemia LDL goal <160    Lumbar spondylosis    Lumbar disc disease with radiculopathy    DJD (degenerative joint disease) of cervical spine    Malignant neoplasm of thyroid gland (H)    Nonspecific reaction to tuberculin test    Cyst of ovary    History of malignant neoplasm of thyroid    Postoperative hypothyroidism    Acute bronchitis    Chronic bronchitis (H)    Chronic rhinitis    Mixed incontinence    Urgency-frequency syndrome    Hypothyroidism, unspecified type    Myalgia of pelvic floor    Pelvic floor dysfunction    Fecal smearing    Personal history of urinary tract infection     Past Surgical History:   Procedure Laterality Date    APPENDECTOMY      COLONOSCOPY N/A 01/11/2024    Procedure: Colonoscopy;  Surgeon: Rachel Berger MD;  Location:  GI    ESOPHAGOSCOPY, GASTROSCOPY, DUODENOSCOPY (EGD),  COMBINED N/A 07/05/2023    Procedure: Esophagoscopy, gastroscopy, duodenoscopy (EGD), combined;  Surgeon: David Garcia MD;  Location: [facility]    left salpinectomy Left 2007    left fallopian tube d/t abnormal US findings    THYROIDECTOMY   2004    thyroid cancer    TUBAL LIGATION         Social History     Tobacco Use    Smoking status: Never    Smokeless tobacco: Never   Substance Use Topics    Alcohol use: Not Currently     Comment: once-twice a month      Family History   Problem Relation Age of Onset    Diabetes Mother     Hypertension Mother     Hyperlipidemia Mother     Colon Cancer Paternal Grandmother          Current Outpatient Medications   Medication Sig Dispense Refill    Acetaminophen (TYLENOL PO) Take 325 mg by mouth every 4 hours as needed for mild pain or fever      albuterol (PROAIR RESPICLICK) 108 (90 Base) MCG/ACT inhaler INHALE 1 PUFF INTO THE LUNGS EVERY 6 HOURS AS NEEDED FOR SHORTNESS OF BREATH OR DIFFICULT BREATHING OR WHEEZING Strength: 108 (90 Base) MCG/ACT 1 each 3    Ascorbic Acid (VITAMIN C) 500 MG CAPS       Calcium Citrate-Vitamin D (CALCIUM + D PO) Take by mouth daily      cetirizine (ZYRTEC) 10 MG tablet TAKE 1 TO 2 TABLETS(10 TO 20 MG) BY MOUTH DAILY 180 tablet 1    chlorthalidone (HYGROTON) 25 MG tablet TAKE 1/2 TABLET(12.5 MG) BY MOUTH DAILY 45 tablet 0    cholestyramine (QUESTRAN) 4 g packet Take 1 packet (4 g) by mouth 2 times daily (with meals). 60 each 2    conjugated estrogens (PREMARIN) 0.625 MG/GM vaginal cream Place 1 g vaginally twice a week 30 g 1    eszopiclone (LUNESTA) 2 MG tablet Take 1 tablet (2 mg) by mouth at bedtime 30 tablet 0    fluticasone (FLONASE) 50 MCG/ACT nasal spray Spray 1 spray into both nostrils At Bedtime 16 mL 3    gabapentin (NEURONTIN) 100 MG capsule Take 1 capsule (100 mg) by mouth 2 times daily 180 capsule 1    guaiFENesin-codeine (ROBITUSSIN AC) 100-10 MG/5ML solution Take 5 mLs by mouth every 6 hours as needed for cough. 250 mL 2     levothyroxine (SYNTHROID/LEVOTHROID) 50 MCG tablet TAKE 1 TABLET(50 MCG) BY MOUTH DAILY 90 tablet 0    losartan (COZAAR) 100 MG tablet Take 1 tablet (100 mg) by mouth daily 90 tablet 3    montelukast (SINGULAIR) 10 MG tablet TAKE 1 TABLET(10 MG) BY MOUTH AT BEDTIME 90 tablet 1    Multiple Vitamins-Minerals (PRESERVISION AREDS PO) Take by mouth daily      ondansetron (ZOFRAN) 4 MG tablet Take 1 tablet (4 mg) by mouth every 12 hours as needed for nausea. 20 tablet 0    simvastatin (ZOCOR) 40 MG tablet Take 1 tablet (40 mg) by mouth at bedtime 90 tablet 1    sucralfate (CARAFATE) 1 GM tablet TAKE 1 TABLET(1 GRAM) BY MOUTH TWICE DAILY 20 tablet 1    TRELEGY ELLIPTA 200-62.5-25 MCG/ACT oral inhaler INHALE 1 PUFF BY MOUTH AT THE SAME TIME EVERY DAY      trospium (SANCTURA) 20 MG tablet Take 1 tablet (20 mg) by mouth 2 times daily (before meals) 180 tablet 1    vitamin B complex with vitamin C (VITAMIN  B COMPLEX) tablet Take 1 tablet by mouth daily      Vitamin D, Cholecalciferol, 25 MCG (1000 UT) CAPS        Allergies   Allergen Reactions    Sulfa Antibiotics Nausea     Recent Labs   Lab Test 11/14/24  1511 06/03/24  1230 12/06/23  1346 03/23/23  1135 01/10/22  1339 01/10/22  1339 08/17/20  1139 02/03/20  1209 07/22/19  1230   A1C  --   --   --   --   --   --   --   --  5.7*   LDL  --  76 100 104*  --  122*   < > 102*  --    HDL  --   --  72 61  --  64  --  58  --    TRIG  --   --  147 144  --  213*  --  227* 159*   ALT 22  --  33 22  --  25   < > 23 36   CR  --  0.76 0.56 0.63  --  0.63   < > 0.64 0.63   GFRESTIMATED  --  84 >90 >90  --  >90   < > >90 >90   GFRESTBLACK  --   --   --   --   --   --   --  >90 >90   POTASSIUM  --  3.7 3.7 4.2   < > 3.4  --  3.6 3.5   TSH  --  0.13* 0.02* 0.01*   < > 25.57*   < > <0.01*  --     < > = values in this interval not displayed.      Current providers sharing in care for this patient include:  Patient Care Team:  Akhil Almodovar MD as PCP - General (Family Practice)  Akhil Almodovar MD  "as Assigned PCP  Obdulia Barragan PA-C as Physician Assistant  Melody Antonio PA-C as Physician Assistant (OB/Gyn)  Obdulia Barragan PA-C as Assigned Surgical Provider  Melody Antonio PA-C as Assigned OBGYN Provider    The following health maintenance items are reviewed in Epic and correct as of today:  Health Maintenance   Topic Date Due    COPD ACTION PLAN  Never done    ANNUAL REVIEW OF HM ORDERS  08/24/2024    COVID-19 Vaccine (5 - 2024-25 season) 09/01/2024    LIPID  12/06/2024    MEDICARE ANNUAL WELLNESS VISIT  04/01/2025    BMP  06/03/2025    TSH W/FREE T4 REFLEX  06/03/2025    FALL RISK ASSESSMENT  12/12/2025    MAMMO SCREENING  04/08/2026    EGD  07/05/2026    GLUCOSE  06/03/2027    ADVANCE CARE PLANNING  12/06/2028    COLORECTAL CANCER SCREENING  01/11/2029    DTAP/TDAP/TD IMMUNIZATION (3 - Td or Tdap) 01/02/2034    DEXA  02/04/2034    PHQ-2 (once per calendar year)  Completed    INFLUENZA VACCINE  Completed    Pneumococcal Vaccine: 65+ Years  Completed    ZOSTER IMMUNIZATION  Completed    RSV VACCINE  Completed    HPV IMMUNIZATION  Aged Out    MENINGITIS IMMUNIZATION  Aged Out    RSV MONOCLONAL ANTIBODY  Aged Out    SPIROMETRY  Discontinued    HEPATITIS C SCREENING  Discontinued         Review of Systems  Constitutional, HEENT, cardiovascular, pulmonary, GI, , musculoskeletal, neuro, skin, endocrine and psych systems are negative, except as otherwise noted.     Objective    Exam  /82   Pulse 55   Temp 97.8  F (36.6  C) (Temporal)   Resp 16   Ht 1.56 m (5' 1.42\")   Wt 66.7 kg (147 lb)   LMP  (LMP Unknown)   SpO2 98%   BMI 27.40 kg/m     Estimated body mass index is 27.4 kg/m  as calculated from the following:    Height as of this encounter: 1.56 m (5' 1.42\").    Weight as of this encounter: 66.7 kg (147 lb).    Physical Exam  GENERAL: alert and no distress  EYES: Eyes grossly normal to inspection, PERRL and conjunctivae and sclerae normal  HENT: ear canals and TM's normal, " nose and mouth without ulcers or lesions  NECK: no adenopathy, no asymmetry, masses, or scars  RESP: lungs clear to auscultation - no rales, rhonchi or wheezes  CV: regular rate and rhythm, normal S1 S2, no S3 or S4, no murmur, click or rub, no peripheral edema  ABDOMEN: soft, nontender, no hepatosplenomegaly, no masses and bowel sounds normal  MS: no gross musculoskeletal defects noted, no edema  SKIN: no suspicious lesions or rashes  NEURO: Normal strength and tone, mentation intact and speech normal  BACK: no CVA tenderness, no paralumbar tenderness  PSYCH: mentation appears normal, affect normal/bright        12/12/2024   Mini Cog   Clock Draw Score 2 Normal   3 Item Recall 3 objects recalled   Mini Cog Total Score 5                 Signed Electronically by: Akhil Almodovar MD

## 2024-12-21 DIAGNOSIS — K29.00 ACUTE GASTRITIS WITHOUT HEMORRHAGE, UNSPECIFIED GASTRITIS TYPE: ICD-10-CM

## 2024-12-22 DIAGNOSIS — E78.5 HYPERLIPIDEMIA LDL GOAL <160: ICD-10-CM

## 2024-12-23 RX ORDER — SUCRALFATE 1 G/1
TABLET ORAL
Qty: 20 TABLET | Refills: 0 | Status: SHIPPED | OUTPATIENT
Start: 2024-12-23

## 2024-12-23 RX ORDER — SIMVASTATIN 40 MG
40 TABLET ORAL AT BEDTIME
Qty: 90 TABLET | Refills: 2 | Status: SHIPPED | OUTPATIENT
Start: 2024-12-23

## 2025-03-01 DIAGNOSIS — I10 BENIGN ESSENTIAL HYPERTENSION: ICD-10-CM

## 2025-03-01 DIAGNOSIS — E03.9 HYPOTHYROIDISM, UNSPECIFIED TYPE: ICD-10-CM

## 2025-03-03 RX ORDER — CHLORTHALIDONE 25 MG/1
12.5 TABLET ORAL DAILY
Qty: 45 TABLET | Refills: 2 | Status: SHIPPED | OUTPATIENT
Start: 2025-03-03

## 2025-03-03 RX ORDER — LEVOTHYROXINE SODIUM 50 UG/1
50 TABLET ORAL DAILY
Qty: 90 TABLET | Refills: 0 | Status: SHIPPED | OUTPATIENT
Start: 2025-03-03

## 2025-03-08 DIAGNOSIS — J32.9 PURULENT POSTNASAL DRAINAGE: ICD-10-CM

## 2025-03-08 DIAGNOSIS — J45.41 MODERATE PERSISTENT ASTHMA WITH ACUTE EXACERBATION: ICD-10-CM

## 2025-03-10 RX ORDER — MONTELUKAST SODIUM 10 MG/1
TABLET ORAL
Qty: 90 TABLET | Refills: 1 | Status: SHIPPED | OUTPATIENT
Start: 2025-03-10

## 2025-03-14 DIAGNOSIS — J32.9 PURULENT POSTNASAL DRAINAGE: ICD-10-CM

## 2025-03-15 RX ORDER — CETIRIZINE HYDROCHLORIDE 10 MG/1
TABLET ORAL
Qty: 180 TABLET | Refills: 1 | Status: SHIPPED | OUTPATIENT
Start: 2025-03-15

## 2025-05-27 DIAGNOSIS — I10 BENIGN ESSENTIAL HYPERTENSION: ICD-10-CM

## 2025-05-27 RX ORDER — LOSARTAN POTASSIUM 100 MG/1
100 TABLET ORAL DAILY
Qty: 90 TABLET | Refills: 3 | Status: SHIPPED | OUTPATIENT
Start: 2025-05-27

## 2025-07-02 ENCOUNTER — ANCILLARY PROCEDURE (OUTPATIENT)
Dept: GENERAL RADIOLOGY | Facility: CLINIC | Age: 70
End: 2025-07-02
Attending: FAMILY MEDICINE
Payer: COMMERCIAL

## 2025-07-02 ENCOUNTER — OFFICE VISIT (OUTPATIENT)
Dept: FAMILY MEDICINE | Facility: CLINIC | Age: 70
End: 2025-07-02
Payer: COMMERCIAL

## 2025-07-02 ENCOUNTER — RESULTS FOLLOW-UP (OUTPATIENT)
Dept: FAMILY MEDICINE | Facility: CLINIC | Age: 70
End: 2025-07-02

## 2025-07-02 VITALS
WEIGHT: 141 LBS | OXYGEN SATURATION: 98 % | TEMPERATURE: 97.3 F | HEART RATE: 57 BPM | RESPIRATION RATE: 16 BRPM | BODY MASS INDEX: 25.95 KG/M2 | DIASTOLIC BLOOD PRESSURE: 80 MMHG | SYSTOLIC BLOOD PRESSURE: 114 MMHG | HEIGHT: 62 IN

## 2025-07-02 DIAGNOSIS — M17.11 PRIMARY OSTEOARTHRITIS OF RIGHT KNEE: ICD-10-CM

## 2025-07-02 DIAGNOSIS — N30.01 ACUTE CYSTITIS WITH HEMATURIA: ICD-10-CM

## 2025-07-02 DIAGNOSIS — M71.9 DISORDER OF BURSAE AND TENDONS IN SHOULDER REGION: ICD-10-CM

## 2025-07-02 DIAGNOSIS — R05.1 ACUTE COUGH: ICD-10-CM

## 2025-07-02 DIAGNOSIS — M71.9 DISORDER OF BURSAE AND TENDONS IN SHOULDER REGION: Primary | ICD-10-CM

## 2025-07-02 DIAGNOSIS — M67.919 DISORDER OF BURSAE AND TENDONS IN SHOULDER REGION: Primary | ICD-10-CM

## 2025-07-02 DIAGNOSIS — C73 MALIGNANT NEOPLASM OF THYROID GLAND (H): ICD-10-CM

## 2025-07-02 DIAGNOSIS — J42 CHRONIC BRONCHITIS, UNSPECIFIED CHRONIC BRONCHITIS TYPE (H): ICD-10-CM

## 2025-07-02 DIAGNOSIS — R30.0 DYSURIA: ICD-10-CM

## 2025-07-02 DIAGNOSIS — M67.919 DISORDER OF BURSAE AND TENDONS IN SHOULDER REGION: ICD-10-CM

## 2025-07-02 LAB
ALBUMIN UR-MCNC: ABNORMAL MG/DL
APPEARANCE UR: ABNORMAL
BACTERIA #/AREA URNS HPF: ABNORMAL /HPF
BILIRUB UR QL STRIP: NEGATIVE
COLOR UR AUTO: YELLOW
GLUCOSE UR STRIP-MCNC: NEGATIVE MG/DL
HGB UR QL STRIP: ABNORMAL
KETONES UR STRIP-MCNC: NEGATIVE MG/DL
LEUKOCYTE ESTERASE UR QL STRIP: ABNORMAL
NITRATE UR QL: POSITIVE
PH UR STRIP: 8 [PH] (ref 5–7)
RBC #/AREA URNS AUTO: ABNORMAL /HPF
SP GR UR STRIP: 1.01 (ref 1–1.03)
SQUAMOUS #/AREA URNS AUTO: ABNORMAL /LPF
UROBILINOGEN UR STRIP-ACNC: 0.2 E.U./DL
WBC #/AREA URNS AUTO: ABNORMAL /HPF
WBC CLUMPS #/AREA URNS HPF: PRESENT /HPF
YEAST #/AREA URNS HPF: ABNORMAL /HPF

## 2025-07-02 PROCEDURE — 99214 OFFICE O/P EST MOD 30 MIN: CPT | Performed by: FAMILY MEDICINE

## 2025-07-02 PROCEDURE — 3079F DIAST BP 80-89 MM HG: CPT | Performed by: FAMILY MEDICINE

## 2025-07-02 PROCEDURE — G2211 COMPLEX E/M VISIT ADD ON: HCPCS | Performed by: FAMILY MEDICINE

## 2025-07-02 PROCEDURE — 1125F AMNT PAIN NOTED PAIN PRSNT: CPT | Performed by: FAMILY MEDICINE

## 2025-07-02 PROCEDURE — 81001 URINALYSIS AUTO W/SCOPE: CPT | Performed by: FAMILY MEDICINE

## 2025-07-02 PROCEDURE — 3074F SYST BP LT 130 MM HG: CPT | Performed by: FAMILY MEDICINE

## 2025-07-02 PROCEDURE — 73562 X-RAY EXAM OF KNEE 3: CPT | Mod: TC | Performed by: RADIOLOGY

## 2025-07-02 PROCEDURE — 87086 URINE CULTURE/COLONY COUNT: CPT | Performed by: FAMILY MEDICINE

## 2025-07-02 RX ORDER — NITROFURANTOIN 25; 75 MG/1; MG/1
100 CAPSULE ORAL 2 TIMES DAILY
Qty: 14 CAPSULE | Refills: 0 | Status: SHIPPED | OUTPATIENT
Start: 2025-07-02

## 2025-07-02 RX ORDER — CODEINE PHOSPHATE AND GUAIFENESIN 10; 100 MG/5ML; MG/5ML
1 SOLUTION ORAL EVERY 6 HOURS PRN
Qty: 250 ML | Refills: 2 | Status: SHIPPED | OUTPATIENT
Start: 2025-07-02

## 2025-07-02 ASSESSMENT — PAIN SCALES - GENERAL: PAINLEVEL_OUTOF10: SEVERE PAIN (7)

## 2025-07-02 NOTE — PROGRESS NOTES
"  Assessment & Plan     Chronic bronchitis, unspecified chronic bronchitis type (H)  Seeing pulmonology, worsening with cough, will have her to follow up with them as scheduled    Malignant neoplasm of thyroid gland (H)  Stable, keep monitoring closely     Disorder of bursae and tendons in shoulder region  Has been worsening with movement, has limited ROM due to pain,will have her to check on XR and sees ortho for further evaluation   - Orthopedic  Referral; Future  - XR Shoulder Right G/E 3 Views; Future    Dysuria  Has been worsening with frequent sx of cystitis, has no sign concerning pyelo fortunately, will have her to check on UA for further evaluation   - UA with Microscopic reflex to Culture - lab collect; Future    Acute cough  Mentioned above, has no sigh of pneumonia nor bronchitis   - guaiFENesin-codeine (ROBITUSSIN AC) 100-10 MG/5ML solution; Take 5 mLs by mouth every 6 hours as needed for cough.    Primary osteoarthritis of right knee  Has been worsening with stiffness and buckling over past 4-5 months, will check on xr and consider intraarticular injection if indicated   - XR Knee Right 3 Views; Future        BMI  Estimated body mass index is 25.62 kg/m  as calculated from the following:    Height as of this encounter: 1.58 m (5' 2.2\").    Weight as of this encounter: 64 kg (141 lb).         Syeda Friedman is a 69 year old, presenting for the following health issues:  Arm Pain (Right arm chronic but has been getting worse last 6 months )        7/2/2025    10:12 AM   Additional Questions   Roomed by Jose     History of Present Illness       Reason for visit:  Pain in arm, pain in knee, also possible urine infection, cough and mucus  Symptom onset:  More than a month  Symptoms include:  Random sharp pains in joint areas, pain when urinating, coughing with mucus  Symptom intensity:  Severe  Symptom progression:  Staying the same  Had these symptoms before:  No  What makes it worse:  " "Certain movements of my arm, and or going up and down stairs.  What makes it better:  Have not found anything yet She is missing 1 dose(s) of medications per week.  She is not taking prescribed medications regularly due to remembering to take and other.          Review of Systems  Constitutional, HEENT, cardiovascular, pulmonary, GI, , musculoskeletal, neuro, skin, endocrine and psych systems are negative, except as otherwise noted.      Objective    /80   Pulse 57   Temp 97.3  F (36.3  C) (Temporal)   Resp 16   Ht 1.58 m (5' 2.2\")   Wt 64 kg (141 lb)   LMP  (LMP Unknown)   SpO2 98%   BMI 25.62 kg/m    Body mass index is 25.62 kg/m .  Physical Exam   GENERAL: alert and no distress  EYES: Eyes grossly normal to inspection, PERRL and conjunctivae and sclerae normal  HENT: ear canals and TM's normal, nose and mouth without ulcers or lesions  NECK: no adenopathy, no asymmetry, masses, or scars  RESP: lungs clear to auscultation - no rales, rhonchi or wheezes  CV: regular rate and rhythm, normal S1 S2, no S3 or S4, no murmur, click or rub, no peripheral edema  ABDOMEN: soft, nontender, no hepatosplenomegaly, no masses and bowel sounds normal  MS: no gross musculoskeletal defects noted, no edema  SKIN: no suspicious lesions or rashes  NEURO: Normal strength and tone, mentation intact and speech normal  BACK: no CVA tenderness, no paralumbar tenderness  PSYCH: mentation appears normal, affect normal/bright  LYMPH: no cervical, supraclavicular, axillary, or inguinal adenopathy            Signed Electronically by: Akhil Almodovar MD    "

## 2025-07-03 ENCOUNTER — PATIENT OUTREACH (OUTPATIENT)
Dept: CARE COORDINATION | Facility: CLINIC | Age: 70
End: 2025-07-03
Payer: COMMERCIAL

## 2025-07-03 LAB — BACTERIA UR CULT: NORMAL

## 2025-07-07 ENCOUNTER — PATIENT OUTREACH (OUTPATIENT)
Dept: CARE COORDINATION | Facility: CLINIC | Age: 70
End: 2025-07-07
Payer: COMMERCIAL

## 2025-08-02 SDOH — HEALTH STABILITY: PHYSICAL HEALTH: ON AVERAGE, HOW MANY MINUTES DO YOU ENGAGE IN EXERCISE AT THIS LEVEL?: 30 MIN

## 2025-08-02 SDOH — HEALTH STABILITY: PHYSICAL HEALTH: ON AVERAGE, HOW MANY DAYS PER WEEK DO YOU ENGAGE IN MODERATE TO STRENUOUS EXERCISE (LIKE A BRISK WALK)?: 2 DAYS

## 2025-08-05 ENCOUNTER — OFFICE VISIT (OUTPATIENT)
Dept: ORTHOPEDICS | Facility: CLINIC | Age: 70
End: 2025-08-05
Payer: MEDICARE

## 2025-08-05 ENCOUNTER — PRE VISIT (OUTPATIENT)
Dept: ORTHOPEDICS | Facility: CLINIC | Age: 70
End: 2025-08-05

## 2025-08-05 DIAGNOSIS — M17.11 PRIMARY OSTEOARTHRITIS OF RIGHT KNEE: Primary | ICD-10-CM

## 2025-08-05 DIAGNOSIS — M25.811 IMPINGEMENT OF RIGHT SHOULDER: ICD-10-CM

## 2025-08-05 PROCEDURE — 99204 OFFICE O/P NEW MOD 45 MIN: CPT | Performed by: FAMILY MEDICINE

## 2025-08-05 PROCEDURE — 1125F AMNT PAIN NOTED PAIN PRSNT: CPT | Performed by: FAMILY MEDICINE

## 2025-08-05 RX ORDER — CELECOXIB 200 MG/1
200 CAPSULE ORAL DAILY PRN
Qty: 60 CAPSULE | Refills: 1 | Status: SHIPPED | OUTPATIENT
Start: 2025-08-05

## 2025-08-05 ASSESSMENT — PAIN SCALES - GENERAL: PAINLEVEL_OUTOF10: MODERATE PAIN (5)

## 2025-08-11 ENCOUNTER — PRE VISIT (OUTPATIENT)
Dept: ORTHOPEDICS | Facility: CLINIC | Age: 70
End: 2025-08-11

## 2025-08-25 DIAGNOSIS — J32.9 PURULENT POSTNASAL DRAINAGE: ICD-10-CM

## 2025-08-25 DIAGNOSIS — J45.41 MODERATE PERSISTENT ASTHMA WITH ACUTE EXACERBATION: ICD-10-CM

## 2025-08-25 RX ORDER — MONTELUKAST SODIUM 10 MG/1
1 TABLET ORAL AT BEDTIME
Qty: 90 TABLET | Refills: 1 | Status: SHIPPED | OUTPATIENT
Start: 2025-08-25

## 2025-08-26 DIAGNOSIS — E78.5 HYPERLIPIDEMIA LDL GOAL <160: ICD-10-CM

## 2025-08-26 RX ORDER — SIMVASTATIN 40 MG
40 TABLET ORAL AT BEDTIME
Qty: 90 TABLET | Refills: 0 | Status: SHIPPED | OUTPATIENT
Start: 2025-08-26

## (undated) RX ORDER — FENTANYL CITRATE 50 UG/ML
INJECTION, SOLUTION INTRAMUSCULAR; INTRAVENOUS
Status: DISPENSED
Start: 2023-07-05

## (undated) RX ORDER — FENTANYL CITRATE 50 UG/ML
INJECTION, SOLUTION INTRAMUSCULAR; INTRAVENOUS
Status: DISPENSED
Start: 2024-01-11